# Patient Record
Sex: FEMALE | Race: WHITE | Employment: OTHER | URBAN - METROPOLITAN AREA
[De-identification: names, ages, dates, MRNs, and addresses within clinical notes are randomized per-mention and may not be internally consistent; named-entity substitution may affect disease eponyms.]

---

## 2017-01-11 ENCOUNTER — TELEPHONE (OUTPATIENT)
Dept: INTERNAL MEDICINE CLINIC | Facility: CLINIC | Age: 77
End: 2017-01-11

## 2017-01-11 RX ORDER — ATORVASTATIN CALCIUM 20 MG/1
20 TABLET, FILM COATED ORAL NIGHTLY
Qty: 90 TABLET | Refills: 3 | Status: SHIPPED | OUTPATIENT
Start: 2017-01-11 | End: 2017-07-11

## 2017-01-13 ENCOUNTER — OFFICE VISIT (OUTPATIENT)
Dept: INTERNAL MEDICINE CLINIC | Facility: CLINIC | Age: 77
End: 2017-01-13

## 2017-01-13 VITALS
DIASTOLIC BLOOD PRESSURE: 90 MMHG | TEMPERATURE: 97 F | BODY MASS INDEX: 29 KG/M2 | SYSTOLIC BLOOD PRESSURE: 160 MMHG | WEIGHT: 152 LBS | RESPIRATION RATE: 18 BRPM | HEART RATE: 61 BPM | OXYGEN SATURATION: 96 %

## 2017-01-13 DIAGNOSIS — J06.9 URTI (ACUTE UPPER RESPIRATORY INFECTION): Primary | ICD-10-CM

## 2017-01-13 DIAGNOSIS — I48.0 PAF (PAROXYSMAL ATRIAL FIBRILLATION) (HCC): ICD-10-CM

## 2017-01-13 DIAGNOSIS — I10 ESSENTIAL HYPERTENSION: ICD-10-CM

## 2017-01-13 PROCEDURE — 99214 OFFICE O/P EST MOD 30 MIN: CPT | Performed by: INTERNAL MEDICINE

## 2017-01-13 RX ORDER — AMIODARONE HYDROCHLORIDE 200 MG/1
200 TABLET ORAL DAILY
Qty: 30 TABLET | Refills: 3 | Status: ON HOLD | OUTPATIENT
Start: 2017-01-13 | End: 2017-02-10

## 2017-01-13 NOTE — PROGRESS NOTES
Patient states she is not feeling well. Does have a mild headache feels achy been going on for 2 weeks. She does have a nonproductive cough feels very fatigued. Denies sore throat earache. No fever chills.   Problem #2 hypertension patient denies shortn

## 2017-01-23 ENCOUNTER — OFFICE VISIT (OUTPATIENT)
Dept: INTERNAL MEDICINE CLINIC | Facility: CLINIC | Age: 77
End: 2017-01-23

## 2017-01-23 VITALS
BODY MASS INDEX: 29 KG/M2 | HEART RATE: 73 BPM | WEIGHT: 149 LBS | OXYGEN SATURATION: 99 % | DIASTOLIC BLOOD PRESSURE: 70 MMHG | RESPIRATION RATE: 16 BRPM | SYSTOLIC BLOOD PRESSURE: 156 MMHG

## 2017-01-23 DIAGNOSIS — I10 ELEVATED BLOOD PRESSURE READING WITH DIAGNOSIS OF HYPERTENSION: ICD-10-CM

## 2017-01-23 DIAGNOSIS — R53.83 OTHER FATIGUE: Primary | ICD-10-CM

## 2017-01-23 LAB
ALBUMIN SERPL-MCNC: 4.1 G/DL (ref 3.5–4.8)
ALP LIVER SERPL-CCNC: 87 U/L (ref 55–142)
ALT SERPL-CCNC: 20 U/L (ref 14–54)
AST SERPL-CCNC: 15 U/L (ref 15–41)
BASOPHILS # BLD AUTO: 0.06 X10(3) UL (ref 0–0.1)
BASOPHILS NFR BLD AUTO: 0.8 %
BILIRUB SERPL-MCNC: 0.7 MG/DL (ref 0.1–2)
BUN BLD-MCNC: 25 MG/DL (ref 8–20)
CALCIUM BLD-MCNC: 9 MG/DL (ref 8.3–10.3)
CHLORIDE: 104 MMOL/L (ref 101–111)
CO2: 29 MMOL/L (ref 22–32)
CREAT BLD-MCNC: 1.66 MG/DL (ref 0.55–1.02)
EOSINOPHIL # BLD AUTO: 0.33 X10(3) UL (ref 0–0.3)
EOSINOPHIL NFR BLD AUTO: 4.4 %
ERYTHROCYTE [DISTWIDTH] IN BLOOD BY AUTOMATED COUNT: 17.9 % (ref 11.5–16)
GLUCOSE BLD-MCNC: 91 MG/DL (ref 70–99)
HCT VFR BLD AUTO: 32.5 % (ref 34–50)
HGB BLD-MCNC: 10.1 G/DL (ref 12–16)
IMMATURE GRANULOCYTE COUNT: 0.04 X10(3) UL (ref 0–1)
IMMATURE GRANULOCYTE RATIO %: 0.5 %
LYMPHOCYTES # BLD AUTO: 1.58 X10(3) UL (ref 0.9–4)
LYMPHOCYTES NFR BLD AUTO: 20.9 %
M PROTEIN MFR SERPL ELPH: 7.7 G/DL (ref 6.1–8.3)
MCH RBC QN AUTO: 27.4 PG (ref 27–33.2)
MCHC RBC AUTO-ENTMCNC: 31.1 G/DL (ref 31–37)
MCV RBC AUTO: 88.1 FL (ref 81–100)
MONOCYTES # BLD AUTO: 0.59 X10(3) UL (ref 0.1–0.6)
MONOCYTES NFR BLD AUTO: 7.8 %
NEUTROPHIL ABS PRELIM: 4.97 X10 (3) UL (ref 1.3–6.7)
NEUTROPHILS # BLD AUTO: 4.97 X10(3) UL (ref 1.3–6.7)
NEUTROPHILS NFR BLD AUTO: 65.6 %
PLATELET # BLD AUTO: 270 10(3)UL (ref 150–450)
POTASSIUM SERPL-SCNC: 4.2 MMOL/L (ref 3.6–5.1)
RBC # BLD AUTO: 3.69 X10(6)UL (ref 3.8–5.1)
RED CELL DISTRIBUTION WIDTH-SD: 58 FL (ref 35.1–46.3)
SODIUM SERPL-SCNC: 137 MMOL/L (ref 136–144)
TSI SER-ACNC: 6.13 MIU/ML (ref 0.35–5.5)
WBC # BLD AUTO: 7.6 X10(3) UL (ref 4–13)

## 2017-01-23 PROCEDURE — 80053 COMPREHEN METABOLIC PANEL: CPT | Performed by: INTERNAL MEDICINE

## 2017-01-23 PROCEDURE — 36415 COLL VENOUS BLD VENIPUNCTURE: CPT | Performed by: INTERNAL MEDICINE

## 2017-01-23 PROCEDURE — 99214 OFFICE O/P EST MOD 30 MIN: CPT | Performed by: INTERNAL MEDICINE

## 2017-01-23 PROCEDURE — 84443 ASSAY THYROID STIM HORMONE: CPT | Performed by: INTERNAL MEDICINE

## 2017-01-23 PROCEDURE — 85025 COMPLETE CBC W/AUTO DIFF WBC: CPT | Performed by: INTERNAL MEDICINE

## 2017-01-23 NOTE — PROGRESS NOTES
Patient states she feels fatigued. No unexplained weight loss weight gain cold or heat intolerance. No change in sleeping habits. Blood pressure once again is elevated. When reviewing past blood pressures she has been well controlled.   She denies any c

## 2017-02-02 ENCOUNTER — APPOINTMENT (OUTPATIENT)
Dept: GENERAL RADIOLOGY | Facility: HOSPITAL | Age: 77
DRG: 234 | End: 2017-02-02
Attending: STUDENT IN AN ORGANIZED HEALTH CARE EDUCATION/TRAINING PROGRAM
Payer: MEDICARE

## 2017-02-02 ENCOUNTER — APPOINTMENT (OUTPATIENT)
Dept: CV DIAGNOSTICS | Facility: HOSPITAL | Age: 77
DRG: 234 | End: 2017-02-02
Attending: INTERNAL MEDICINE
Payer: MEDICARE

## 2017-02-02 ENCOUNTER — HOSPITAL ENCOUNTER (INPATIENT)
Facility: HOSPITAL | Age: 77
LOS: 7 days | Discharge: NURSING, REHAB OR LONG-TERM CARE | DRG: 234 | End: 2017-02-10
Attending: STUDENT IN AN ORGANIZED HEALTH CARE EDUCATION/TRAINING PROGRAM | Admitting: INTERNAL MEDICINE
Payer: MEDICARE

## 2017-02-02 DIAGNOSIS — R07.9 ACUTE CHEST PAIN: Primary | ICD-10-CM

## 2017-02-02 DIAGNOSIS — I48.0 PAF (PAROXYSMAL ATRIAL FIBRILLATION) (HCC): ICD-10-CM

## 2017-02-02 DIAGNOSIS — R06.00 DYSPNEA, UNSPECIFIED TYPE: ICD-10-CM

## 2017-02-02 DIAGNOSIS — M85.80 OSTEOPENIA: ICD-10-CM

## 2017-02-02 PROBLEM — D64.9 ANEMIA: Status: ACTIVE | Noted: 2017-02-02

## 2017-02-02 PROBLEM — R73.9 HYPERGLYCEMIA: Status: ACTIVE | Noted: 2017-02-02

## 2017-02-02 LAB
ADENOVIRUS PCR:: NEGATIVE
ALBUMIN SERPL-MCNC: 3.7 G/DL (ref 3.5–4.8)
ALP LIVER SERPL-CCNC: 92 U/L (ref 55–142)
ALT SERPL-CCNC: 19 U/L (ref 14–54)
APTT PPP: 137.7 SECONDS (ref 25–34)
APTT PPP: 40.6 SECONDS (ref 25–34)
APTT PPP: 50.5 SECONDS (ref 25–34)
AST SERPL-CCNC: 16 U/L (ref 15–41)
ATRIAL RATE: 61 BPM
B PERT DNA SPEC QL NAA+PROBE: NEGATIVE
BASOPHILS # BLD AUTO: 0.04 X10(3) UL (ref 0–0.1)
BASOPHILS # BLD AUTO: 0.05 X10(3) UL (ref 0–0.1)
BASOPHILS NFR BLD AUTO: 0.5 %
BASOPHILS NFR BLD AUTO: 0.7 %
BILIRUB SERPL-MCNC: 0.6 MG/DL (ref 0.1–2)
BUN BLD-MCNC: 23 MG/DL (ref 8–20)
BUN BLD-MCNC: 26 MG/DL (ref 8–20)
BUN BLD-MCNC: 27 MG/DL (ref 8–20)
C PNEUM DNA SPEC QL NAA+PROBE: NEGATIVE
CALCIUM BLD-MCNC: 8.8 MG/DL (ref 8.3–10.3)
CALCIUM BLD-MCNC: 8.9 MG/DL (ref 8.3–10.3)
CALCIUM BLD-MCNC: 9.4 MG/DL (ref 8.3–10.3)
CHLORIDE: 104 MMOL/L (ref 101–111)
CHLORIDE: 104 MMOL/L (ref 101–111)
CHLORIDE: 105 MMOL/L (ref 101–111)
CHOLEST SMN-MCNC: 128 MG/DL (ref ?–200)
CO2: 26 MMOL/L (ref 22–32)
CO2: 27 MMOL/L (ref 22–32)
CO2: 27 MMOL/L (ref 22–32)
CORONAVIRUS 229E PCR:: NEGATIVE
CORONAVIRUS HKU1 PCR:: NEGATIVE
CORONAVIRUS NL63 PCR:: NEGATIVE
CORONAVIRUS OC43 PCR:: NEGATIVE
CREAT BLD-MCNC: 1.69 MG/DL (ref 0.55–1.02)
CREAT BLD-MCNC: 1.83 MG/DL (ref 0.55–1.02)
CREAT BLD-MCNC: 1.85 MG/DL (ref 0.55–1.02)
EOSINOPHIL # BLD AUTO: 0.33 X10(3) UL (ref 0–0.3)
EOSINOPHIL # BLD AUTO: 0.35 X10(3) UL (ref 0–0.3)
EOSINOPHIL NFR BLD AUTO: 4.5 %
EOSINOPHIL NFR BLD AUTO: 4.7 %
ERYTHROCYTE [DISTWIDTH] IN BLOOD BY AUTOMATED COUNT: 17.8 % (ref 11.5–16)
ERYTHROCYTE [DISTWIDTH] IN BLOOD BY AUTOMATED COUNT: 17.9 % (ref 11.5–16)
ERYTHROCYTE [DISTWIDTH] IN BLOOD BY AUTOMATED COUNT: 18 % (ref 11.5–16)
FLUAV H1 2009 PAND RNA SPEC QL NAA+PROBE: NEGATIVE
FLUAV H1 RNA SPEC QL NAA+PROBE: NEGATIVE
FLUAV H3 RNA SPEC QL NAA+PROBE: NEGATIVE
FLUAV RNA SPEC QL NAA+PROBE: NEGATIVE
FLUBV RNA SPEC QL NAA+PROBE: NEGATIVE
GLUCOSE BLD-MCNC: 101 MG/DL (ref 70–99)
GLUCOSE BLD-MCNC: 102 MG/DL (ref 70–99)
GLUCOSE BLD-MCNC: 125 MG/DL (ref 70–99)
HAV IGM SER QL: 2.1 MG/DL (ref 1.7–3)
HCT VFR BLD AUTO: 30.2 % (ref 34–50)
HCT VFR BLD AUTO: 31.1 % (ref 34–50)
HCT VFR BLD AUTO: 31.2 % (ref 34–50)
HDLC SERPL-MCNC: 55 MG/DL (ref 45–?)
HDLC SERPL: 2.33 {RATIO} (ref ?–4.44)
HGB BLD-MCNC: 10.2 G/DL (ref 12–16)
HGB BLD-MCNC: 9.6 G/DL (ref 12–16)
HGB BLD-MCNC: 9.9 G/DL (ref 12–16)
IMMATURE GRANULOCYTE COUNT: 0.02 X10(3) UL (ref 0–1)
IMMATURE GRANULOCYTE COUNT: 0.02 X10(3) UL (ref 0–1)
IMMATURE GRANULOCYTE RATIO %: 0.3 %
IMMATURE GRANULOCYTE RATIO %: 0.3 %
INR BLD: 1.68 (ref 0.89–1.12)
LACTIC ACID: 0.5 MMOL/L (ref 0.5–2)
LACTIC ACID: 0.9 MMOL/L (ref 0.5–2)
LACTIC ACID: 1.2 MMOL/L (ref 0.5–2)
LDLC SERPL CALC-MCNC: 53 MG/DL (ref ?–130)
LYMPHOCYTES # BLD AUTO: 1.61 X10(3) UL (ref 0.9–4)
LYMPHOCYTES # BLD AUTO: 1.61 X10(3) UL (ref 0.9–4)
LYMPHOCYTES NFR BLD AUTO: 21.4 %
LYMPHOCYTES NFR BLD AUTO: 21.7 %
M PROTEIN MFR SERPL ELPH: 7.5 G/DL (ref 6.1–8.3)
MCH RBC QN AUTO: 27.5 PG (ref 27–33.2)
MCH RBC QN AUTO: 27.6 PG (ref 27–33.2)
MCH RBC QN AUTO: 28.1 PG (ref 27–33.2)
MCHC RBC AUTO-ENTMCNC: 31.8 G/DL (ref 31–37)
MCHC RBC AUTO-ENTMCNC: 31.8 G/DL (ref 31–37)
MCHC RBC AUTO-ENTMCNC: 32.7 G/DL (ref 31–37)
MCV RBC AUTO: 86 FL (ref 81–100)
MCV RBC AUTO: 86.5 FL (ref 81–100)
MCV RBC AUTO: 86.6 FL (ref 81–100)
METAPNEUMOVIRUS PCR:: NEGATIVE
MONOCYTES # BLD AUTO: 0.63 X10(3) UL (ref 0.1–0.6)
MONOCYTES # BLD AUTO: 0.69 X10(3) UL (ref 0.1–0.6)
MONOCYTES NFR BLD AUTO: 8.5 %
MONOCYTES NFR BLD AUTO: 9.2 %
MYCOPLASMA PNEUMONIA PCR:: NEGATIVE
NEUTROPHIL ABS PRELIM: 4.77 X10 (3) UL (ref 1.3–6.7)
NEUTROPHIL ABS PRELIM: 4.81 X10 (3) UL (ref 1.3–6.7)
NEUTROPHILS # BLD AUTO: 4.77 X10(3) UL (ref 1.3–6.7)
NEUTROPHILS # BLD AUTO: 4.81 X10(3) UL (ref 1.3–6.7)
NEUTROPHILS NFR BLD AUTO: 63.9 %
NEUTROPHILS NFR BLD AUTO: 64.3 %
NONHDLC SERPL-MCNC: 73 MG/DL (ref ?–130)
P AXIS: 60 DEGREES
P-R INTERVAL: 220 MS
PARAINFLUENZA 1 PCR:: NEGATIVE
PARAINFLUENZA 2 PCR:: NEGATIVE
PARAINFLUENZA 3 PCR:: NEGATIVE
PARAINFLUENZA 4 PCR:: NEGATIVE
PLATELET # BLD AUTO: 222 10(3)UL (ref 150–450)
PLATELET # BLD AUTO: 236 10(3)UL (ref 150–450)
PLATELET # BLD AUTO: 239 10(3)UL (ref 150–450)
POTASSIUM SERPL-SCNC: 3.9 MMOL/L (ref 3.6–5.1)
POTASSIUM SERPL-SCNC: 4.2 MMOL/L (ref 3.6–5.1)
POTASSIUM SERPL-SCNC: 4.6 MMOL/L (ref 3.6–5.1)
PRO-BETA NATRIURETIC PEPTIDE: 540 PG/ML (ref ?–450)
PROCALCITONIN SERPL-MCNC: <0.11 NG/ML (ref ?–0.11)
PSA SERPL DL<=0.01 NG/ML-MCNC: 20.4 SECONDS (ref 12.3–14.8)
Q-T INTERVAL: 418 MS
QRS DURATION: 74 MS
QTC CALCULATION (BEZET): 420 MS
R AXIS: 50 DEGREES
RBC # BLD AUTO: 3.49 X10(6)UL (ref 3.8–5.1)
RBC # BLD AUTO: 3.59 X10(6)UL (ref 3.8–5.1)
RBC # BLD AUTO: 3.63 X10(6)UL (ref 3.8–5.1)
RED CELL DISTRIBUTION WIDTH-SD: 56.4 FL (ref 35.1–46.3)
RED CELL DISTRIBUTION WIDTH-SD: 56.8 FL (ref 35.1–46.3)
RED CELL DISTRIBUTION WIDTH-SD: 57 FL (ref 35.1–46.3)
RHINOVIRUS/ENTERO PCR:: POSITIVE
RSV RNA SPEC QL NAA+PROBE: NEGATIVE
SODIUM SERPL-SCNC: 138 MMOL/L (ref 136–144)
SODIUM SERPL-SCNC: 138 MMOL/L (ref 136–144)
SODIUM SERPL-SCNC: 139 MMOL/L (ref 136–144)
T AXIS: 78 DEGREES
TRIGLYCERIDES: 100 MG/DL (ref ?–150)
TROPONIN: 0.06 NG/ML (ref ?–0.05)
TROPONIN: <0.046 NG/ML (ref ?–0.05)
TROPONIN: <0.046 NG/ML (ref ?–0.05)
VENTRICULAR RATE: 61 BPM
VLDL: 20 MG/DL (ref 5–40)
WBC # BLD AUTO: 6.4 X10(3) UL (ref 4–13)
WBC # BLD AUTO: 7.4 X10(3) UL (ref 4–13)
WBC # BLD AUTO: 7.5 X10(3) UL (ref 4–13)

## 2017-02-02 PROCEDURE — 71010 XR CHEST AP PORTABLE  (CPT=71010): CPT

## 2017-02-02 PROCEDURE — 99220 INITIAL OBSERVATION CARE,LEVL III: CPT | Performed by: INTERNAL MEDICINE

## 2017-02-02 PROCEDURE — 93306 TTE W/DOPPLER COMPLETE: CPT

## 2017-02-02 PROCEDURE — 93306 TTE W/DOPPLER COMPLETE: CPT | Performed by: INTERNAL MEDICINE

## 2017-02-02 RX ORDER — ENOXAPARIN SODIUM 100 MG/ML
40 INJECTION SUBCUTANEOUS DAILY
Status: DISCONTINUED | OUTPATIENT
Start: 2017-02-02 | End: 2017-02-02

## 2017-02-02 RX ORDER — MORPHINE SULFATE 2 MG/ML
2 INJECTION, SOLUTION INTRAMUSCULAR; INTRAVENOUS EVERY 2 HOUR PRN
Status: DISCONTINUED | OUTPATIENT
Start: 2017-02-02 | End: 2017-02-06

## 2017-02-02 RX ORDER — LEVOTHYROXINE SODIUM 0.05 MG/1
50 TABLET ORAL
Status: DISCONTINUED | OUTPATIENT
Start: 2017-02-02 | End: 2017-02-07

## 2017-02-02 RX ORDER — HEPARIN SODIUM AND DEXTROSE 10000; 5 [USP'U]/100ML; G/100ML
INJECTION INTRAVENOUS CONTINUOUS
Status: DISCONTINUED | OUTPATIENT
Start: 2017-02-02 | End: 2017-02-06

## 2017-02-02 RX ORDER — MAGNESIUM OXIDE 400 MG (241.3 MG MAGNESIUM) TABLET
100 TABLET DAILY
Status: DISCONTINUED | OUTPATIENT
Start: 2017-02-02 | End: 2017-02-06

## 2017-02-02 RX ORDER — NITROGLYCERIN 0.4 MG/1
0.4 TABLET SUBLINGUAL EVERY 5 MIN PRN
Status: DISCONTINUED | OUTPATIENT
Start: 2017-02-02 | End: 2017-02-06

## 2017-02-02 RX ORDER — MORPHINE SULFATE 4 MG/ML
4 INJECTION, SOLUTION INTRAMUSCULAR; INTRAVENOUS EVERY 2 HOUR PRN
Status: DISCONTINUED | OUTPATIENT
Start: 2017-02-02 | End: 2017-02-06

## 2017-02-02 RX ORDER — ASPIRIN 325 MG
325 TABLET ORAL DAILY
Status: DISCONTINUED | OUTPATIENT
Start: 2017-02-02 | End: 2017-02-10

## 2017-02-02 RX ORDER — MORPHINE SULFATE 2 MG/ML
1 INJECTION, SOLUTION INTRAMUSCULAR; INTRAVENOUS EVERY 2 HOUR PRN
Status: DISCONTINUED | OUTPATIENT
Start: 2017-02-02 | End: 2017-02-06

## 2017-02-02 RX ORDER — LORAZEPAM 1 MG/1
1 TABLET ORAL NIGHTLY PRN
Status: DISCONTINUED | OUTPATIENT
Start: 2017-02-02 | End: 2017-02-06

## 2017-02-02 RX ORDER — HEPARIN SODIUM 5000 [USP'U]/ML
60 INJECTION INTRAVENOUS; SUBCUTANEOUS ONCE
Status: DISCONTINUED | OUTPATIENT
Start: 2017-02-02 | End: 2017-02-05 | Stop reason: ALTCHOICE

## 2017-02-02 RX ORDER — SODIUM CHLORIDE 9 MG/ML
INJECTION, SOLUTION INTRAVENOUS CONTINUOUS
Status: DISCONTINUED | OUTPATIENT
Start: 2017-02-02 | End: 2017-02-02

## 2017-02-02 RX ORDER — MELATONIN
325
Status: DISCONTINUED | OUTPATIENT
Start: 2017-02-02 | End: 2017-02-10

## 2017-02-02 RX ORDER — ATORVASTATIN CALCIUM 20 MG/1
20 TABLET, FILM COATED ORAL NIGHTLY
Status: DISCONTINUED | OUTPATIENT
Start: 2017-02-02 | End: 2017-02-10

## 2017-02-02 RX ORDER — PANTOPRAZOLE SODIUM 20 MG/1
20 TABLET, DELAYED RELEASE ORAL
Status: DISCONTINUED | OUTPATIENT
Start: 2017-02-02 | End: 2017-02-06

## 2017-02-02 RX ORDER — LOSARTAN POTASSIUM 100 MG/1
100 TABLET ORAL DAILY
Status: DISCONTINUED | OUTPATIENT
Start: 2017-02-02 | End: 2017-02-06

## 2017-02-02 RX ORDER — METOPROLOL SUCCINATE 25 MG/1
25 TABLET, EXTENDED RELEASE ORAL
Status: DISCONTINUED | OUTPATIENT
Start: 2017-02-02 | End: 2017-02-10

## 2017-02-02 RX ORDER — AMIODARONE HYDROCHLORIDE 200 MG/1
200 TABLET ORAL
Status: DISCONTINUED | OUTPATIENT
Start: 2017-02-02 | End: 2017-02-08

## 2017-02-02 NOTE — ED PROVIDER NOTES
Patient Seen in: [facility] Emergency Department    History   Patient presents with:  Chest Pain Angina (cardiovascular)    Stated Complaint: cp-resolved    HPI    Patient is a 49-year-old lady with previous history of coronary artery disease who pr Fibroadenoma    CATH BARE METAL STENT (BMS)      CATARACT       N/A 6/6/2016    Comment Procedure: ABDOMINAL AORTIC ANEURYSM ENDOSCOPIC;  Surgeon: Emilee Gifford MD;  Location: [facility] CVOR    EGD N/A 8/19/2016    Comment Procedure: ESOPHAGOGASTRODUODENOSCOPY Brother    • lung disease [Other] [OTHER] Sister    • Suicide History Daughter 43   • Suicide History Son 43   • lung cancer [Other] [OTHER] Daughter 50         Smoking Status: Former Smoker                   Packs/Day: 1.00  Years: 30        Types: Gorge Hodgkin Reviewed   COMP METABOLIC PANEL (14) - Abnormal; Notable for the following:     Glucose 102 (*)     BUN 27 (*)     Creatinine 1.85 (*)     GFR 26 (*)     All other components within normal limits   PROTHROMBIN TIME (PT) - Abnormal; Notable for the followin elevations, no significant change from prior EKG with the exception of T-wave flattening noted in V2 and V3 as compared to previous EKG performed on August 25, 2016.             MDM     Extensive differential diagnosis was considered including underlying ca

## 2017-02-02 NOTE — PLAN OF CARE
Discussed case with Dr. Rhoda Moreno. Plan is for cath if negative for influenza. Swab positive for Rhino virus and Enterovirus. Continue IV heparin for now. Will determine timing for LHC with Dr. Rhoda Moreno.    CYNDI Davis  2/2/2017  12:33 PM

## 2017-02-02 NOTE — PLAN OF CARE
Patient/Family Goals    • Patient/Family Long Term Goal Progressing    • Patient/Family Short Term Goal Progressing        Patient was admitted from the ER with CP and reports to have had a cold the last few days.  Troponin neg, chest xray neg, lactic acid

## 2017-02-02 NOTE — PLAN OF CARE
CARDIOVASCULAR - ADULT    • Maintains optimal cardiac output and hemodynamic stability Progressing    • Absence of cardiac arrhythmias or at baseline Progressing          Assumed care of pt around 0730. Denies any chest pain or SOB on RA.   Sinus brandon HR 5

## 2017-02-02 NOTE — ED INITIAL ASSESSMENT (HPI)
Pt reports chest pain earlier tonight, now resolved, shortness of breath tonight, now resolved. States she is doctoring a recent cold for past few days.  Denies pain on arrival.

## 2017-02-02 NOTE — CONSULTS
[facility]    Cardiology Consultation    Evangelista Sunshine Location: [address]    1940 MRN [id number]   Consulting Date 2017 [facility] [id number]   Consulting Physician Gorge Green MD Attending Physician Dalia Alamo MD ESOPHAGOGASTRODUODENOSCOPY (EGD);   Surgeon: Srini Carson MD;  Location: [facility] ENDOSCOPY     Family History   Problem Relation Age of Onset   • cancer larynx [Other] [OTHER] Father    • Heart Disease Mother    • Skin cancer Brother    • lung disease [ 37.5-25 MG Oral Cap Take 1 capsule by mouth every morning. Disp: 90 capsule Rfl: 3   Multiple Vitamins-Minerals (TAB-A-MADELINE MAXIMUM) Oral Tab Take 1 tablet by mouth daily. Disp:  Rfl:    aspirin 81 MG Oral Tab Take 1 tablet (81 mg total) by mouth daily.  Arnav Hernandez 02/02/2017    02/02/2017   CA 8.9 02/02/2017   ALB 3.7 02/02/2017   ALKPHO 92 02/02/2017   BILT 0.6 02/02/2017   TP 7.5 02/02/2017   AST 16 02/02/2017   ALT 19 02/02/2017   PTT 50.5 02/02/2017   INR 1.68 02/02/2017   MG 2.1 02/02/2017   TROP 0.063 0

## 2017-02-02 NOTE — ED NOTES
Report phoned to [facility] RN Pt to transfer to [address] [facility].

## 2017-02-02 NOTE — H&P
[facility]                                                               History & Physical         Gopal Charles Patient Status:  Observation    1940 MRN [id number]   [facility] 8NE-A Attending Poonam Pak MD   [facility] Day ESOPHAGOGASTRODUODENOSCOPY (EGD); Surgeon: Layne Heimlich, MD;  Location: [address] [place] [address]    EGD N/A 8/26/2016    Comment Procedure: ESOPHAGOGASTRODUODENOSCOPY (EGD);   Surgeon: Jhony Poole MD;  Location: [address] [place] [address]       Family history:  Family Rfl: 3 Taking   LORazepam 1 MG Oral Tab Take 1 tablet (1 mg total) by mouth nightly as needed for Anxiety. Disp: 90 tablet Rfl: 3 Taking   Triamterene-HCTZ 37.5-25 MG Oral Cap Take 1 capsule by mouth every morning.  Disp: 90 capsule Rfl: 3 Taking   Multiple 3.7 02/02/2017   ALKPHO 92 02/02/2017   BILT 0.6 02/02/2017   TP 7.5 02/02/2017   AST 16 02/02/2017   ALT 19 02/02/2017   PTT 50.5 02/02/2017   INR 1.68 02/02/2017   PTP 20.4 02/02/2017   TROP <0.046 02/02/2017       Recent Labs   Lab  02/02/17   0135   PT

## 2017-02-03 ENCOUNTER — APPOINTMENT (OUTPATIENT)
Dept: INTERVENTIONAL RADIOLOGY/VASCULAR | Facility: HOSPITAL | Age: 77
DRG: 234 | End: 2017-02-03
Attending: NURSE PRACTITIONER
Payer: MEDICARE

## 2017-02-03 LAB
APTT PPP: 106.3 SECONDS (ref 25–34)
APTT PPP: 62.8 SECONDS (ref 25–34)
ATRIAL RATE: 70 BPM
P AXIS: 63 DEGREES
P-R INTERVAL: 218 MS
PLATELET # BLD AUTO: 228 10(3)UL (ref 150–450)
Q-T INTERVAL: 392 MS
QRS DURATION: 84 MS
QTC CALCULATION (BEZET): 423 MS
R AXIS: 68 DEGREES
T AXIS: 117 DEGREES
VENTRICULAR RATE: 70 BPM

## 2017-02-03 PROCEDURE — 99232 SBSQ HOSP IP/OBS MODERATE 35: CPT | Performed by: HOSPITALIST

## 2017-02-03 PROCEDURE — B211YZZ FLUOROSCOPY OF MULTIPLE CORONARY ARTERIES USING OTHER CONTRAST: ICD-10-PCS | Performed by: INTERNAL MEDICINE

## 2017-02-03 PROCEDURE — B215YZZ FLUOROSCOPY OF LEFT HEART USING OTHER CONTRAST: ICD-10-PCS | Performed by: INTERNAL MEDICINE

## 2017-02-03 PROCEDURE — 4A023N7 MEASUREMENT OF CARDIAC SAMPLING AND PRESSURE, LEFT HEART, PERCUTANEOUS APPROACH: ICD-10-PCS | Performed by: INTERNAL MEDICINE

## 2017-02-03 RX ORDER — HEPARIN SODIUM 5000 [USP'U]/ML
INJECTION, SOLUTION INTRAVENOUS; SUBCUTANEOUS
Status: COMPLETED
Start: 2017-02-03 | End: 2017-02-03

## 2017-02-03 RX ORDER — SODIUM CHLORIDE 9 MG/ML
INJECTION, SOLUTION INTRAVENOUS CONTINUOUS
Status: DISCONTINUED | OUTPATIENT
Start: 2017-02-03 | End: 2017-02-04

## 2017-02-03 RX ORDER — NITROGLYCERIN 20 MG/100ML
INJECTION INTRAVENOUS CONTINUOUS
Status: DISCONTINUED | OUTPATIENT
Start: 2017-02-03 | End: 2017-02-04

## 2017-02-03 RX ORDER — MIDAZOLAM HYDROCHLORIDE 1 MG/ML
INJECTION INTRAMUSCULAR; INTRAVENOUS
Status: COMPLETED
Start: 2017-02-03 | End: 2017-02-03

## 2017-02-03 RX ORDER — ASPIRIN 81 MG/1
324 TABLET, CHEWABLE ORAL ONCE
Status: COMPLETED | OUTPATIENT
Start: 2017-02-03 | End: 2017-02-03

## 2017-02-03 RX ORDER — LIDOCAINE HYDROCHLORIDE 10 MG/ML
INJECTION, SOLUTION INFILTRATION; PERINEURAL
Status: COMPLETED
Start: 2017-02-03 | End: 2017-02-03

## 2017-02-03 NOTE — PROGRESS NOTES
[facility]  Progress Note     Providence Hatchet Patient Status:  Inpatient    1940 MRN [id number]   [facility] 8NE-A Attending Mario Hull MD   Hosp Day # 1 PCP Kris Aaron MD     Chief Complaint: cp and sob    S: Patie Daily   • Losartan Potassium  100 mg Oral Daily   • Metoprolol Succinate ER  25 mg Oral Daily Beta Blocker   • ferrous sulfate  325 mg Oral TID CC   • Pantoprazole Sodium  20 mg Oral QAM AC   • Levothyroxine Sodium  50 mcg Oral Before breakfast   • Atorvas

## 2017-02-03 NOTE — PLAN OF CARE
Patient developed 4/10 mid sternal chest pain radiating to back and diaphoresis while at rest this evening. 12 EKG completed. Patient given SL nitro and morphine IV with resolution of pain.   Dr. Leigh Pitch notified of the above with orders to start nitro drip

## 2017-02-04 LAB
APTT PPP: 46.2 SECONDS (ref 25–34)
APTT PPP: 47.8 SECONDS (ref 25–34)
APTT PPP: 52.5 SECONDS (ref 25–34)
PLATELET # BLD AUTO: 232 10(3)UL (ref 150–450)

## 2017-02-04 PROCEDURE — 99232 SBSQ HOSP IP/OBS MODERATE 35: CPT | Performed by: HOSPITALIST

## 2017-02-04 NOTE — PROGRESS NOTES
[facility] Cardiology Progress Note  Subjective:  No chest pain or dyspnea. Seen by Dr. Pat Truong- CABG Monday    Objective:  115/50  Afebrile   SR   I/O equal    plt 232K    Neuro:awake/alert; nml affect. HEENT:noJVD, moist mucosa; anicteric sclera; no tm.

## 2017-02-04 NOTE — PROGRESS NOTES
[facility]  Progress Note     An Harada Patient Status:  Inpatient    1940 MRN [id number]   [facility] 8NE-A Attending Tawanna Brian MD   Hosp Day # 2 PCP Serena Brown MD     Chief Complaint: cp    S: Patient is wi Epic.    Medications:   • magnesium oxide  100 mg Oral Daily   • Losartan Potassium  100 mg Oral Daily   • Metoprolol Succinate ER  25 mg Oral Daily Beta Blocker   • ferrous sulfate  325 mg Oral TID CC   • Pantoprazole Sodium  20 mg Oral QAM AC   • Levothy

## 2017-02-04 NOTE — PLAN OF CARE
S/p cardiac cath today showing multivessel dx. Right groin manual pull. Site soft, clean, dry, and intact without signs of hematoma. Denies any chest or groin pain tonight. Spoke with Dr. Michael Rainey regarding restarting heparin and nitro post cath.

## 2017-02-05 ENCOUNTER — ANESTHESIA EVENT (OUTPATIENT)
Dept: CARDIAC SURGERY | Facility: HOSPITAL | Age: 77
DRG: 234 | End: 2017-02-05
Payer: MEDICARE

## 2017-02-05 LAB
ANTIBODY SCREEN: NEGATIVE
APTT PPP: 71.6 SECONDS (ref 25–34)
APTT PPP: 81.9 SECONDS (ref 25–34)
ATRIAL RATE: 62 BPM
ATRIAL RATE: 68 BPM
BUN BLD-MCNC: 23 MG/DL (ref 8–20)
CALCIUM BLD-MCNC: 9.1 MG/DL (ref 8.3–10.3)
CHLORIDE: 107 MMOL/L (ref 101–111)
CO2: 24 MMOL/L (ref 22–32)
CREAT BLD-MCNC: 1.73 MG/DL (ref 0.55–1.02)
ERYTHROCYTE [DISTWIDTH] IN BLOOD BY AUTOMATED COUNT: 18.1 % (ref 11.5–16)
EST. AVERAGE GLUCOSE BLD GHB EST-MCNC: 117 MG/DL (ref 68–126)
GLUCOSE BLD-MCNC: 105 MG/DL (ref 70–99)
HBA1C MFR BLD HPLC: 5.7 % (ref ?–5.7)
HCT VFR BLD AUTO: 29.7 % (ref 34–50)
HGB BLD-MCNC: 9.5 G/DL (ref 12–16)
MCH RBC QN AUTO: 28 PG (ref 27–33.2)
MCHC RBC AUTO-ENTMCNC: 32 G/DL (ref 31–37)
MCV RBC AUTO: 87.6 FL (ref 81–100)
P AXIS: 69 DEGREES
P AXIS: 90 DEGREES
P-R INTERVAL: 220 MS
P-R INTERVAL: 226 MS
PLATELET # BLD AUTO: 216 10(3)UL (ref 150–450)
PLATELET # BLD AUTO: 217 10(3)UL (ref 150–450)
POTASSIUM SERPL-SCNC: 4.1 MMOL/L (ref 3.6–5.1)
Q-T INTERVAL: 444 MS
Q-T INTERVAL: 454 MS
QRS DURATION: 86 MS
QRS DURATION: 92 MS
QTC CALCULATION (BEZET): 460 MS
QTC CALCULATION (BEZET): 472 MS
R AXIS: 60 DEGREES
R AXIS: 74 DEGREES
RBC # BLD AUTO: 3.39 X10(6)UL (ref 3.8–5.1)
RED CELL DISTRIBUTION WIDTH-SD: 58.1 FL (ref 35.1–46.3)
RH BLOOD TYPE: NEGATIVE
SODIUM SERPL-SCNC: 140 MMOL/L (ref 136–144)
T AXIS: 101 DEGREES
T AXIS: 94 DEGREES
VENTRICULAR RATE: 62 BPM
VENTRICULAR RATE: 68 BPM
WBC # BLD AUTO: 7.1 X10(3) UL (ref 4–13)

## 2017-02-05 PROCEDURE — 99231 SBSQ HOSP IP/OBS SF/LOW 25: CPT | Performed by: HOSPITALIST

## 2017-02-05 RX ORDER — NITROGLYCERIN 20 MG/100ML
INJECTION INTRAVENOUS CONTINUOUS
Status: DISCONTINUED | OUTPATIENT
Start: 2017-02-05 | End: 2017-02-05

## 2017-02-05 RX ORDER — SODIUM CHLORIDE 9 MG/ML
INJECTION, SOLUTION INTRAVENOUS CONTINUOUS
Status: DISCONTINUED | OUTPATIENT
Start: 2017-02-05 | End: 2017-02-06

## 2017-02-05 RX ORDER — ALPRAZOLAM 0.5 MG/1
0.5 TABLET ORAL NIGHTLY PRN
Status: DISCONTINUED | OUTPATIENT
Start: 2017-02-05 | End: 2017-02-06

## 2017-02-05 RX ORDER — ACETAMINOPHEN 325 MG/1
650 TABLET ORAL EVERY 6 HOURS PRN
Status: DISCONTINUED | OUTPATIENT
Start: 2017-02-05 | End: 2017-02-06

## 2017-02-05 RX ORDER — ACETAMINOPHEN 325 MG/1
TABLET ORAL
Status: COMPLETED
Start: 2017-02-05 | End: 2017-02-05

## 2017-02-05 RX ORDER — NITROGLYCERIN 20 MG/100ML
INJECTION INTRAVENOUS CONTINUOUS
Status: DISCONTINUED | OUTPATIENT
Start: 2017-02-05 | End: 2017-02-06

## 2017-02-05 NOTE — PROGRESS NOTES
[facility]  Progress Note    Zaynab Fink     Subjective:  No chest pain or shortness of breath today, but had 15 mins of angina at 1130 last night- resolved with 1 sl ntg.        Intake/Output:    Intake/Output Summary (Last 24 hours) at 02/05/17 0.9%  NaCl infusion  Intravenous Continuous   mupirocin (BACTROBAN) 2% nasal ointment OINT 1 Application 1 Application Nasal Now then every 0500   magnesium oxide (MAG-OX) tab 100 mg 100 mg Oral Daily   losartan (COZAAR) tab 100 mg 100 mg Oral Daily   Me

## 2017-02-05 NOTE — PROGRESS NOTES
[facility]  Progress Note     Rinku Chi Patient Status:  Inpatient    1940 MRN [id number]   [facility] 8NE-A Attending Susannah Rivera MD   Hosp Day # 3 PCP Maryanne Gutierrez MD     Chief Complaint: cp    S: Patient had h Nightly   • amiodarone HCl  200 mg Oral Daily with food   • aspirin  325 mg Oral Daily   • Heparin Sodium (Porcine)  60 Units/kg Intravenous Once       ASSESSMENT / PLAN:     1. 69 yo p/w cp and sob, viral URTI and known hx of cad- NSTEMI- on Hep iv and no

## 2017-02-05 NOTE — PLAN OF CARE
Tele SR. VSS. Denies chest pain. Remains on heparin drip at 800 units/hr. PTT pending. Seen by Dr. Osito Silva. Plan is for surgery on Monday at noon per pt. Watched Laila Ortiz CABG video. See EMR for further information.

## 2017-02-05 NOTE — ANESTHESIA PREPROCEDURE EVALUATION
PRE-OP EVALUATION    Patient Name: Franco Larios    Pre-op Diagnosis: add on for cabg to follow on monday, notified anesthesia at [phone]    Procedure(s):   cabg to follow first case.  patient in 36    Surgeon(s) and Role:     * Luca Howe MD - Primary breakfast   LORazepam (ATIVAN) tab 1 mg 1 mg Oral Nightly PRN   Atorvastatin Calcium (LIPITOR) tab 20 mg 20 mg Oral Nightly   amiodarone HCl (PACERONE) tab 200 mg 200 mg Oral Daily with food   aspirin tab 325 mg 325 mg Oral Daily   nitroGLYCERIN (NITROSTAT regurgitation. 4. Left atrium: The left atrium was mildly dilated. 5. Right atrium: The atrium was mildly dilated.   Impressions:  This study is compared with previous dated 05-05-15: Compared  to the prior study, there has been no significant interval ch Results  Component Value Date    02/05/2017   K 4.1 02/05/2017    02/05/2017   CO2 24.0 02/05/2017   BUN 23* 02/05/2017   CREATSERUM 1.73* 02/05/2017   * 02/05/2017   CA 9.1 02/05/2017       Lab Results  Component Value Date   INR 1.68*

## 2017-02-05 NOTE — PLAN OF CARE
Assumed care of patient at 1020 from CTU 8. Patient alert, oriented, and neurologically intact. VSS and patient not complaining of any pain. IV in place and stable with IVF, heparin, and nitro infusing per MD order.  Patient up and ambulating with standby a

## 2017-02-05 NOTE — PROGRESS NOTES
Reported given to Stephanie Whyte, patient transferred to room 1190 per order of Dr. Sivan Wade. Patient transferred in stable condition with belongings and medications packed.

## 2017-02-05 NOTE — PROGRESS NOTES
Pt seen and examined yesterday. Plan is for CABG Monday noon. Had CP again last night per notes. Perhaps transfer to unit to [facility].

## 2017-02-06 ENCOUNTER — ANESTHESIA (OUTPATIENT)
Dept: CARDIAC SURGERY | Facility: HOSPITAL | Age: 77
DRG: 234 | End: 2017-02-06
Payer: MEDICARE

## 2017-02-06 ENCOUNTER — APPOINTMENT (OUTPATIENT)
Dept: GENERAL RADIOLOGY | Facility: HOSPITAL | Age: 77
DRG: 234 | End: 2017-02-06
Attending: PHYSICIAN ASSISTANT
Payer: MEDICARE

## 2017-02-06 ENCOUNTER — SURGERY (OUTPATIENT)
Age: 77
End: 2017-02-06

## 2017-02-06 LAB
APTT PPP: 34.3 SECONDS (ref 25–34)
APTT PPP: 72 SECONDS (ref 25–34)
ARTERIAL BLD GAS O2 SATURATION: 95 % (ref 92–100)
ARTERIAL BLD GAS O2 SATURATION: 97 % (ref 92–100)
ARTERIAL BLOOD GAS BASE EXCESS: -3.7
ARTERIAL BLOOD GAS BASE EXCESS: -5.6
ARTERIAL BLOOD GAS HCO3: 19.1 MEQ/L (ref 22–26)
ARTERIAL BLOOD GAS HCO3: 20.4 MEQ/L (ref 22–26)
ARTERIAL BLOOD GAS PCO2: 33 MM HG (ref 35–45)
ARTERIAL BLOOD GAS PCO2: 35 MM HG (ref 35–45)
ARTERIAL BLOOD GAS PH: 7.36 (ref 7.35–7.45)
ARTERIAL BLOOD GAS PH: 7.41 (ref 7.35–7.45)
ARTERIAL BLOOD GAS PO2: 118 MM HG (ref 80–105)
ARTERIAL BLOOD GAS PO2: 97 MM HG (ref 80–105)
ATRIAL RATE: 88 BPM
BUN BLD-MCNC: 14 MG/DL (ref 8–20)
CALCIUM BLD-MCNC: 8.5 MG/DL (ref 8.3–10.3)
CALCULATED O2 SATURATION: 97 % (ref 92–100)
CALCULATED O2 SATURATION: 99 % (ref 92–100)
CARBOXYHEMOGLOBIN: 1.5 % SAT (ref 0–3)
CARBOXYHEMOGLOBIN: 1.7 % SAT (ref 0–3)
CHLORIDE: 111 MMOL/L (ref 101–111)
CO2: 21 MMOL/L (ref 22–32)
CREAT BLD-MCNC: 1.55 MG/DL (ref 0.55–1.02)
ERYTHROCYTE [DISTWIDTH] IN BLOOD BY AUTOMATED COUNT: 17.4 % (ref 11.5–16)
FIBRINOGEN: 317 MG/DL (ref 200–446)
FIO2: 40 %
FIO2: 50 %
GLUCOSE BLD-MCNC: 107 MG/DL (ref 65–99)
GLUCOSE BLD-MCNC: 134 MG/DL (ref 65–99)
GLUCOSE BLD-MCNC: 135 MG/DL (ref 65–99)
GLUCOSE BLD-MCNC: 136 MG/DL (ref 70–99)
GLUCOSE BLD-MCNC: 140 MG/DL (ref 65–99)
GLUCOSE BLD-MCNC: 140 MG/DL (ref 65–99)
GLUCOSE BLD-MCNC: 142 MG/DL (ref 65–99)
GLUCOSE BLD-MCNC: 144 MG/DL (ref 65–99)
GLUCOSE BLD-MCNC: 151 MG/DL (ref 65–99)
GLUCOSE BLD-MCNC: 154 MG/DL (ref 65–99)
GLUCOSE BLD-MCNC: 156 MG/DL (ref 65–99)
GLUCOSE BLD-MCNC: 161 MG/DL (ref 65–99)
GLUCOSE BLD-MCNC: 166 MG/DL (ref 65–99)
HAV IGM SER QL: 2.3 MG/DL (ref 1.7–3)
HCT VFR BLD AUTO: 32 % (ref 34–50)
HGB BLD-MCNC: 11 G/DL (ref 12–16)
INR BLD: 1.34 (ref 0.89–1.12)
ISTAT ACTIVATED CLOTTING TIME: 137 SECONDS (ref 74–137)
ISTAT ACTIVATED CLOTTING TIME: 147 SECONDS (ref 74–137)
ISTAT BLOOD GAS BASE DEFICIT: -1 MMOL/L
ISTAT BLOOD GAS BASE DEFICIT: -3 MMOL/L
ISTAT BLOOD GAS BASE DEFICIT: -4 MMOL/L
ISTAT BLOOD GAS BASE DEFICIT: -5 MMOL/L
ISTAT BLOOD GAS BASE EXCESS: 2 MMOL/L
ISTAT BLOOD GAS HCO3: 19.3 MEQ/L (ref 22–26)
ISTAT BLOOD GAS HCO3: 21.5 MEQ/L (ref 22–26)
ISTAT BLOOD GAS HCO3: 23.1 MEQ/L (ref 22–26)
ISTAT BLOOD GAS HCO3: 24.2 MEQ/L (ref 22–26)
ISTAT BLOOD GAS HCO3: 24.3 MEQ/L (ref 22–26)
ISTAT BLOOD GAS O2 SATURATION: 100 % (ref 92–100)
ISTAT BLOOD GAS O2 SATURATION: 70 % (ref 92–100)
ISTAT BLOOD GAS O2 SATURATION: 77 % (ref 92–100)
ISTAT BLOOD GAS O2 SATURATION: 84 % (ref 92–100)
ISTAT BLOOD GAS O2 SATURATION: 95 % (ref 92–100)
ISTAT BLOOD GAS PCO2: 28 MMHG (ref 35–45)
ISTAT BLOOD GAS PCO2: 29 MMHG (ref 35–45)
ISTAT BLOOD GAS PCO2: 33 MMHG (ref 35–45)
ISTAT BLOOD GAS PCO2: 37 MMHG (ref 35–45)
ISTAT BLOOD GAS PCO2: 40 MMHG (ref 35–45)
ISTAT BLOOD GAS PH: 7.38 (ref 7.35–7.45)
ISTAT BLOOD GAS PH: 7.39 (ref 7.35–7.45)
ISTAT BLOOD GAS PH: 7.4 (ref 7.35–7.45)
ISTAT BLOOD GAS PH: 7.44 (ref 7.35–7.45)
ISTAT BLOOD GAS PH: 7.53 (ref 7.35–7.45)
ISTAT BLOOD GAS PO2: 31 MMHG (ref 80–105)
ISTAT BLOOD GAS PO2: 37 MMHG (ref 80–105)
ISTAT BLOOD GAS PO2: 38 MMHG (ref 80–105)
ISTAT BLOOD GAS PO2: 406 MMHG (ref 80–105)
ISTAT BLOOD GAS PO2: 66 MMHG (ref 80–105)
ISTAT BLOOD GAS TCO2: 20 MMOL/L (ref 22–32)
ISTAT BLOOD GAS TCO2: 23 MMOL/L (ref 22–32)
ISTAT BLOOD GAS TCO2: 24 MMOL/L (ref 22–32)
ISTAT BLOOD GAS TCO2: 25 MMOL/L (ref 22–32)
ISTAT BLOOD GAS TCO2: 25 MMOL/L (ref 22–32)
ISTAT HEMATOCRIT: 20 % (ref 37–54)
ISTAT HEMATOCRIT: 21 % (ref 37–54)
ISTAT HEMATOCRIT: 22 % (ref 37–54)
ISTAT HEMATOCRIT: 24 % (ref 37–54)
ISTAT HEMATOCRIT: 29 % (ref 37–54)
ISTAT IONIZED CALCIUM: 1.01 MMOL/L (ref 1.12–1.32)
ISTAT IONIZED CALCIUM: 1.04 MMOL/L (ref 1.12–1.32)
ISTAT IONIZED CALCIUM: 1.07 MMOL/L (ref 1.12–1.32)
ISTAT IONIZED CALCIUM: 1.12 MMOL/L (ref 1.12–1.32)
ISTAT IONIZED CALCIUM: 1.24 MMOL/L (ref 1.12–1.32)
ISTAT PATIENT TEMPERATURE: 32 DEGREE
ISTAT PATIENT TEMPERATURE: 32 DEGREE
ISTAT PATIENT TEMPERATURE: 96.9 DEGREE
ISTAT POTASSIUM: 3.6 MMOL/L (ref 3.6–5.1)
ISTAT POTASSIUM: 3.6 MMOL/L (ref 3.6–5.1)
ISTAT POTASSIUM: 5.1 MMOL/L (ref 3.6–5.1)
ISTAT POTASSIUM: 5.5 MMOL/L (ref 3.6–5.1)
ISTAT POTASSIUM: 5.8 MMOL/L (ref 3.6–5.1)
ISTAT SODIUM: 133 MMOL/L (ref 136–144)
ISTAT SODIUM: 135 MMOL/L (ref 136–144)
ISTAT SODIUM: 136 MMOL/L (ref 136–144)
ISTAT SODIUM: 139 MMOL/L (ref 136–144)
ISTAT SODIUM: 141 MMOL/L (ref 136–144)
MCH RBC QN AUTO: 30.2 PG (ref 27–33.2)
MCHC RBC AUTO-ENTMCNC: 34.4 G/DL (ref 31–37)
MCV RBC AUTO: 87.9 FL (ref 81–100)
METHEMOGLOBIN: 0.5 % SAT (ref 0.4–1.5)
METHEMOGLOBIN: 0.5 % SAT (ref 0.4–1.5)
P AXIS: 81 DEGREES
P-R INTERVAL: 248 MS
PATIENT TEMPERATURE: 97.8 F
PATIENT TEMPERATURE: 99.3 F
PEEP: 5 CM H2O
PEEP: 7 CM H2O
PLATELET # BLD AUTO: 141 10(3)UL (ref 150–450)
POTASSIUM SERPL-SCNC: 3.3 MMOL/L (ref 3.6–5.1)
PRESSURE SUPPORT: 5 CM H2O
PSA SERPL DL<=0.01 NG/ML-MCNC: 17 SECONDS (ref 12.3–14.8)
Q-T INTERVAL: 420 MS
QRS DURATION: 88 MS
QTC CALCULATION (BEZET): 508 MS
R AXIS: 82 DEGREES
RBC # BLD AUTO: 3.64 X10(6)UL (ref 3.8–5.1)
RED CELL DISTRIBUTION WIDTH-SD: 56.4 FL (ref 35.1–46.3)
SODIUM SERPL-SCNC: 142 MMOL/L (ref 136–144)
T AXIS: 61 DEGREES
TIDAL VOLUME: 500 ML
TOTAL HEMOGLOBIN: 10.4 G/DL (ref 11.7–16)
TOTAL HEMOGLOBIN: 11 G/DL (ref 11.7–16)
VENT RATE: 12 /MIN
VENTRICULAR RATE: 88 BPM
WBC # BLD AUTO: 13 X10(3) UL (ref 4–13)

## 2017-02-06 PROCEDURE — 021109W BYPASS CORONARY ARTERY, TWO ARTERIES FROM AORTA WITH AUTOLOGOUS VENOUS TISSUE, OPEN APPROACH: ICD-10-PCS | Performed by: THORACIC SURGERY (CARDIOTHORACIC VASCULAR SURGERY)

## 2017-02-06 PROCEDURE — 99232 SBSQ HOSP IP/OBS MODERATE 35: CPT | Performed by: HOSPITALIST

## 2017-02-06 PROCEDURE — 06BQ3ZZ EXCISION OF LEFT SAPHENOUS VEIN, PERCUTANEOUS APPROACH: ICD-10-PCS | Performed by: THORACIC SURGERY (CARDIOTHORACIC VASCULAR SURGERY)

## 2017-02-06 PROCEDURE — 5A1221Z PERFORMANCE OF CARDIAC OUTPUT, CONTINUOUS: ICD-10-PCS | Performed by: THORACIC SURGERY (CARDIOTHORACIC VASCULAR SURGERY)

## 2017-02-06 PROCEDURE — 02100Z9 BYPASS CORONARY ARTERY, ONE ARTERY FROM LEFT INTERNAL MAMMARY, OPEN APPROACH: ICD-10-PCS | Performed by: THORACIC SURGERY (CARDIOTHORACIC VASCULAR SURGERY)

## 2017-02-06 PROCEDURE — 71010 XR CHEST AP PORTABLE  (CPT=71010): CPT

## 2017-02-06 RX ORDER — DEXTROSE MONOHYDRATE 25 G/50ML
50 INJECTION, SOLUTION INTRAVENOUS
Status: DISCONTINUED | OUTPATIENT
Start: 2017-02-06 | End: 2017-02-10

## 2017-02-06 RX ORDER — NITROGLYCERIN 20 MG/100ML
INJECTION INTRAVENOUS CONTINUOUS PRN
Status: DISCONTINUED | OUTPATIENT
Start: 2017-02-06 | End: 2017-02-10

## 2017-02-06 RX ORDER — MORPHINE SULFATE 4 MG/ML
8 INJECTION, SOLUTION INTRAMUSCULAR; INTRAVENOUS
Status: DISCONTINUED | OUTPATIENT
Start: 2017-02-06 | End: 2017-02-10

## 2017-02-06 RX ORDER — POLYETHYLENE GLYCOL 3350 17 G/17G
1 POWDER, FOR SOLUTION ORAL DAILY PRN
Status: DISCONTINUED | OUTPATIENT
Start: 2017-02-06 | End: 2017-02-10

## 2017-02-06 RX ORDER — HYDROCODONE BITARTRATE AND ACETAMINOPHEN 10; 325 MG/1; MG/1
2 TABLET ORAL EVERY 4 HOURS PRN
Status: DISCONTINUED | OUTPATIENT
Start: 2017-02-06 | End: 2017-02-10

## 2017-02-06 RX ORDER — DEXMEDETOMIDINE HYDROCHLORIDE 4 UG/ML
INJECTION, SOLUTION INTRAVENOUS CONTINUOUS
Status: DISCONTINUED | OUTPATIENT
Start: 2017-02-06 | End: 2017-02-08 | Stop reason: ALTCHOICE

## 2017-02-06 RX ORDER — ALBUMIN, HUMAN INJ 5% 5 %
250 SOLUTION INTRAVENOUS ONCE AS NEEDED
Status: COMPLETED | OUTPATIENT
Start: 2017-02-06 | End: 2017-02-06

## 2017-02-06 RX ORDER — MORPHINE SULFATE 2 MG/ML
2 INJECTION, SOLUTION INTRAMUSCULAR; INTRAVENOUS
Status: DISCONTINUED | OUTPATIENT
Start: 2017-02-06 | End: 2017-02-10

## 2017-02-06 RX ORDER — BISACODYL 10 MG
10 SUPPOSITORY, RECTAL RECTAL
Status: DISCONTINUED | OUTPATIENT
Start: 2017-02-06 | End: 2017-02-10

## 2017-02-06 RX ORDER — SODIUM CHLORIDE 9 MG/ML
INJECTION, SOLUTION INTRAVENOUS CONTINUOUS
Status: DISCONTINUED | OUTPATIENT
Start: 2017-02-06 | End: 2017-02-10

## 2017-02-06 RX ORDER — POTASSIUM CHLORIDE 29.8 MG/ML
40 INJECTION INTRAVENOUS AS NEEDED
Status: DISCONTINUED | OUTPATIENT
Start: 2017-02-06 | End: 2017-02-10

## 2017-02-06 RX ORDER — ASPIRIN 300 MG
300 SUPPOSITORY, RECTAL RECTAL ONCE
Status: COMPLETED | OUTPATIENT
Start: 2017-02-06 | End: 2017-02-07

## 2017-02-06 RX ORDER — DOCUSATE SODIUM 100 MG/1
100 CAPSULE, LIQUID FILLED ORAL 2 TIMES DAILY
Status: DISCONTINUED | OUTPATIENT
Start: 2017-02-06 | End: 2017-02-10

## 2017-02-06 RX ORDER — DEXTROSE AND SODIUM CHLORIDE 5; .45 G/100ML; G/100ML
INJECTION, SOLUTION INTRAVENOUS CONTINUOUS
Status: ACTIVE | OUTPATIENT
Start: 2017-02-07 | End: 2017-02-07

## 2017-02-06 RX ORDER — PANTOPRAZOLE SODIUM 40 MG/1
40 TABLET, DELAYED RELEASE ORAL
Status: DISCONTINUED | OUTPATIENT
Start: 2017-02-07 | End: 2017-02-10

## 2017-02-06 RX ORDER — HYDROCODONE BITARTRATE AND ACETAMINOPHEN 10; 325 MG/1; MG/1
1 TABLET ORAL EVERY 4 HOURS PRN
Status: DISCONTINUED | OUTPATIENT
Start: 2017-02-06 | End: 2017-02-10

## 2017-02-06 RX ORDER — MORPHINE SULFATE 4 MG/ML
4 INJECTION, SOLUTION INTRAMUSCULAR; INTRAVENOUS
Status: DISCONTINUED | OUTPATIENT
Start: 2017-02-06 | End: 2017-02-10

## 2017-02-06 RX ORDER — ASPIRIN 325 MG
325 TABLET ORAL ONCE
Status: COMPLETED | OUTPATIENT
Start: 2017-02-06 | End: 2017-02-07

## 2017-02-06 RX ORDER — POTASSIUM CHLORIDE 14.9 MG/ML
20 INJECTION INTRAVENOUS AS NEEDED
Status: DISCONTINUED | OUTPATIENT
Start: 2017-02-06 | End: 2017-02-10

## 2017-02-06 RX ORDER — DOBUTAMINE HYDROCHLORIDE 100 MG/100ML
INJECTION INTRAVENOUS CONTINUOUS PRN
Status: DISCONTINUED | OUTPATIENT
Start: 2017-02-06 | End: 2017-02-10

## 2017-02-06 RX ORDER — CEFAZOLIN SODIUM 1 G/3ML
INJECTION, POWDER, FOR SOLUTION INTRAMUSCULAR; INTRAVENOUS
Status: DISCONTINUED | OUTPATIENT
Start: 2017-02-06 | End: 2017-02-06 | Stop reason: HOSPADM

## 2017-02-06 RX ORDER — MIDAZOLAM HYDROCHLORIDE 1 MG/ML
1 INJECTION INTRAMUSCULAR; INTRAVENOUS EVERY 30 MIN PRN
Status: DISCONTINUED | OUTPATIENT
Start: 2017-02-06 | End: 2017-02-10

## 2017-02-06 RX ORDER — DEXTROSE AND SODIUM CHLORIDE 5; .45 G/100ML; G/100ML
INJECTION, SOLUTION INTRAVENOUS CONTINUOUS
Status: ACTIVE | OUTPATIENT
Start: 2017-02-06 | End: 2017-02-07

## 2017-02-06 RX ORDER — CHLORHEXIDINE GLUCONATE 0.12 MG/ML
15 RINSE ORAL
Status: DISCONTINUED | OUTPATIENT
Start: 2017-02-06 | End: 2017-02-08

## 2017-02-06 RX ORDER — TEMAZEPAM 15 MG/1
15 CAPSULE ORAL NIGHTLY PRN
Status: DISCONTINUED | OUTPATIENT
Start: 2017-02-06 | End: 2017-02-10

## 2017-02-06 RX ORDER — ONDANSETRON 2 MG/ML
4 INJECTION INTRAMUSCULAR; INTRAVENOUS EVERY 6 HOURS PRN
Status: DISCONTINUED | OUTPATIENT
Start: 2017-02-06 | End: 2017-02-10

## 2017-02-06 NOTE — PROGRESS NOTES
[facility]  Progress Note    Renita Mark Patient Status:  Inpatient    1940 MRN [id number]   [facility] 6NE-A Attending Saul Anderson MD   [facility] Day # 4 PCP Yan Ward MD     Subjective: intubated, sedated.     Tele: st 8/17/2016, 21:24. INDICATIONS:  cp-resolved  PATIENT STATED HISTORY:  Mid chest pain and shortness of breath. FINDINGS:   Mild bibasilar atelectasis. Mild biapical pleural thickening. Lungs and pleural spaces are otherwise clear.  No congestion or pneum suppository 300 mg 300 mg Rectal Once   Or      aspirin tab 325 mg 325 mg Per NG Tube Once   docusate sodium (COLACE) cap 100 mg 100 mg Oral BID   Or      docusate sodium (COLACE) liquid 100 mg 100 mg Oral BID   magnesium hydroxide (MILK OF MAGNESIA) 400 M Known Allergies        Impression:  1. S/p CABG - day#0 - stable hemodynamically, but hypovolemic with C.I down to 1.7, CVP of 5 and SPAP at 17 mmHg. 2. Unstable angina pectoris upon presentation. LVEF 55-60%. 3. CAD with preserved LVEF 55%.   4. Recent U

## 2017-02-06 NOTE — ANESTHESIA POSTPROCEDURE EVALUATION
[address] [facility] Patient Status:  Inpatient   Age/Gender 68year old female MRN [id number]   [facility] 6NE-A Attending Tawanna Brian MD   [facility] Day # 4 PCP Serena Brown MD       Anesthesia Post-op Note    Procedure(s):

## 2017-02-07 ENCOUNTER — APPOINTMENT (OUTPATIENT)
Dept: GENERAL RADIOLOGY | Facility: HOSPITAL | Age: 77
DRG: 234 | End: 2017-02-07
Attending: PHYSICIAN ASSISTANT
Payer: MEDICARE

## 2017-02-07 ENCOUNTER — APPOINTMENT (OUTPATIENT)
Dept: GENERAL RADIOLOGY | Facility: HOSPITAL | Age: 77
DRG: 234 | End: 2017-02-07
Attending: THORACIC SURGERY (CARDIOTHORACIC VASCULAR SURGERY)
Payer: MEDICARE

## 2017-02-07 LAB
ATRIAL RATE: 62 BPM
ATRIAL RATE: 72 BPM
BASOPHILS # BLD AUTO: 0.02 X10(3) UL (ref 0–0.1)
BASOPHILS NFR BLD AUTO: 0.1 %
BUN BLD-MCNC: 18 MG/DL (ref 8–20)
CALCIUM BLD-MCNC: 8.3 MG/DL (ref 8.3–10.3)
CHLORIDE: 113 MMOL/L (ref 101–111)
CO2: 24 MMOL/L (ref 22–32)
CREAT BLD-MCNC: 1.72 MG/DL (ref 0.55–1.02)
EOSINOPHIL # BLD AUTO: 0 X10(3) UL (ref 0–0.3)
EOSINOPHIL NFR BLD AUTO: 0 %
ERYTHROCYTE [DISTWIDTH] IN BLOOD BY AUTOMATED COUNT: 18 % (ref 11.5–16)
GLUCOSE BLD-MCNC: 114 MG/DL (ref 65–99)
GLUCOSE BLD-MCNC: 114 MG/DL (ref 65–99)
GLUCOSE BLD-MCNC: 120 MG/DL (ref 65–99)
GLUCOSE BLD-MCNC: 122 MG/DL (ref 65–99)
GLUCOSE BLD-MCNC: 126 MG/DL (ref 65–99)
GLUCOSE BLD-MCNC: 142 MG/DL (ref 65–99)
GLUCOSE BLD-MCNC: 143 MG/DL (ref 65–99)
GLUCOSE BLD-MCNC: 151 MG/DL (ref 70–99)
GLUCOSE BLD-MCNC: 155 MG/DL (ref 65–99)
GLUCOSE BLD-MCNC: 168 MG/DL (ref 65–99)
GLUCOSE BLD-MCNC: 66 MG/DL (ref 65–99)
GLUCOSE BLD-MCNC: 70 MG/DL (ref 65–99)
GLUCOSE BLD-MCNC: 73 MG/DL (ref 65–99)
GLUCOSE BLD-MCNC: 92 MG/DL (ref 65–99)
GLUCOSE BLD-MCNC: 99 MG/DL (ref 65–99)
HAV IGM SER QL: 2.2 MG/DL (ref 1.7–3)
HCT VFR BLD AUTO: 29.8 % (ref 34–50)
HGB BLD-MCNC: 9.9 G/DL (ref 12–16)
IMMATURE GRANULOCYTE COUNT: 0.1 X10(3) UL (ref 0–1)
IMMATURE GRANULOCYTE RATIO %: 0.6 %
LYMPHOCYTES # BLD AUTO: 0.34 X10(3) UL (ref 0.9–4)
LYMPHOCYTES NFR BLD AUTO: 2 %
MCH RBC QN AUTO: 29.8 PG (ref 27–33.2)
MCHC RBC AUTO-ENTMCNC: 33.2 G/DL (ref 31–37)
MCV RBC AUTO: 89.8 FL (ref 81–100)
MONOCYTES # BLD AUTO: 0.47 X10(3) UL (ref 0.1–0.6)
MONOCYTES NFR BLD AUTO: 2.8 %
NEUTROPHIL ABS PRELIM: 15.79 X10 (3) UL (ref 1.3–6.7)
NEUTROPHILS # BLD AUTO: 15.79 X10(3) UL (ref 1.3–6.7)
NEUTROPHILS NFR BLD AUTO: 94.5 %
P AXIS: 78 DEGREES
P AXIS: 88 DEGREES
P-R INTERVAL: 220 MS
P-R INTERVAL: 240 MS
PLATELET # BLD AUTO: 145 10(3)UL (ref 150–450)
POTASSIUM SERPL-SCNC: 4.8 MMOL/L (ref 3.6–5.1)
Q-T INTERVAL: 440 MS
Q-T INTERVAL: 458 MS
QRS DURATION: 78 MS
QRS DURATION: 86 MS
QTC CALCULATION (BEZET): 464 MS
QTC CALCULATION (BEZET): 481 MS
R AXIS: 49 DEGREES
R AXIS: 60 DEGREES
RBC # BLD AUTO: 3.32 X10(6)UL (ref 3.8–5.1)
RED CELL DISTRIBUTION WIDTH-SD: 58.8 FL (ref 35.1–46.3)
SODIUM SERPL-SCNC: 145 MMOL/L (ref 136–144)
T AXIS: 106 DEGREES
T AXIS: 68 DEGREES
VENTRICULAR RATE: 62 BPM
VENTRICULAR RATE: 72 BPM
WBC # BLD AUTO: 16.7 X10(3) UL (ref 4–13)

## 2017-02-07 PROCEDURE — 99232 SBSQ HOSP IP/OBS MODERATE 35: CPT | Performed by: HOSPITALIST

## 2017-02-07 PROCEDURE — 71010 XR CHEST AP/PA (1 VIEW) (CPT=71010): CPT

## 2017-02-07 PROCEDURE — 71010 XR CHEST AP PORTABLE  (CPT=71010): CPT

## 2017-02-07 RX ORDER — LEVOTHYROXINE SODIUM 0.07 MG/1
75 TABLET ORAL
Status: DISCONTINUED | OUTPATIENT
Start: 2017-02-08 | End: 2017-02-10

## 2017-02-07 RX ORDER — FUROSEMIDE 10 MG/ML
20 INJECTION INTRAMUSCULAR; INTRAVENOUS
Status: COMPLETED | OUTPATIENT
Start: 2017-02-07 | End: 2017-02-08

## 2017-02-07 NOTE — PROGRESS NOTES
[facility]  Progress Note     Eliazar Alisekira Patient Status:  Inpatient    1940 MRN [id number]   [facility] 6NE-A Attending Marlys Smith MD   Hosp Day # 5 PCP Federica Flynn MD     Chief Complaint: cp    S: Patient is s/ Metoprolol Succinate ER  25 mg Oral Daily Beta Blocker   • ferrous sulfate  325 mg Oral TID CC   • Levothyroxine Sodium  50 mcg Oral Before breakfast   • Atorvastatin Calcium  20 mg Oral Nightly   • amiodarone HCl  200 mg Oral Daily with food   • aspirin

## 2017-02-07 NOTE — DIETARY NOTE
Nutrition Short Note    Dietitian consult received per cardiac rehab/CHF protocol. Pt to be educated by cardiac rehab staff and encouraged to attend outpatient classes taught by RD. DOREEN available PRN.     Jasen Boo RD, BHARGAVN

## 2017-02-07 NOTE — PROGRESS NOTES
[facility]  Progress Note     Ane Harada Patient Status:  Inpatient    1940 MRN [id number]   [facility] 6NE-A Attending Tawanna Brian MD   Hosp Day # 5 PCP Serena Brown MD     Chief Complaint: cp    S: Patient is s/ (PROTONIX) IV push  40 mg Intravenous QAM AC    Or   • Pantoprazole Sodium  40 mg Oral QAM AC   • Chlorhexidine Gluconate  15 mL Mouth/Throat [email or web address]   • Metoprolol Succinate ER  25 mg Oral Daily Beta Blocker   • ferrous sulfate  325 mg Oral TID CC

## 2017-02-07 NOTE — OCCUPATIONAL THERAPY NOTE
OCCUPATIONAL THERAPY EVALUATION - INPATIENT     Room Number: 0057/6976-Z  Evaluation Date: 2/7/2017  Type of Evaluation: Initial  Presenting Problem: CABG    Physician Order: IP Consult to Occupational Therapy  Reason for Therapy: ADL/IADL Dysfunction and and Equipment: Walk-in shower  Other Equipment: None    Occupation/Status: retired  Hand Dominance: Right  Drives: No       Prior Level of [place]: Pt was living in independent living apartment alone since her  was recently moved to SNF, she wa taking off regular upper body clothing?: A Little  -   Taking care of personal grooming such as brushing teeth?: A Little  -   Eating meals?: None    AM-PAC Score:  Score: 16  Approx Degree of Impairment: 53.32%  Standardized Score (AM-PAC Scale): 35.96  C Goal  Patient will be independent with bilateral AROM HEP (home exercise program).     Additional Goals:  Pt will verbalize knowledge of sternal precautions independently  Pt will demonstrate ability to follow sternal precautions with independently  Pt will

## 2017-02-07 NOTE — CM/SW NOTE
Received order for [facility] post CABG    Pt was admitted on 2/3 with CP and went for bypass surgery 2/6. No family at bedside. Met with pt. She is having a hard time remembering things. Per Rn she got some morphine.  She did tell me she lives at [facility]

## 2017-02-07 NOTE — PHYSICAL THERAPY NOTE
PHYSICAL THERAPY EVALUATION - INPATIENT     Room Number: 0746/4754-L  Evaluation Date: 2/7/2017  Type of Evaluation: Initial  Physician Order: PT Eval and Treat    Presenting Problem: Acute Chest Pain; CABG x 3 on 2/6  Reason for Therapy: Mobility Dysf N/A 8/26/2016    Comment Procedure: ESOPHAGOGASTRODUODENOSCOPY (EGD);   Surgeon: Todd Lamb MD;  Location: [address] [place] [address] ENDOSCOPY       HOME SITUATION  Type of Home: Independent living facility   Home Layout: One level  Stairs to Enter : 0  Stairs to [employer] (prior to session) and Sitting 107/65, 441/67    AM-PAC '6-Clicks' INPATIENT SHORT FORM - BASIC MOBILITY  How much difficulty does the patient currently have. ..  -   Turning over in bed (including adjusting bedclothes, sheets and blankets)?: A Lot   -   Si Exercise/Education Provided:  Bed mobility  Body mechanics  Functional activity tolerated  Transfer training    Patient End of Session: Up in chair;Needs met;Call light within reach;RN aware of session/findings; All patient questions and concerns addres

## 2017-02-07 NOTE — OPERATIVE REPORT
[facility]    PATIENT'S NAME: Amaris Richardson   ATTENDING PHYSICIAN: Jamal Mabry M.D.    OPERATING PHYSICIAN: Jack Garcia MD   PATIENT ACCOUNT#:   [id number]    LOCATION:  [address] [place] [address]  MEDICAL RECORD #:   [id number]       YOB: 1940 takeoff of the posterior descending. At this level, it was a 1.5 to 2 mm artery of good quality. Cardioplegia was given, following which the second obtuse marginal was bypassed. This was a 1.5 to 2 mm artery of good quality.   Cardioplegia was given, fol

## 2017-02-07 NOTE — PLAN OF CARE
Assumed care of pt @ 0730. Pt taken down to CVOR, arrived back in room around 1600. Pt intubated and sedated, usual lines but no pacer wires. Will attempt to extubate tonight per Dr. Karla Sanford. Sedation weaned off. Still on nitro, dobs, and insulin.   Fami

## 2017-02-07 NOTE — PROGRESS NOTES
[facility]  Progress Note    Evangelista Mccormackmone Patient Status:  Inpatient    1940 MRN [id number]   [facility] 6NE-A Attending Dalia Alamo MD   Hosp Day # 5 PCP Uriel Nagel MD     Subjective:  Pt doing OK, got nauseous firs faisal   - CKD - stable   - Neuro Intact   - Revisit CT later today once able to get upright   - CCU monitoring today      Elda Beckford  2/7/2017  8:06 AM

## 2017-02-07 NOTE — CONSULTS
[facility]      Endocrinology Consultation    Evangelista Sunshine Patient Status:  Inpatient    1940 MRN [id number]   [facility] 6NE-A Attending Dalia Alamo MD   [facility] Day # 5 PCP Uriel Nagel MD     Reason for Consultation: Sodium 70 MG Oral Tab Take 1 tablet (70 mg total) by mouth once a week. Disp: 12 tablet Rfl: 3 Taking   Rivaroxaban 20 MG Oral Tab Take 1 tablet (20 mg total) by mouth daily with food.  Disp: 30 tablet Rfl: 3 Taking   Levothyroxine Sodium 50 MCG Oral Tab Ta EGD N/A 8/19/2016    Comment Procedure: ESOPHAGOGASTRODUODENOSCOPY (EGD); Surgeon: Aniceto Hoffmann MD;  Location: [facility] ENDOSCOPY    EGD N/A 8/26/2016    Comment Procedure: ESOPHAGOGASTRODUODENOSCOPY (EGD);   Surgeon: Rosana Maxwell MD;  Location: norepinephrine (LEVOPHED) 4 mg/250 ml premix infusion, 0.5-30 mcg/min, Intravenous, Continuous PRN  •  Nitroprusside Sodium (NIPRIDE) 50 mg in dextrose 5 % 250 mL infusion, 0.1-4 mcg/kg/min, Intravenous, Continuous PRN  •  docusate sodium (COLACE) cap 100 glucose (DEX4) oral liquid 30 g, 30 g, Oral, Q15 Min PRN **OR** Glucose-Vitamin C (DEX-4) 4-0.006 g chewable tab 8 tablet, 8 tablet, Oral, Q15 Min PRN  •  Metoprolol Succinate ER (Toprol XL) 24 hr tab 25 mg, 25 mg, Oral, Daily Beta Blocker  •  ferrous sulf Ref. Range 1/12/2016 16:07 2/19/2016 11:44 8/17/2016 21:17 10/18/2016 09:20 1/23/2017 15:26   T4,Free (Direct) Latest Ref Range: 0.9-1.8 ng/dL   1.1 1.1    TSH Latest Ref Range: 0.350-5.500 mIU/mL 1.100 3.230 8.130 (H) 6.480 (H) 6.130 (H)       Impression

## 2017-02-07 NOTE — PROGRESS NOTES
[facility]  Progress Note    Marquis Kingsley Patient Status:  Inpatient    1940 MRN [id number]   [facility] 6NE-A Attending Deb Pozo MD   [facility] Day # 5 PCP Nena Hull MD       Subjective:  POD #1 CABG.   No chest pain 100 mg Oral BID   • mupirocin  1 Application Nasal BID   • ceFAZolin  2 g Intravenous Q8H   • pantoprazole (PROTONIX) IV push  40 mg Intravenous QAM AC    Or   • Pantoprazole Sodium  40 mg Oral QAM AC   • Chlorhexidine Gluconate  15 mL Mouth/Throat [email or web address]

## 2017-02-08 PROBLEM — Z95.1 S/P CABG (CORONARY ARTERY BYPASS GRAFT): Status: ACTIVE | Noted: 2017-02-08

## 2017-02-08 LAB
BUN BLD-MCNC: 24 MG/DL (ref 8–20)
CALCIUM BLD-MCNC: 8.7 MG/DL (ref 8.3–10.3)
CHLORIDE: 109 MMOL/L (ref 101–111)
CO2: 25 MMOL/L (ref 22–32)
CREAT BLD-MCNC: 1.77 MG/DL (ref 0.55–1.02)
GLUCOSE BLD-MCNC: 133 MG/DL (ref 65–99)
GLUCOSE BLD-MCNC: 135 MG/DL (ref 70–99)
GLUCOSE BLD-MCNC: 137 MG/DL (ref 65–99)
GLUCOSE BLD-MCNC: 140 MG/DL (ref 65–99)
GLUCOSE BLD-MCNC: 140 MG/DL (ref 65–99)
GLUCOSE BLD-MCNC: 156 MG/DL (ref 65–99)
GLUCOSE BLD-MCNC: 179 MG/DL (ref 65–99)
GLUCOSE BLD-MCNC: 204 MG/DL (ref 65–99)
ISTAT ACTIVATED CLOTTING TIME: 126 SECONDS (ref 74–137)
ISTAT ACTIVATED CLOTTING TIME: 446 SECONDS (ref 74–137)
ISTAT ACTIVATED CLOTTING TIME: 471 SECONDS (ref 74–137)
ISTAT ACTIVATED CLOTTING TIME: 492 SECONDS (ref 74–137)
ISTAT ACTIVATED CLOTTING TIME: 518 SECONDS (ref 74–137)
POTASSIUM SERPL-SCNC: 4.4 MMOL/L (ref 3.6–5.1)
SODIUM SERPL-SCNC: 144 MMOL/L (ref 136–144)

## 2017-02-08 PROCEDURE — 05H533Z INSERTION OF INFUSION DEVICE INTO RIGHT SUBCLAVIAN VEIN, PERCUTANEOUS APPROACH: ICD-10-PCS | Performed by: HOSPITALIST

## 2017-02-08 PROCEDURE — 99231 SBSQ HOSP IP/OBS SF/LOW 25: CPT | Performed by: HOSPITALIST

## 2017-02-08 RX ORDER — SODIUM CHLORIDE 0.9 % (FLUSH) 0.9 %
10 SYRINGE (ML) INJECTION EVERY 12 HOURS
Status: DISCONTINUED | OUTPATIENT
Start: 2017-02-08 | End: 2017-02-10

## 2017-02-08 RX ORDER — WARFARIN SODIUM 5 MG/1
5 TABLET ORAL
Status: COMPLETED | OUTPATIENT
Start: 2017-02-08 | End: 2017-02-08

## 2017-02-08 RX ORDER — AMIODARONE HYDROCHLORIDE 200 MG/1
400 TABLET ORAL 2 TIMES DAILY WITH MEALS
Status: DISCONTINUED | OUTPATIENT
Start: 2017-02-08 | End: 2017-02-10

## 2017-02-08 RX ORDER — AMIODARONE HYDROCHLORIDE 200 MG/1
200 TABLET ORAL ONCE
Status: COMPLETED | OUTPATIENT
Start: 2017-02-08 | End: 2017-02-08

## 2017-02-08 NOTE — OCCUPATIONAL THERAPY NOTE
OCCUPATIONAL THERAPY TREATMENT NOTE - INPATIENT     Room Number: 4968/1915-Z  Session: 1   Number of Visits to Meet Established Goals: 5    Presenting Problem: CABG    History related to current admission: Pt is s/p CABG    Problem List  Principal Problem: PAIN ASSESSMENT  Ratin  Location: no pain  Management Techniques: Activity promotion; Body mechanics;Breathing techniques;Relaxation;Repositioning     ACTIVITY TOLERANCE  O2 Device: Nasal cannula  No shortness of breath    ACTIVITIES OF DAILY JARRET Acute rehabilitation  OT Device Recommendations: None    PLAN  OT Treatment Plan: Balance activities; Energy conservation/work simplification techniques;ADL training;IADL training;Functional transfer training;UE strengthening/ROM; Endurance training;Patient/

## 2017-02-08 NOTE — OCCUPATIONAL THERAPY NOTE
Attempted to see Pt this am for OT session, however, Pt just went into AFIB.  Will follow up later, as schedule permits

## 2017-02-08 NOTE — PHYSICAL THERAPY NOTE
PHYSICAL THERAPY TREATMENT NOTE - INPATIENT    Room Number: 9083/4077-S     Session: 1   Number of Visits to Meet Established Goals: 5    Presenting Problem: Acute Chest Pain; CABG x 3 on 2/6    Problem List  Principal Problem:    Acute chest pain  Active promotion; Body mechanics;Breathing techniques;Relaxation;Repositioning    BALANCE                                                                                                                      Static Sitting: Fair  Dynamic Sitting: Fair           [facility] reading may not be accurate as patient reported no SOB or dizziness; ambulation was continued. Patient performed stand to sit transfer in room with CGA and cues for safe hand placement.  Patient asked about sternal precautions, able to remember 1/3, require

## 2017-02-08 NOTE — PROGRESS NOTES
[facility]  Progress Note    An Harada Patient Status:  Inpatient    1940 MRN [id number]   [facility] 6NE-A Attending Jayde Naqvi MD   [facility] Day # 6 PCP Serena Brown MD     Subjective:  Pt denies pain or SOB, note

## 2017-02-08 NOTE — PROGRESS NOTES
[facility]  Endocrinology Progress Note    Ochoa Lights Patient Status:  Inpatient    1940 MRN [id number]   [facility] 6NE-A Attending Shellie Byers MD   Hosp Day # 6 PCP Kyle Youssef MD     Subjective:  Feels better. 0.350-5.500 mIU/mL 1.100 3.230 8.130 (H) 6.480 (H) 6.130 (H)     Results for Blanca Preciado (MRN [id number]) as of 2/8/2017 15:05   Ref.  Range 2/8/2017 03:46 2/8/2017 06:39 2/8/2017 07:36 2/8/2017 11:30 2/8/2017 12:35   POC GLUCOSE Latest Ref Range: 65-9

## 2017-02-08 NOTE — PHYSICAL THERAPY NOTE
Attempted to see Pt this am, however, Pt just went into AFIB. Will follow up later, as schedule permits.

## 2017-02-08 NOTE — CM/SW NOTE
F/U with pt. . Much more awake today. We talked about acute rehab. She would be in agreement with referral to [facility]. Back up would be The [address] [place] [address]. She states she could stay with her daughter but she works all day. Pt is willing to go to rehab.    Will ecin re

## 2017-02-08 NOTE — PROGRESS NOTES
[facility]   Cardiology Progress Note     Subjective:   Feels very tired today, walked a little in her room with pt. Denies any pain or dyspnea. Went into afib within the past hour.      Objective:   /58 mmHg  Pulse 70  Temp(Src) 98.5 °F (36.9 °C change.     Labs:    Na 144, k 4.4, bun 24, cr 1.77    Medications:  • insulin aspart  1-40 Units Subcutaneous Q4H   • Levothyroxine Sodium  75 mcg Oral Before breakfast   • docusate sodium  100 mg Oral BID    Or   • docusate sodium  100 mg Oral BID   • mup op.    Atrial fib, but controlled (h/o PAF), on oral maintenance dose of oral amiodarone all time. No need for IV amio with no tachy.  We will resume Xeralto for stroke prevention (chest tubes are out), unless dr. Rosana Kaur prefers coumadin early post op and then

## 2017-02-08 NOTE — PROGRESS NOTES
[facility]  Progress Note     Providence Hatchet Patient Status:  Inpatient    1940 MRN [id number]   [facility] 6NE-A Attending Mario Hull MD   Hosp Day # 6 PCP Kris Aaron MD     Chief Complaint: cp    S: Patient feels ANÍBAL BLACKMAN   • Chlorhexidine Gluconate  15 mL Mouth/Throat [email or web address]   • Metoprolol Succinate ER  25 mg Oral Daily Beta Blocker   • ferrous sulfate  325 mg Oral TID CC   • Atorvastatin Calcium  20 mg Oral Nightly   • amiodarone HCl  200 mg Oral Daily with

## 2017-02-08 NOTE — PLAN OF CARE
Patient received as transfer. Alert and oriented, denies chest pain, shortness of breath, palpitations or any other discomfort. Mid-sternal incision intact with steri strips in place.   3 small incisions on left leg with staples, one small area about one

## 2017-02-09 LAB
BLOOD TYPE BARCODE: 9500
BLOOD TYPE BARCODE: 9500
ERYTHROCYTE [DISTWIDTH] IN BLOOD BY AUTOMATED COUNT: 19.3 % (ref 11.5–16)
GLUCOSE BLD-MCNC: 122 MG/DL (ref 65–99)
GLUCOSE BLD-MCNC: 144 MG/DL (ref 65–99)
GLUCOSE BLD-MCNC: 146 MG/DL (ref 65–99)
GLUCOSE BLD-MCNC: 158 MG/DL (ref 65–99)
GLUCOSE BLD-MCNC: 175 MG/DL (ref 65–99)
HCT VFR BLD AUTO: 28.8 % (ref 34–50)
HGB BLD-MCNC: 9.1 G/DL (ref 12–16)
INR BLD: 1.17 (ref 0.89–1.12)
MCH RBC QN AUTO: 29.5 PG (ref 27–33.2)
MCHC RBC AUTO-ENTMCNC: 31.6 G/DL (ref 31–37)
MCV RBC AUTO: 93.5 FL (ref 81–100)
PLATELET # BLD AUTO: 141 10(3)UL (ref 150–450)
PSA SERPL DL<=0.01 NG/ML-MCNC: 15.3 SECONDS (ref 12.3–14.8)
RBC # BLD AUTO: 3.08 X10(6)UL (ref 3.8–5.1)
RED CELL DISTRIBUTION WIDTH-SD: 66 FL (ref 35.1–46.3)
WBC # BLD AUTO: 14.8 X10(3) UL (ref 4–13)

## 2017-02-09 PROCEDURE — 99232 SBSQ HOSP IP/OBS MODERATE 35: CPT | Performed by: HOSPITALIST

## 2017-02-09 RX ORDER — WARFARIN SODIUM 5 MG/1
5 TABLET ORAL
Status: COMPLETED | OUTPATIENT
Start: 2017-02-09 | End: 2017-02-09

## 2017-02-09 NOTE — OCCUPATIONAL THERAPY NOTE
IP OT attempt to see patient for therapeutic treatment. Pt currently with Cardiac Rehab staff. Will attempt again as able.

## 2017-02-09 NOTE — OCCUPATIONAL THERAPY NOTE
OCCUPATIONAL THERAPY TREATMENT NOTE - INPATIENT     Room Number: 9916/0231-Z  Session: 2  Number of Visits to Meet Established Goals: 5    Presenting Problem: CABG    History related to current admission: Pt is s/p CABG    Problem List  Principal Problem: RESTRICTION  Weight Bearing Restriction: None      PAIN ASSESSMENT  Ratin  Location: no pain  Management Techniques: Activity promotion; Body mechanics;Breathing techniques;Relaxation;Repositioning     ACTIVITY TOLERANCE  Room air  No shortness of breat session/findings; All patient questions and concerns addressed; Family present; Discussed recommendations with /    ASSESSMENT   Patient is a 68year old female admitted 2/2/2017 for CABG.   In OT session 2 of 5 patient is progressing

## 2017-02-09 NOTE — PROGRESS NOTES
[facility] HOSPITAL[facility]  Progress Note     Eliazar Beltran Patient Status:  Inpatient    1940 MRN [id number]   [facility] 6NE-A Attending Marlys Smith MD   Hosp Day # 7 PCP Federica Flynn MD     Chief Complaint: cp    S: Patient feels mcg Oral Before breakfast   • docusate sodium  100 mg Oral BID    Or   • docusate sodium  100 mg Oral BID   • mupirocin  1 Application Nasal BID   • pantoprazole (PROTONIX) IV push  40 mg Intravenous QAM AC    Or   • Pantoprazole Sodium  40 mg Oral QAM AC

## 2017-02-09 NOTE — PHYSICAL THERAPY NOTE
PHYSICAL THERAPY TREATMENT NOTE - INPATIENT    Room Number: 8635/6386-C     Session: 2Number of Visits to Meet Established Goals: 5    Presenting Problem: Acute Chest Pain; CABG x 3 on 2/6    Problem List  Principal Problem:    S/P CABG (coronary artery b ASSESSMENT   Rating: 3  Location: Sternum (In supine)  Management Techniques: Activity promotion; Body mechanics;Breathing techniques;Relaxation;Repositioning    BALANCE post CABG x 3. Pt is progressing in all aspects of functional mobility. pt continues to need cues to recall sternal precautions during functional tasks. Pt presents with decreased balance, dec endurance, dec strength and pain in sternal incision site.   William

## 2017-02-09 NOTE — CONSULTS
.[address] [facility] Patient Status:  Inpatient    1940 MRN [id number]   [facility] 8NE-A Attending Jayde Naqvi MD   [address] Day # 6 PCP Serena Brown MD     Patient Identification  Jammie Harada is a 68 year artery disease), native coronary artery    • Abnormal mammogram    • History of pubovaginal sling 2007   • Heart attack [facility])    • Osteoarthritis    • History of blood transfusion      after child birth   • Arrhythmia    • A-fib ([facility] [address]    • Anemia    • GI b in sodium chloride 0.9 % 250 mL infusion 1-10 mcg/min Intravenous Continuous   [DISCONTINUED] bacitracin in saline for procedural use irrigation   PRN   [DISCONTINUED] gelatin adsorbable (GELFOAM SPONGE SIZE 100) 100 sponge   PRN   [DISCONTINUED] heparin 6 magnesium hydroxide (MILK OF MAGNESIA) 400 MG/5ML suspension 10 mL 10 mL Oral BID PRN   PEG 3350 (MIRALAX) powder packet 17 g 1 packet Oral Daily PRN   bisacodyl (DULCOLAX) rectal suppository 10 mg 10 mg Rectal Daily PRN   ondansetron HCl (ZOFRAN) inject inch Topical Q8H   [DISCONTINUED] acetaminophen (TYLENOL) tab 650 mg 650 mg Oral Q6H PRN   [COMPLETED] acetaminophen (TYLENOL) 325 MG tab      [DISCONTINUED] nitroGLYCERIN infusion 50mg in D5W 250ml 5-400 mcg/min Intravenous Continuous   [DISCONTINUED] nit 0.4 mg Sublingual Q5 Min PRN   [DISCONTINUED] 0.9%  NaCl infusion  Intravenous Continuous   [DISCONTINUED] Enoxaparin Sodium (LOVENOX) 40 MG/0.4ML injection 40 mg 40 mg Subcutaneous Daily   [DISCONTINUED] morphINE sulfate (PF) 2 MG/ML injection 1 mg 1 mg I cooperative, no distress, appears stated age. Head:  Normocephalic, without obvious abnormality, atraumatic. Eyes:  Conjunctivae/lids clear. PERRL, EOMs intact. Vision functional.   Ears/Nose/Throat: Hearing intact.  Lips, mucosa, and tongue normal. Teet pain.    Risk for infection. Risk for contractures and stiffness with consequent functional impairments.     Risk for thromboembolic disease with need for careful DVT prophylaxis, potential for bleeding and hematoma or hemarthrosis (and aggravating anemia)

## 2017-02-09 NOTE — PROGRESS NOTES
[facility]  Progress Note    Estanislao Rinne Patient Status:  Inpatient    1940 MRN [id number]   [facility] 8NE-A Attending Ludmila Smith MD   Hosp Day # 7 PCP Amish Borrero MD     Subjective: mild incisional discomfort, CREATSERUM  1.55*  1.72*  1.77*   CA  8.5  8.3  8.7   MG  2.3  2.2   --    GLU  136*  151*  135*       Imaging:  Xr Chest Ap/pa (1 View) (cpt=71010)    2/7/2017  PROCEDURE:  XR CHEST AP/PA (1 VIEW) (CPT=71010)  TECHNIQUE:  AP chest radiograph was obtaine stroke 0.4 cm apical pneumothorax. Improving pleural-parenchymal changes left lung base. Mild vascular redistribution without interstitial edema.  The floor nurse, Jose Daniel Estrada was notified of these critical value results by telephone at [phone]  hours on 2/7/17 and Medications:    Current Facility-Administered Medications:  Warfarin Sodium (COUMADIN) tab 5 mg 5 mg Oral Once at night   Normal Saline Flush 0.9 % injection 10 mL 10 mL Intravenous Q12H   amiodarone HCl (PACERONE) tab 400 mg 400 mg Oral BID with scott potassium chloride IVPB premix 40 mEq 40 mEq Intravenous PRN   calcium gluconate 3 g in sodium chloride 0.9 % 100 mL IVPB 3 g Intravenous PRN   magnesium sulfate IVPB premix 1 g 1 g Intravenous PRN   magnesium sulfate IVPB premix 2 g 2 g Intravenous PRN base.    Plan:  - telemetry monitoring  - amiodarone 400 mg po BID - will taper to daily upon discharge and thn 200 mg po daily a week later  - , statin, metoprolol  - anticoagulation with Coumadin post-op and it will be changed back to [place] in 3

## 2017-02-10 VITALS
RESPIRATION RATE: 20 BRPM | TEMPERATURE: 98 F | BODY MASS INDEX: 28 KG/M2 | WEIGHT: 145.5 LBS | OXYGEN SATURATION: 93 % | SYSTOLIC BLOOD PRESSURE: 107 MMHG | HEART RATE: 104 BPM | DIASTOLIC BLOOD PRESSURE: 66 MMHG

## 2017-02-10 LAB
ATRIAL RATE: 91 BPM
BASOPHILS # BLD AUTO: 0.01 X10(3) UL (ref 0–0.1)
BASOPHILS NFR BLD AUTO: 0.1 %
BUN BLD-MCNC: 29 MG/DL (ref 8–20)
CALCIUM BLD-MCNC: 8.3 MG/DL (ref 8.3–10.3)
CHLORIDE: 107 MMOL/L (ref 101–111)
CO2: 30 MMOL/L (ref 22–32)
CREAT BLD-MCNC: 1.18 MG/DL (ref 0.55–1.02)
EOSINOPHIL # BLD AUTO: 0.07 X10(3) UL (ref 0–0.3)
EOSINOPHIL NFR BLD AUTO: 0.6 %
ERYTHROCYTE [DISTWIDTH] IN BLOOD BY AUTOMATED COUNT: 19.2 % (ref 11.5–16)
GLUCOSE BLD-MCNC: 127 MG/DL (ref 70–99)
GLUCOSE BLD-MCNC: 133 MG/DL (ref 65–99)
GLUCOSE BLD-MCNC: 136 MG/DL (ref 65–99)
HCT VFR BLD AUTO: 27 % (ref 34–50)
HGB BLD-MCNC: 8.4 G/DL (ref 12–16)
IMMATURE GRANULOCYTE COUNT: 0.06 X10(3) UL (ref 0–1)
IMMATURE GRANULOCYTE RATIO %: 0.6 %
INR BLD: 2.05 (ref 0.89–1.12)
LYMPHOCYTES # BLD AUTO: 1.44 X10(3) UL (ref 0.9–4)
LYMPHOCYTES NFR BLD AUTO: 13.3 %
MCH RBC QN AUTO: 29.2 PG (ref 27–33.2)
MCHC RBC AUTO-ENTMCNC: 31.1 G/DL (ref 31–37)
MCV RBC AUTO: 93.8 FL (ref 81–100)
MONOCYTES # BLD AUTO: 0.7 X10(3) UL (ref 0.1–0.6)
MONOCYTES NFR BLD AUTO: 6.5 %
NEUTROPHIL ABS PRELIM: 8.57 X10 (3) UL (ref 1.3–6.7)
NEUTROPHILS # BLD AUTO: 8.57 X10(3) UL (ref 1.3–6.7)
NEUTROPHILS NFR BLD AUTO: 78.9 %
PLATELET # BLD AUTO: 144 10(3)UL (ref 150–450)
POTASSIUM SERPL-SCNC: 3.7 MMOL/L (ref 3.6–5.1)
PSA SERPL DL<=0.01 NG/ML-MCNC: 23.9 SECONDS (ref 12.3–14.8)
Q-T INTERVAL: 376 MS
QRS DURATION: 88 MS
QTC CALCULATION (BEZET): 470 MS
R AXIS: 67 DEGREES
RBC # BLD AUTO: 2.88 X10(6)UL (ref 3.8–5.1)
RED CELL DISTRIBUTION WIDTH-SD: 66.7 FL (ref 35.1–46.3)
SODIUM SERPL-SCNC: 142 MMOL/L (ref 136–144)
T AXIS: -54 DEGREES
VENTRICULAR RATE: 94 BPM
WBC # BLD AUTO: 10.9 X10(3) UL (ref 4–13)

## 2017-02-10 PROCEDURE — 99232 SBSQ HOSP IP/OBS MODERATE 35: CPT | Performed by: HOSPITALIST

## 2017-02-10 RX ORDER — WARFARIN SODIUM 1 MG/1
1 TABLET ORAL NIGHTLY
Qty: 30 TABLET | Refills: 0 | Status: SHIPPED | OUTPATIENT
Start: 2017-02-10 | End: 2017-02-17 | Stop reason: DRUGHIGH

## 2017-02-10 RX ORDER — HYDROCODONE BITARTRATE AND ACETAMINOPHEN 5; 325 MG/1; MG/1
1-2 TABLET ORAL
Qty: 60 TABLET | Refills: 0 | Status: SHIPPED | OUTPATIENT
Start: 2017-02-10 | End: 2017-05-25

## 2017-02-10 RX ORDER — AMIODARONE HYDROCHLORIDE 200 MG/1
400 TABLET ORAL DAILY
Qty: 30 TABLET | Refills: 3 | Status: SHIPPED | OUTPATIENT
Start: 2017-02-10 | End: 2017-03-28

## 2017-02-10 RX ORDER — POTASSIUM CHLORIDE 20 MEQ/1
40 TABLET, EXTENDED RELEASE ORAL ONCE
Status: COMPLETED | OUTPATIENT
Start: 2017-02-10 | End: 2017-02-10

## 2017-02-10 RX ORDER — ALENDRONATE SODIUM 70 MG/1
70 TABLET ORAL WEEKLY
Qty: 12 TABLET | Refills: 3 | Status: SHIPPED
Start: 2017-03-10 | End: 2017-08-22

## 2017-02-10 NOTE — PROGRESS NOTES
Attempted to call josseline for report but nurse not available at this time. Gave them my phone number per their request and nurse is to call me back.

## 2017-02-10 NOTE — PLAN OF CARE
A&ox4, s/p CABG x3  Room air while awake, 2L NC at noc, , AR=638  midsternal incision c/d/i, painted w/ betadine  L leg donor sites x3, painted w/ betadine, incision over knee re-wrapped with gauze & ace bandage d/t mild drainage  afib on tele, 80's, co

## 2017-02-10 NOTE — PROGRESS NOTES
Met with patient and sister. to discuss discharge instructions, activity restrictions and recommendations, follow up visits, all questions answered, encouraged to call with any questions or concerns. Will follow up on d/c from [facility].   Diana Samayoa RN

## 2017-02-10 NOTE — PROGRESS NOTES
[facility]  Progress Note     Ochoa Lights Patient Status:  Inpatient    1940 MRN [id number]   [facility] 6NE-A Attending Zaira Bedoya MD   [facility] Day # 8 PCP Kyle Youssef MD     Chief Complaint: cp    S: Patient says Oral QAM AC   • Metoprolol Succinate ER  25 mg Oral Daily Beta Blocker   • ferrous sulfate  325 mg Oral TID CC   • Atorvastatin Calcium  20 mg Oral Nightly   • aspirin  325 mg Oral Daily       ASSESSMENT / PLAN:     1. UA POD#4 s/p cabg-postop recovery  2.

## 2017-02-10 NOTE — PHYSICAL THERAPY NOTE
PHYSICAL THERAPY TREATMENT NOTE - INPATIENT    Room Number: 6174/2429-P     Session: 3  Number of Visits to Meet Established Goals: 5    Presenting Problem: Acute Chest Pain; CABG x 3 on 2/6    Problem List  Principal Problem:    S/P CABG (coronary artery Ratin  Location: Sternum (In supine)  Management Techniques: Activity promotion; Body mechanics;Breathing techniques;Relaxation;Repositioning    BALANCE extension     Upper Extremity Elbow flex/ext,  - open/close, Scapular Retraction and shoulder shrugs, elbow circles     Position Sitting     Repetitions   10   Sets   1     Patient End of Session: Up in chair; With [facility] staff;Needs met;Call light within r

## 2017-02-10 NOTE — PROGRESS NOTES
Report called to Fariah at [facility]. Discharged to [facility] per wheelchair accompanied by [employer]. Transfer papers given to Westley seymour.

## 2017-02-10 NOTE — PROGRESS NOTES
[facility]  Progress Note    Rinku Chi Patient Status:  Inpatient    1940 MRN [id number]   [facility] 8NE-A Attending Laura Viramontes MD   Hosp Day # 8 PCP Maryanne Gutierrez MD     Subjective:  Pt doing better.  Still has tomorrow      Catina Irby  2/10/2017  10:02 AM

## 2017-02-10 NOTE — PROGRESS NOTES
[facility] Card[facility] Progress Note    Subjective:  Feels ready to be discharged. Walking in the halls, working on her breathing.      Objective:  /78 mmHg  Pulse 94  Temp(Src) 98 °F (36.7 °C) (Oral)  Resp 20  Wt 145 lb 8.1 oz (66 kg)  SpO2 97%    Teleme

## 2017-02-10 NOTE — CM/SW NOTE
Pt can go to room 1109 and report to be called to     Isolation can be d/c per RN    Pt set up for medicar 4pm  Family visiting and pt aware    Mikel Giron RN/CM  NICU  [phone]

## 2017-02-10 NOTE — PROGRESS NOTES
Amiodarone pill found on the floor by PCT at [address] of when the pill is from (day or night dose).   Per MAR both doses have been given  Pt is currently NSR with a rate of 90  Will monitor patient's HR and notify MD of any changes

## 2017-02-11 LAB
GLUCOSE BLD-MCNC: 145 MG/DL (ref 65–99)
ISTAT BLOOD GAS BASE DEFICIT: -3 MMOL/L
ISTAT BLOOD GAS HCO3: 21.4 MEQ/L (ref 22–26)
ISTAT BLOOD GAS O2 SATURATION: 100 % (ref 92–100)
ISTAT BLOOD GAS PCO2: 32 MMHG (ref 35–45)
ISTAT BLOOD GAS PH: 7.43 (ref 7.35–7.45)
ISTAT BLOOD GAS PO2: 217 MMHG (ref 80–105)
ISTAT BLOOD GAS TCO2: 22 MMOL/L (ref 22–32)
ISTAT HEMATOCRIT: 20 % (ref 37–54)
ISTAT IONIZED CALCIUM: 1.27 MMOL/L (ref 1.12–1.32)
ISTAT POTASSIUM: 4.4 MMOL/L (ref 3.6–5.1)
ISTAT SODIUM: 138 MMOL/L (ref 136–144)

## 2017-02-11 NOTE — DISCHARGE SUMMARY
[facility]PITAL[facility]  DISCHARGE SUMMARY     Marciano Em Patient Status:  Inpatient    1940 MRN [id number]   [facility] 8NE-A Attending No att. providers found   Hosp Day # 8 PCP Philip Garcia MD     Date of Admission: 2017 constipation issues, that was dealt with. Otherwise did very well.         Consultants:  • [facility] 64    Discharge Medication List:     Discharge Medications      START taking these medications       Instructions Prescription details    HYDROcodone-ac 325 mg on 2/10/2017  1:16 PM        Take 1 tablet (325 mg total) by mouth 3 (three) times daily with meals.     Quantity:  270 tablet   Refills:  3       Levothyroxine Sodium 50 MCG Tabs   Last time this was given:  75 mcg on 2/10/2017  5:08 AM   Commonly Alendronate Sodium 70 MG Tabs          Follow-up appointment:   Juan Francisco Lantigua MD  [facility] [phone]    In 1 week  Make an appointment ASAP on discharge from [facility]

## 2017-02-17 ENCOUNTER — TELEPHONE (OUTPATIENT)
Dept: CARDIOLOGY UNIT | Facility: HOSPITAL | Age: 77
End: 2017-02-17

## 2017-02-17 RX ORDER — BISACODYL 10 MG
10 SUPPOSITORY, RECTAL RECTAL DAILY PRN
COMMUNITY
End: 2017-05-25

## 2017-02-17 RX ORDER — FUROSEMIDE 20 MG/1
60 TABLET ORAL DAILY
COMMUNITY
End: 2017-03-10 | Stop reason: DRUGHIGH

## 2017-02-17 RX ORDER — SENNOSIDES 8.6 MG
17.2 TABLET ORAL DAILY PRN
COMMUNITY
End: 2017-03-28

## 2017-02-17 RX ORDER — ASPIRIN 325 MG
325 TABLET ORAL DAILY
COMMUNITY
End: 2019-01-18

## 2017-02-17 RX ORDER — DOCUSATE SODIUM 100 MG/1
100 CAPSULE, LIQUID FILLED ORAL 2 TIMES DAILY
COMMUNITY
End: 2019-01-18

## 2017-02-17 RX ORDER — LEVOTHYROXINE SODIUM 0.07 MG/1
75 TABLET ORAL
COMMUNITY
End: 2017-03-28

## 2017-02-17 RX ORDER — VALACYCLOVIR HYDROCHLORIDE 1 G/1
TABLET, FILM COATED ORAL 3 TIMES DAILY
COMMUNITY
End: 2017-05-25

## 2017-02-17 RX ORDER — MULTIVITAMIN/IRON/FOLIC ACID 18MG-0.4MG
250 TABLET ORAL DAILY
COMMUNITY
End: 2019-01-18

## 2017-02-17 NOTE — PROGRESS NOTES
Patient at [facility], chest xray reveals large left pleural effusion, pt mildly sob with activity, on coumadin for afib. D/w Dr. Whitney Tavera, hold coumadin (starting yesterday), plan for left thoracentesis on Monday at 3pm.  D/w resident MD and Rn at [facility].

## 2017-02-20 ENCOUNTER — HOSPITAL ENCOUNTER (OUTPATIENT)
Dept: GENERAL RADIOLOGY | Facility: HOSPITAL | Age: 77
Discharge: HOME | End: 2017-02-20
Attending: RADIOLOGY
Payer: MEDICARE

## 2017-02-20 ENCOUNTER — APPOINTMENT (OUTPATIENT)
Dept: LAB | Facility: HOSPITAL | Age: 77
End: 2017-02-20
Attending: THORACIC SURGERY (CARDIOTHORACIC VASCULAR SURGERY)
Payer: MEDICARE

## 2017-02-20 ENCOUNTER — HOSPITAL ENCOUNTER (OUTPATIENT)
Dept: ULTRASOUND IMAGING | Facility: HOSPITAL | Age: 77
Discharge: HOME | End: 2017-02-20
Attending: THORACIC SURGERY (CARDIOTHORACIC VASCULAR SURGERY)
Payer: MEDICARE

## 2017-02-20 VITALS
HEIGHT: 62 IN | BODY MASS INDEX: 26.68 KG/M2 | WEIGHT: 145 LBS | RESPIRATION RATE: 16 BRPM | DIASTOLIC BLOOD PRESSURE: 60 MMHG | SYSTOLIC BLOOD PRESSURE: 99 MMHG | OXYGEN SATURATION: 92 % | TEMPERATURE: 98 F | HEART RATE: 96 BPM

## 2017-02-20 DIAGNOSIS — J90 PLEURAL EFFUSION: ICD-10-CM

## 2017-02-20 DIAGNOSIS — J90 PLEURAL EFFUSION: Primary | ICD-10-CM

## 2017-02-20 LAB
INR BLD: 1.07 (ref 0.89–1.12)
PSA SERPL DL<=0.01 NG/ML-MCNC: 14.2 SECONDS (ref 12.3–14.8)

## 2017-02-20 PROCEDURE — 32555 ASPIRATE PLEURA W/ IMAGING: CPT

## 2017-02-20 PROCEDURE — 82664 ELECTROPHORETIC TEST: CPT

## 2017-02-20 PROCEDURE — 36415 COLL VENOUS BLD VENIPUNCTURE: CPT

## 2017-02-20 PROCEDURE — 85610 PROTHROMBIN TIME: CPT

## 2017-02-20 PROCEDURE — 87205 SMEAR GRAM STAIN: CPT | Performed by: THORACIC SURGERY (CARDIOTHORACIC VASCULAR SURGERY)

## 2017-02-20 PROCEDURE — 71010 XR CHEST AP PORTABLE  (CPT=71010): CPT

## 2017-02-20 PROCEDURE — 87070 CULTURE OTHR SPECIMN AEROBIC: CPT | Performed by: THORACIC SURGERY (CARDIOTHORACIC VASCULAR SURGERY)

## 2017-02-20 NOTE — IMAGING NOTE
Pt here from [address] [place] [address] facility for L sided thoracentesis. NPO since 0830, denies pain, took last Coumadin was Thursday and last ASA was 2/19. Brought in per [employer]. Dr Bethany Mathis performed procedure and 1200 cc drained from L side.  Dres

## 2017-02-20 NOTE — PROCEDURES
[facility]  Procedure Note    Karma Irby Patient Status:  Outpatient    1940 MRN [id number]   Location [address] [place] [address] Attending Laurie Ac MD   Hosp Day # 0 PCP Elana Aceves MD     Procedure: Left thoracentesis    Pr

## 2017-02-23 ENCOUNTER — ANESTHESIA EVENT (OUTPATIENT)
Dept: SURGERY | Facility: HOSPITAL | Age: 77
End: 2017-02-23
Payer: COMMERCIAL

## 2017-02-24 ENCOUNTER — APPOINTMENT (OUTPATIENT)
Dept: GENERAL RADIOLOGY | Facility: HOSPITAL | Age: 77
End: 2017-02-24
Attending: THORACIC SURGERY (CARDIOTHORACIC VASCULAR SURGERY)
Payer: COMMERCIAL

## 2017-02-24 ENCOUNTER — ANESTHESIA (OUTPATIENT)
Dept: SURGERY | Facility: HOSPITAL | Age: 77
End: 2017-02-24
Payer: COMMERCIAL

## 2017-02-24 ENCOUNTER — SURGERY (OUTPATIENT)
Age: 77
End: 2017-02-24

## 2017-02-24 ENCOUNTER — HOSPITAL ENCOUNTER (OUTPATIENT)
Facility: HOSPITAL | Age: 77
Setting detail: HOSPITAL OUTPATIENT SURGERY
Discharge: NURSING, REHAB OR LONG-TERM CARE | End: 2017-02-24
Attending: THORACIC SURGERY (CARDIOTHORACIC VASCULAR SURGERY) | Admitting: THORACIC SURGERY (CARDIOTHORACIC VASCULAR SURGERY)
Payer: COMMERCIAL

## 2017-02-24 VITALS
OXYGEN SATURATION: 97 % | DIASTOLIC BLOOD PRESSURE: 72 MMHG | BODY MASS INDEX: 26.87 KG/M2 | HEART RATE: 72 BPM | SYSTOLIC BLOOD PRESSURE: 140 MMHG | WEIGHT: 146 LBS | RESPIRATION RATE: 16 BRPM | HEIGHT: 62 IN | TEMPERATURE: 98 F

## 2017-02-24 DIAGNOSIS — J90 RECURRENT LEFT PLEURAL EFFUSION: Primary | ICD-10-CM

## 2017-02-24 LAB
INR BLD: 1.31 (ref 0.89–1.12)
PSA SERPL DL<=0.01 NG/ML-MCNC: 16.7 SECONDS (ref 12.3–14.8)

## 2017-02-24 PROCEDURE — 87070 CULTURE OTHR SPECIMN AEROBIC: CPT | Performed by: THORACIC SURGERY (CARDIOTHORACIC VASCULAR SURGERY)

## 2017-02-24 PROCEDURE — 87116 MYCOBACTERIA CULTURE: CPT | Performed by: THORACIC SURGERY (CARDIOTHORACIC VASCULAR SURGERY)

## 2017-02-24 PROCEDURE — 71010 XR CHEST AP PORTABLE  (CPT=71010): CPT

## 2017-02-24 PROCEDURE — 87206 SMEAR FLUORESCENT/ACID STAI: CPT | Performed by: THORACIC SURGERY (CARDIOTHORACIC VASCULAR SURGERY)

## 2017-02-24 PROCEDURE — 88185 FLOWCYTOMETRY/TC ADD-ON: CPT | Performed by: THORACIC SURGERY (CARDIOTHORACIC VASCULAR SURGERY)

## 2017-02-24 PROCEDURE — 88184 FLOWCYTOMETRY/ TC 1 MARKER: CPT | Performed by: THORACIC SURGERY (CARDIOTHORACIC VASCULAR SURGERY)

## 2017-02-24 PROCEDURE — 0B9P30Z DRAINAGE OF LEFT PLEURA WITH DRAINAGE DEVICE, PERCUTANEOUS APPROACH: ICD-10-PCS | Performed by: THORACIC SURGERY (CARDIOTHORACIC VASCULAR SURGERY)

## 2017-02-24 PROCEDURE — 88108 CYTOPATH CONCENTRATE TECH: CPT | Performed by: THORACIC SURGERY (CARDIOTHORACIC VASCULAR SURGERY)

## 2017-02-24 PROCEDURE — 87205 SMEAR GRAM STAIN: CPT | Performed by: THORACIC SURGERY (CARDIOTHORACIC VASCULAR SURGERY)

## 2017-02-24 PROCEDURE — 87102 FUNGUS ISOLATION CULTURE: CPT | Performed by: THORACIC SURGERY (CARDIOTHORACIC VASCULAR SURGERY)

## 2017-02-24 PROCEDURE — 88342 IMHCHEM/IMCYTCHM 1ST ANTB: CPT | Performed by: THORACIC SURGERY (CARDIOTHORACIC VASCULAR SURGERY)

## 2017-02-24 PROCEDURE — 88305 TISSUE EXAM BY PATHOLOGIST: CPT | Performed by: THORACIC SURGERY (CARDIOTHORACIC VASCULAR SURGERY)

## 2017-02-24 PROCEDURE — 88341 IMHCHEM/IMCYTCHM EA ADD ANTB: CPT | Performed by: THORACIC SURGERY (CARDIOTHORACIC VASCULAR SURGERY)

## 2017-02-24 PROCEDURE — 85610 PROTHROMBIN TIME: CPT

## 2017-02-24 RX ORDER — HYDROMORPHONE HYDROCHLORIDE 1 MG/ML
INJECTION, SOLUTION INTRAMUSCULAR; INTRAVENOUS; SUBCUTANEOUS
Status: COMPLETED
Start: 2017-02-24 | End: 2017-02-24

## 2017-02-24 RX ORDER — HYDROCODONE BITARTRATE AND ACETAMINOPHEN 5; 325 MG/1; MG/1
2 TABLET ORAL EVERY 4 HOURS PRN
Status: DISCONTINUED | OUTPATIENT
Start: 2017-02-24 | End: 2017-02-24

## 2017-02-24 RX ORDER — CEPHALEXIN 500 MG/1
500 CAPSULE ORAL 2 TIMES DAILY
Qty: 14 CAPSULE | Refills: 0 | Status: SHIPPED | OUTPATIENT
Start: 2017-02-24 | End: 2017-03-03

## 2017-02-24 RX ORDER — HYDROMORPHONE HYDROCHLORIDE 1 MG/ML
0.4 INJECTION, SOLUTION INTRAMUSCULAR; INTRAVENOUS; SUBCUTANEOUS EVERY 5 MIN PRN
Status: DISCONTINUED | OUTPATIENT
Start: 2017-02-24 | End: 2017-02-24

## 2017-02-24 RX ORDER — LIDOCAINE HYDROCHLORIDE 10 MG/ML
INJECTION, SOLUTION INFILTRATION; PERINEURAL AS NEEDED
Status: DISCONTINUED | OUTPATIENT
Start: 2017-02-24 | End: 2017-02-24

## 2017-02-24 RX ORDER — FUROSEMIDE 10 MG/ML
40 INJECTION INTRAMUSCULAR; INTRAVENOUS ONCE
Status: COMPLETED | OUTPATIENT
Start: 2017-02-24 | End: 2017-02-24

## 2017-02-24 RX ORDER — SODIUM CHLORIDE, SODIUM LACTATE, POTASSIUM CHLORIDE, CALCIUM CHLORIDE 600; 310; 30; 20 MG/100ML; MG/100ML; MG/100ML; MG/100ML
INJECTION, SOLUTION INTRAVENOUS CONTINUOUS
Status: DISCONTINUED | OUTPATIENT
Start: 2017-02-24 | End: 2017-02-24

## 2017-02-24 RX ORDER — HYDROCODONE BITARTRATE AND ACETAMINOPHEN 5; 325 MG/1; MG/1
1 TABLET ORAL EVERY 4 HOURS PRN
Status: DISCONTINUED | OUTPATIENT
Start: 2017-02-24 | End: 2017-02-24

## 2017-02-24 RX ORDER — NALOXONE HYDROCHLORIDE 0.4 MG/ML
80 INJECTION, SOLUTION INTRAMUSCULAR; INTRAVENOUS; SUBCUTANEOUS AS NEEDED
Status: DISCONTINUED | OUTPATIENT
Start: 2017-02-24 | End: 2017-02-24

## 2017-02-24 NOTE — OPERATIVE REPORT
Cardiothoracic   Operative Report    Pre-op Diagnosis: recurrent left pleural effusion    Post-op Diagnosis: same    Procedure: Placement of left pleurx catheter    Surgeon: Ebony Muro    Assistant: Oneil ALTAMIRANO    Findings: ~ 1100 cc of tan appearing flu

## 2017-02-24 NOTE — OR NURSING
MASOOD BERTRAND HERE TO TALK WITH PATIENT.   REPORT GIVEN TO COLE BERTRAND AT [address] [place] [address] HERE TO TRANSPORT TO Andrey Tovar

## 2017-02-24 NOTE — ANESTHESIA PREPROCEDURE EVALUATION
PRE-OP EVALUATION    Patient Name: Eliazar Beltran    Pre-op Diagnosis: RECURRENT PLEURAL EFFUSION    Procedure(s):  INSERTION LEFT PLEURX CATHETER    Surgeon(s) and Role:     * Tony Taylor MD - Primary    Pre-op vitals reviewed.   Temp: 98.6 °F (37 °C mouth daily. Disp: 90 tablet Rfl: 3   ferrous sulfate 325 (65 FE) MG Oral Tab EC Take 1 tablet (325 mg total) by mouth 3 (three) times daily with meals.  Disp: 270 tablet Rfl: 3   omeprazole 20 MG Oral Capsule Delayed Release Take 1 capsule (20 mg total) by 05-05-15: Compared  to the prior study, there has been no significant interval change. 2/2/2017    ECG reviewed.         Unable to assess MET.    (+) hypertension   (+) hyperlipidemia  (+) CAD  (+) past MI  (+) CABG/stent         (+) dysrhythmias and atria >3 FB  TM distance: > 6 cm  Neck ROM: full Cardiovascular      Rhythm: regular  Rate: normal     Dental      Dental appliance(s): upper dentures       Pulmonary    Pulmonary exam normal.                 Other findings            ASA: 3   Plan: MAC and gene

## 2017-02-24 NOTE — ANESTHESIA POSTPROCEDURE EVALUATION
[address] [facility] Patient Status:  Surgery Admit   Age/Gender 68year old female MRN [id number]   [facility] SURGERY Attending John Hyman MD   Hosp Day # 0 PCP Donavan Brown MD       Anesthesia Post-op Note    Proc

## 2017-02-24 NOTE — CONSULTS
Cardiothoracic Surgery   Consult     Consult  2/24/17  Dr Rosana Kaur  Diagnosis: recurrent left pleural effusion  68year old s/p CABG 2/6/17 with recurrent left pleural effusion   PMH: [place] syndrome, a-fib, HTN  PSH: AA stent 2016, hysterectomy, pubovaginal

## 2017-02-24 NOTE — OPERATIVE REPORT
[facility]    PATIENT'S NAME: Alden Austin   ATTENDING PHYSICIAN: Sera Mueller M.D. OPERATING PHYSICIAN: Sera Mueller M.D.    PATIENT ACCOUNT#:   [de-identified]    LOCATION:  [address] [place] [address] 10  MEDICAL RECORD #:   [id number]       DATE prepped in the field due to her having staples that needed to be removed and actually patient had some serous drainage. All the staples were removed. Manipulation of the mid incision demonstrated no further fluid.   On palpation, it did not feel like ther

## 2017-03-02 ENCOUNTER — LAB REQUISITION (OUTPATIENT)
Dept: LAB | Facility: HOSPITAL | Age: 77
End: 2017-03-02
Attending: THORACIC SURGERY (CARDIOTHORACIC VASCULAR SURGERY)
Payer: MEDICARE

## 2017-03-02 ENCOUNTER — PRIOR ORIGINAL RECORDS (OUTPATIENT)
Dept: OTHER | Age: 77
End: 2017-03-02

## 2017-03-02 DIAGNOSIS — I25.10 ATHEROSCLEROTIC HEART DISEASE OF NATIVE CORONARY ARTERY WITHOUT ANGINA PECTORIS: ICD-10-CM

## 2017-03-02 LAB
BUN BLD-MCNC: 16 MG/DL (ref 8–20)
CALCIUM BLD-MCNC: 8.9 MG/DL (ref 8.3–10.3)
CD19+CD10+: <0.1 %
CD19+CD20+: 99.5 %
CD19+CD22+: 99.2 %
CD19+CD23+: 32.2 %
CD19+CD25+: 0.1 %
CD19+CD5+: 8.4 %
CD19+CD5+CD38+: 1.9 %
CD19+CD79B+: 66.9 %
CD19+FMC7+: 0.2 %
CD19+KAPPA+: 56 %
CD19+LAMBDA+: 42.1 %
CD3+CD2+: 100 %
CD3+CD4+: 91.4 %
CD3+CD4+CD8+: 0.2 %
CD3+CD5+: 99.9 %
CD3+CD7+: 97.8 %
CD3+CD8+: 8 %
CD4+:CD8+ RATIO: 11.4
CHLORIDE: 99 MMOL/L (ref 101–111)
CO2: 32 MMOL/L (ref 22–32)
CREAT BLD-MCNC: 1.63 MG/DL (ref 0.55–1.02)
GLUCOSE BLD-MCNC: 78 MG/DL (ref 70–99)
KAPPA:LAMBDA RATIO: 1.33
POTASSIUM SERPL-SCNC: 4.1 MMOL/L (ref 3.6–5.1)
SODIUM SERPL-SCNC: 139 MMOL/L (ref 136–144)

## 2017-03-02 PROCEDURE — 80048 BASIC METABOLIC PNL TOTAL CA: CPT | Performed by: THORACIC SURGERY (CARDIOTHORACIC VASCULAR SURGERY)

## 2017-03-03 ENCOUNTER — LAB REQUISITION (OUTPATIENT)
Dept: LAB | Facility: HOSPITAL | Age: 77
End: 2017-03-03
Attending: THORACIC SURGERY (CARDIOTHORACIC VASCULAR SURGERY)

## 2017-03-03 ENCOUNTER — PRIOR ORIGINAL RECORDS (OUTPATIENT)
Dept: OTHER | Age: 77
End: 2017-03-03

## 2017-03-03 DIAGNOSIS — I25.10 ATHEROSCLEROTIC HEART DISEASE OF NATIVE CORONARY ARTERY WITHOUT ANGINA PECTORIS: ICD-10-CM

## 2017-03-03 LAB
TOTAL PROTEIN PLEURAL FLUID: 3.6 G/DL
TRIGLYCERIDE PLEURAL FLUID: 1234 MG/DL

## 2017-03-03 PROCEDURE — 84478 ASSAY OF TRIGLYCERIDES: CPT | Performed by: THORACIC SURGERY (CARDIOTHORACIC VASCULAR SURGERY)

## 2017-03-03 PROCEDURE — 84157 ASSAY OF PROTEIN OTHER: CPT | Performed by: THORACIC SURGERY (CARDIOTHORACIC VASCULAR SURGERY)

## 2017-03-07 ENCOUNTER — TELEPHONE (OUTPATIENT)
Dept: CARDIOLOGY UNIT | Facility: HOSPITAL | Age: 77
End: 2017-03-07

## 2017-03-07 ENCOUNTER — APPOINTMENT (OUTPATIENT)
Dept: CARDIAC REHAB | Facility: HOSPITAL | Age: 77
End: 2017-03-07
Attending: INTERNAL MEDICINE
Payer: MEDICARE

## 2017-03-07 LAB
BUN: 16 MG/DL
CALCIUM: 8.9 MG/DL
CHLORIDE: 99 MEQ/L
CREATININE, SERUM: 1.63 MG/DL
GLUCOSE: 78 MG/DL
POTASSIUM, SERUM: 4.1 MEQ/L
SODIUM: 139 MEQ/L

## 2017-03-07 NOTE — PROGRESS NOTES
Per [facility] RN [phone]  Drainages:  3/4 425  3/5 350  3/6 400    Continues to be milky in color. Continue daily drainages, advised of low fat chyle diet.     Abner Landrum RN  Clinical Coordinator  [facility] Surgery

## 2017-03-09 ENCOUNTER — TELEPHONE (OUTPATIENT)
Dept: CARDIOLOGY UNIT | Facility: HOSPITAL | Age: 77
End: 2017-03-09

## 2017-03-09 NOTE — PROGRESS NOTES
[facility] RN Residential [phone]  Draianges:  3/4 425 Milky  3/5 350 Milky  3/6 405 Milky  3/7 410 Milky  3/8 510 Ramirez    Patient is adhering to a very low fat diet. Continue daily drainages and call Monday with amts. D/w QUINCY Riggs.     Hannah Vargas

## 2017-03-10 ENCOUNTER — OFFICE VISIT (OUTPATIENT)
Dept: INTERNAL MEDICINE CLINIC | Facility: CLINIC | Age: 77
End: 2017-03-10

## 2017-03-10 VITALS
OXYGEN SATURATION: 94 % | BODY MASS INDEX: 26 KG/M2 | HEART RATE: 78 BPM | DIASTOLIC BLOOD PRESSURE: 60 MMHG | SYSTOLIC BLOOD PRESSURE: 120 MMHG | TEMPERATURE: 97 F | WEIGHT: 143.81 LBS | RESPIRATION RATE: 16 BRPM

## 2017-03-10 DIAGNOSIS — I25.2 HISTORY OF MI (MYOCARDIAL INFARCTION): Primary | ICD-10-CM

## 2017-03-10 DIAGNOSIS — I10 ESSENTIAL HYPERTENSION: ICD-10-CM

## 2017-03-10 DIAGNOSIS — J90 RECURRENT PLEURAL EFFUSION ON LEFT: ICD-10-CM

## 2017-03-10 DIAGNOSIS — I50.32 CHRONIC DIASTOLIC HEART FAILURE (HCC): ICD-10-CM

## 2017-03-10 DIAGNOSIS — I48.0 PAF (PAROXYSMAL ATRIAL FIBRILLATION) (HCC): ICD-10-CM

## 2017-03-10 PROBLEM — D64.9 ANEMIA: Status: RESOLVED | Noted: 2017-02-02 | Resolved: 2017-03-10

## 2017-03-10 PROBLEM — Z95.1 S/P CABG (CORONARY ARTERY BYPASS GRAFT): Status: RESOLVED | Noted: 2017-02-08 | Resolved: 2017-03-10

## 2017-03-10 PROBLEM — R06.00 DYSPNEA, UNSPECIFIED TYPE: Status: RESOLVED | Noted: 2017-02-02 | Resolved: 2017-03-10

## 2017-03-10 PROBLEM — R73.9 HYPERGLYCEMIA: Status: RESOLVED | Noted: 2017-02-02 | Resolved: 2017-03-10

## 2017-03-10 PROBLEM — R07.9 ACUTE CHEST PAIN: Status: RESOLVED | Noted: 2017-02-02 | Resolved: 2017-03-10

## 2017-03-10 PROCEDURE — 99214 OFFICE O/P EST MOD 30 MIN: CPT | Performed by: INTERNAL MEDICINE

## 2017-03-10 RX ORDER — FUROSEMIDE 40 MG/1
TABLET ORAL
Qty: 90 TABLET | Refills: 3 | Status: SHIPPED | OUTPATIENT
Start: 2017-03-10 | End: 2017-05-25

## 2017-03-10 RX ORDER — WARFARIN SODIUM 2 MG/1
2 TABLET ORAL NIGHTLY
Qty: 90 TABLET | Refills: 3 | Status: SHIPPED | OUTPATIENT
Start: 2017-03-10 | End: 2017-05-31

## 2017-03-10 RX ORDER — FUROSEMIDE 40 MG/1
TABLET ORAL
Refills: 0 | COMMUNITY
Start: 2017-03-01 | End: 2017-03-10

## 2017-03-10 RX ORDER — HYDROCODONE BITARTRATE AND ACETAMINOPHEN 10; 325 MG/1; MG/1
TABLET ORAL
Refills: 0 | COMMUNITY
Start: 2017-03-01 | End: 2017-03-10

## 2017-03-10 RX ORDER — HYDROCODONE BITARTRATE AND ACETAMINOPHEN 10; 325 MG/1; MG/1
TABLET ORAL
Qty: 30 TABLET | Refills: 3 | Status: SHIPPED | OUTPATIENT
Start: 2017-03-10 | End: 2017-03-13

## 2017-03-10 NOTE — PROGRESS NOTES
Patient returns to [facility]. She was [place] at [facility]. Initially she went to [facility] with accelerated angina. She underwent bypass surgery. Patient does have recurrent left pleural effusion. She is to strain daily.   She does take a hydroc

## 2017-03-10 NOTE — PROCEDURES
[facility]    PATIENT'S NAME: Palak Campo   ATTENDING PHYSICIAN: Niraj Disla M.D. OPERATING PHYSICIAN: Magalis Carrillo M.D.    PATIENT ACCOUNT#:   [de-identified]    LOCATION:  [address] [place] [address]  MEDICAL RECORD #:   [id number]       DATE OF BIRTH:

## 2017-03-13 DIAGNOSIS — J90 RECURRENT PLEURAL EFFUSION ON LEFT: Primary | ICD-10-CM

## 2017-03-13 RX ORDER — HYDROCODONE BITARTRATE AND ACETAMINOPHEN 10; 325 MG/1; MG/1
TABLET ORAL
Qty: 30 TABLET | Refills: 0 | Status: SHIPPED | OUTPATIENT
Start: 2017-03-13 | End: 2017-05-25

## 2017-03-15 ENCOUNTER — LAB REQUISITION (OUTPATIENT)
Dept: LAB | Facility: HOSPITAL | Age: 77
End: 2017-03-15
Attending: THORACIC SURGERY (CARDIOTHORACIC VASCULAR SURGERY)
Payer: MEDICARE

## 2017-03-15 DIAGNOSIS — Z98.890 OTHER SPECIFIED POSTPROCEDURAL STATES: ICD-10-CM

## 2017-03-15 LAB — TRIGLYCERIDE PLEURAL FLUID: 74 MG/DL

## 2017-03-15 PROCEDURE — 84478 ASSAY OF TRIGLYCERIDES: CPT | Performed by: THORACIC SURGERY (CARDIOTHORACIC VASCULAR SURGERY)

## 2017-03-21 ENCOUNTER — PRIOR ORIGINAL RECORDS (OUTPATIENT)
Dept: OTHER | Age: 77
End: 2017-03-21

## 2017-03-24 ENCOUNTER — HOSPITAL ENCOUNTER (OUTPATIENT)
Dept: LAB | Facility: HOSPITAL | Age: 77
Discharge: HOME | End: 2017-03-24
Attending: INTERNAL MEDICINE
Payer: MEDICARE

## 2017-03-24 ENCOUNTER — PRIOR ORIGINAL RECORDS (OUTPATIENT)
Dept: OTHER | Age: 77
End: 2017-03-24

## 2017-03-24 LAB
BUN BLD-MCNC: 14 MG/DL (ref 8–20)
CALCIUM BLD-MCNC: 9.3 MG/DL (ref 8.3–10.3)
CHLORIDE: 102 MMOL/L (ref 101–111)
CO2: 30 MMOL/L (ref 22–32)
CREAT BLD-MCNC: 1.5 MG/DL (ref 0.55–1.02)
GLUCOSE BLD-MCNC: 111 MG/DL (ref 70–99)
POTASSIUM SERPL-SCNC: 3.9 MMOL/L (ref 3.6–5.1)
SODIUM SERPL-SCNC: 139 MMOL/L (ref 136–144)

## 2017-03-24 PROCEDURE — 80048 BASIC METABOLIC PNL TOTAL CA: CPT | Performed by: INTERNAL MEDICINE

## 2017-03-24 PROCEDURE — 36415 COLL VENOUS BLD VENIPUNCTURE: CPT | Performed by: INTERNAL MEDICINE

## 2017-03-28 LAB
BUN: 14 MG/DL
CALCIUM: 9.3 MG/DL
CHLORIDE: 102 MEQ/L
CREATININE, SERUM: 1.5 MG/DL
GLUCOSE: 111 MG/DL
POTASSIUM, SERUM: 3.9 MEQ/L
SODIUM: 139 MEQ/L

## 2017-03-28 RX ORDER — AMIODARONE HYDROCHLORIDE 200 MG/1
200 TABLET ORAL DAILY
Qty: 90 TABLET | Refills: 3 | Status: SHIPPED | OUTPATIENT
Start: 2017-03-28 | End: 2017-05-31

## 2017-03-28 RX ORDER — SENNOSIDES 8.6 MG
17.2 TABLET ORAL DAILY
Qty: 180 TABLET | Refills: 3 | Status: SHIPPED | OUTPATIENT
Start: 2017-03-28 | End: 2019-01-18

## 2017-03-28 RX ORDER — LEVOTHYROXINE SODIUM 0.07 MG/1
75 TABLET ORAL
Qty: 90 TABLET | Refills: 3 | Status: SHIPPED | OUTPATIENT
Start: 2017-03-28 | End: 2017-04-14

## 2017-04-03 RX ORDER — MELATONIN
325
Qty: 270 TABLET | Refills: 0 | Status: SHIPPED | OUTPATIENT
Start: 2017-04-03 | End: 2017-07-11

## 2017-04-03 RX ORDER — OMEPRAZOLE 20 MG/1
20 CAPSULE, DELAYED RELEASE ORAL DAILY
Qty: 90 CAPSULE | Refills: 3 | Status: SHIPPED | OUTPATIENT
Start: 2017-04-03 | End: 2019-07-25

## 2017-04-14 ENCOUNTER — PRIOR ORIGINAL RECORDS (OUTPATIENT)
Dept: OTHER | Age: 77
End: 2017-04-14

## 2017-04-14 ENCOUNTER — OFFICE VISIT (OUTPATIENT)
Dept: INTERNAL MEDICINE CLINIC | Facility: CLINIC | Age: 77
End: 2017-04-14

## 2017-04-14 VITALS
BODY MASS INDEX: 26 KG/M2 | SYSTOLIC BLOOD PRESSURE: 120 MMHG | DIASTOLIC BLOOD PRESSURE: 70 MMHG | OXYGEN SATURATION: 96 % | WEIGHT: 141.31 LBS | TEMPERATURE: 98 F | RESPIRATION RATE: 16 BRPM | HEART RATE: 76 BPM

## 2017-04-14 DIAGNOSIS — J90 PLEURAL EFFUSION: Primary | ICD-10-CM

## 2017-04-14 DIAGNOSIS — R73.01 IFG (IMPAIRED FASTING GLUCOSE): ICD-10-CM

## 2017-04-14 DIAGNOSIS — I10 ESSENTIAL HYPERTENSION: ICD-10-CM

## 2017-04-14 DIAGNOSIS — I48.20 CHRONIC ATRIAL FIBRILLATION (HCC): ICD-10-CM

## 2017-04-14 DIAGNOSIS — E03.9 ACQUIRED HYPOTHYROIDISM: ICD-10-CM

## 2017-04-14 DIAGNOSIS — D50.9 IRON DEFICIENCY ANEMIA, UNSPECIFIED IRON DEFICIENCY ANEMIA TYPE: ICD-10-CM

## 2017-04-14 DIAGNOSIS — E78.5 DYSLIPIDEMIA: ICD-10-CM

## 2017-04-14 DIAGNOSIS — I50.32 CHRONIC DIASTOLIC HEART FAILURE (HCC): ICD-10-CM

## 2017-04-14 PROCEDURE — 84443 ASSAY THYROID STIM HORMONE: CPT | Performed by: INTERNAL MEDICINE

## 2017-04-14 PROCEDURE — 85025 COMPLETE CBC W/AUTO DIFF WBC: CPT | Performed by: INTERNAL MEDICINE

## 2017-04-14 PROCEDURE — 36415 COLL VENOUS BLD VENIPUNCTURE: CPT | Performed by: INTERNAL MEDICINE

## 2017-04-14 PROCEDURE — 99214 OFFICE O/P EST MOD 30 MIN: CPT | Performed by: INTERNAL MEDICINE

## 2017-04-14 RX ORDER — LEVOTHYROXINE SODIUM 0.07 MG/1
75 TABLET ORAL
Qty: 90 TABLET | Refills: 3 | Status: SHIPPED | OUTPATIENT
Start: 2017-04-14 | End: 2017-04-21

## 2017-04-14 NOTE — PROGRESS NOTES
Problem #1 left pleural effusion still has a drain. Gets drained every other day. Yesterday drained 130 cc.   Problem #2 chronic diastolic congestive heart failure clinically stable per cardiology problem #3 chronic A. fib on blood thinners problem #4 hyp

## 2017-04-20 ENCOUNTER — APPOINTMENT (OUTPATIENT)
Dept: CARDIAC REHAB | Facility: HOSPITAL | Age: 77
End: 2017-04-20
Attending: INTERNAL MEDICINE
Payer: MEDICARE

## 2017-04-21 RX ORDER — LEVOTHYROXINE SODIUM 0.07 MG/1
75 TABLET ORAL
Qty: 90 TABLET | Refills: 3 | Status: SHIPPED | OUTPATIENT
Start: 2017-04-21 | End: 2019-01-18

## 2017-05-03 ENCOUNTER — TELEPHONE (OUTPATIENT)
Dept: CARDIOLOGY UNIT | Facility: HOSPITAL | Age: 77
End: 2017-05-03

## 2017-05-03 NOTE — PROGRESS NOTES
D/w Jayde Mathis, INR 1.8 today, pleurex removal on Friday. D/w pt, hold coumadin today and tomorrow, she is in agreement. She will have to take a cab to and from [facility] will see on Friday afternoon for INR check.  Discussed procedure,

## 2017-05-05 ENCOUNTER — PRIOR ORIGINAL RECORDS (OUTPATIENT)
Dept: OTHER | Age: 77
End: 2017-05-05

## 2017-05-05 ENCOUNTER — HOSPITAL ENCOUNTER (OUTPATIENT)
Facility: HOSPITAL | Age: 77
Setting detail: HOSPITAL OUTPATIENT SURGERY
Discharge: HOME | End: 2017-05-05
Attending: THORACIC SURGERY (CARDIOTHORACIC VASCULAR SURGERY) | Admitting: THORACIC SURGERY (CARDIOTHORACIC VASCULAR SURGERY)
Payer: MEDICARE

## 2017-05-05 ENCOUNTER — SURGERY (OUTPATIENT)
Age: 77
End: 2017-05-05

## 2017-05-05 ENCOUNTER — APPOINTMENT (OUTPATIENT)
Dept: GENERAL RADIOLOGY | Facility: HOSPITAL | Age: 77
End: 2017-05-05
Attending: THORACIC SURGERY (CARDIOTHORACIC VASCULAR SURGERY)
Payer: MEDICARE

## 2017-05-05 ENCOUNTER — ANESTHESIA (OUTPATIENT)
Dept: CARDIAC SURGERY | Facility: HOSPITAL | Age: 77
End: 2017-05-05
Payer: MEDICARE

## 2017-05-05 ENCOUNTER — ANESTHESIA EVENT (OUTPATIENT)
Dept: CARDIAC SURGERY | Facility: HOSPITAL | Age: 77
End: 2017-05-05
Payer: MEDICARE

## 2017-05-05 VITALS
HEIGHT: 61 IN | OXYGEN SATURATION: 94 % | SYSTOLIC BLOOD PRESSURE: 142 MMHG | RESPIRATION RATE: 18 BRPM | WEIGHT: 142.13 LBS | HEART RATE: 71 BPM | TEMPERATURE: 98 F | DIASTOLIC BLOOD PRESSURE: 66 MMHG | BODY MASS INDEX: 26.83 KG/M2

## 2017-05-05 PROCEDURE — 80053 COMPREHEN METABOLIC PANEL: CPT | Performed by: THORACIC SURGERY (CARDIOTHORACIC VASCULAR SURGERY)

## 2017-05-05 PROCEDURE — 71010 XR CHEST AP PORTABLE  (CPT=71010): CPT | Performed by: THORACIC SURGERY (CARDIOTHORACIC VASCULAR SURGERY)

## 2017-05-05 PROCEDURE — 85610 PROTHROMBIN TIME: CPT | Performed by: THORACIC SURGERY (CARDIOTHORACIC VASCULAR SURGERY)

## 2017-05-05 PROCEDURE — 0WPBX3Z REMOVAL OF INFUSION DEVICE FROM LEFT PLEURAL CAVITY, EXTERNAL APPROACH: ICD-10-PCS | Performed by: THORACIC SURGERY (CARDIOTHORACIC VASCULAR SURGERY)

## 2017-05-05 RX ORDER — HYDROMORPHONE HYDROCHLORIDE 1 MG/ML
0.4 INJECTION, SOLUTION INTRAMUSCULAR; INTRAVENOUS; SUBCUTANEOUS EVERY 5 MIN PRN
Status: ACTIVE | OUTPATIENT
Start: 2017-05-05 | End: 2017-05-05

## 2017-05-05 RX ORDER — SODIUM CHLORIDE, SODIUM LACTATE, POTASSIUM CHLORIDE, CALCIUM CHLORIDE 600; 310; 30; 20 MG/100ML; MG/100ML; MG/100ML; MG/100ML
INJECTION, SOLUTION INTRAVENOUS CONTINUOUS
Status: DISCONTINUED | OUTPATIENT
Start: 2017-05-05 | End: 2017-05-08

## 2017-05-05 RX ORDER — HYDROCODONE BITARTRATE AND ACETAMINOPHEN 5; 325 MG/1; MG/1
2 TABLET ORAL AS NEEDED
Status: DISCONTINUED | OUTPATIENT
Start: 2017-05-05 | End: 2017-05-08

## 2017-05-05 RX ORDER — METOCLOPRAMIDE HYDROCHLORIDE 5 MG/ML
10 INJECTION INTRAMUSCULAR; INTRAVENOUS AS NEEDED
Status: ACTIVE | OUTPATIENT
Start: 2017-05-05 | End: 2017-05-05

## 2017-05-05 RX ORDER — NALOXONE HYDROCHLORIDE 0.4 MG/ML
80 INJECTION, SOLUTION INTRAMUSCULAR; INTRAVENOUS; SUBCUTANEOUS AS NEEDED
Status: DISPENSED | OUTPATIENT
Start: 2017-05-05 | End: 2017-05-05

## 2017-05-05 RX ORDER — HYDROCODONE BITARTRATE AND ACETAMINOPHEN 5; 325 MG/1; MG/1
1 TABLET ORAL AS NEEDED
Status: DISCONTINUED | OUTPATIENT
Start: 2017-05-05 | End: 2017-05-08

## 2017-05-05 RX ORDER — ONDANSETRON 2 MG/ML
4 INJECTION INTRAMUSCULAR; INTRAVENOUS AS NEEDED
Status: ACTIVE | OUTPATIENT
Start: 2017-05-05 | End: 2017-05-05

## 2017-05-05 RX ORDER — ACETAMINOPHEN 500 MG
1000 TABLET ORAL ONCE AS NEEDED
Status: ACTIVE | OUTPATIENT
Start: 2017-05-05 | End: 2017-05-05

## 2017-05-05 RX ORDER — LIDOCAINE HYDROCHLORIDE 10 MG/ML
INJECTION, SOLUTION EPIDURAL; INFILTRATION; INTRACAUDAL; PERINEURAL AS NEEDED
Status: DISCONTINUED | OUTPATIENT
Start: 2017-05-05 | End: 2017-05-08

## 2017-05-05 NOTE — ANESTHESIA PREPROCEDURE EVALUATION
PRE-OP EVALUATION    Patient Name: Zenon Macias    Pre-op Diagnosis: pleurx catheter    Procedure(s):  removal of left pleurx catheter    Surgeon(s) and Role:     * Zak Merino MD - Primary    Pre-op vitals reviewed.   Temp: 97.7 °F (36.5 °C)  Pulse HYDROcodone-acetaminophen (NORCO) 5-325 MG Oral Tab Take 1-2 tablets by mouth every 4 to 6 hours as needed for Pain. Disp: 60 tablet Rfl: 0   Alendronate Sodium 70 MG Oral Tab Take 1 tablet (70 mg total) by mouth once a week.  Disp: 12 tablet Rfl: 3   Govind ESOPHAGOGASTRODUODENOSCOPY (EGD);   Surgeon: Ashely Andino MD;  Location: [facility] ENDOSCOPY    CABG  2/6/17        Smoking status: Former Smoker  1.00 Packs/Day  For 30.00 Years     Types: Cigarettes    Quit date: 06/01/1995    Smokeless tobacco: Never U

## 2017-05-05 NOTE — OPERATIVE REPORT
[facility] Op Note    Pre-op: left pleural effusion    Post-op: same    Procedure:  Removal of left pleurx catheter    Surgeon: Tori Yang    Findings: removal of intact pleurx catheter    EBL: none    Job: [id number]

## 2017-05-05 NOTE — PROGRESS NOTES
Pt discharged home via wheelchair by volunteer by joel Patel. Pt left pleurx catheter removed today, dressing cdi, no bleeding or oozing. Pt left with belongings and discharge instructions. Pt with no complaints.

## 2017-05-05 NOTE — ANESTHESIA POSTPROCEDURE EVALUATION
[address] [facility] Patient Status:  Inpatient   Age/Gender 68year old female MRN [id number]   Location [address] [facility] Attending Hui Keller MD   Hosp Day # 0 PCP Selvin Felton MD       Anesthesia Post-op N

## 2017-05-06 NOTE — OPERATIVE REPORT
[facility]    PATIENT'S NAME: Erwin Mitesh   ATTENDING PHYSICIAN: Sindy Melendez M.D. OPERATING PHYSICIAN: Sindy Melendez M.D.    PATIENT ACCOUNT#:   [de-identified]    LOCATION:  [facility] #:   [id number]       DATE OF BIRTH

## 2017-05-08 NOTE — DISCHARGE SUMMARY
[facility]    PATIENT'S NAME: Donovan Markham   ATTENDING PHYSICIAN: Jasper Corbett M.D.    PATIENT ACCOUNT#:   [de-identified]    LOCATION:  [address] #:   [id number]       YOB: 1940  ADMISSION DATE:       05/0

## 2017-05-08 NOTE — H&P
Cardiothoracic Surgery  Consult     Consult  5/5/17  Referring: Dr Manny Manriquez  Diagnosis: recurrent left pleural effusion  68year old s/p CABG 2/6/17 with recurrent left pleural effusion, pleurx placed  Now with decreased pleurx outputs    PMH: [place] syndrome

## 2017-05-22 ENCOUNTER — PRIOR ORIGINAL RECORDS (OUTPATIENT)
Dept: OTHER | Age: 77
End: 2017-05-22

## 2017-05-23 ENCOUNTER — HOSPITAL ENCOUNTER (OUTPATIENT)
Dept: CV DIAGNOSTICS | Facility: HOSPITAL | Age: 77
Discharge: HOME | End: 2017-05-23
Attending: INTERNAL MEDICINE
Payer: MEDICARE

## 2017-05-23 DIAGNOSIS — Z95.1 S/P CABG (CORONARY ARTERY BYPASS GRAFT): ICD-10-CM

## 2017-05-23 PROCEDURE — 93226 XTRNL ECG REC<48 HR SCAN A/R: CPT | Performed by: INTERNAL MEDICINE

## 2017-05-23 PROCEDURE — 93225 XTRNL ECG REC<48 HRS REC: CPT | Performed by: INTERNAL MEDICINE

## 2017-05-23 PROCEDURE — 93227 XTRNL ECG REC<48 HR R&I: CPT | Performed by: INTERNAL MEDICINE

## 2017-05-25 ENCOUNTER — CARDPULM VISIT (OUTPATIENT)
Dept: CARDIAC REHAB | Facility: HOSPITAL | Age: 77
End: 2017-05-25
Attending: INTERNAL MEDICINE
Payer: MEDICARE

## 2017-05-25 VITALS
DIASTOLIC BLOOD PRESSURE: 80 MMHG | HEIGHT: 62 IN | BODY MASS INDEX: 26.25 KG/M2 | SYSTOLIC BLOOD PRESSURE: 120 MMHG | WEIGHT: 142.63 LBS | HEART RATE: 83 BPM | OXYGEN SATURATION: 97 %

## 2017-05-25 DIAGNOSIS — Z95.1 S/P CABG (CORONARY ARTERY BYPASS GRAFT): Primary | ICD-10-CM

## 2017-05-25 LAB
ALBUMIN: 3.3 G/DL
ALKALINE PHOSPHATATE(ALK PHOS): 108 IU/L
BILIRUBIN TOTAL: 0.6 MG/DL
BUN: 15 MG/DL
CALCIUM: 9.1 MG/DL
CHLORIDE: 103 MEQ/L
CREATININE, SERUM: 1.52 MG/DL
GLUCOSE: 112 MG/DL
HEMATOCRIT: 35.5 %
HEMOGLOBIN: 10.8 G/DL
PLATELETS: 310 K/UL
POTASSIUM, SERUM: 3.6 MEQ/L
PROTEIN, TOTAL: 7.7 G/DL
RED BLOOD COUNT: 3.85 X 10-6/U
SGOT (AST): 15 IU/L
SGPT (ALT): 20 IU/L
SODIUM: 138 MEQ/L
THYROID STIMULATING HORMONE: 0.47 MLU/L
WHITE BLOOD COUNT: 8.3 X 10-3/U

## 2017-05-25 PROCEDURE — 93798 PHYS/QHP OP CAR RHAB W/ECG: CPT

## 2017-05-26 ENCOUNTER — HOSPITAL ENCOUNTER (OUTPATIENT)
Dept: LAB | Facility: HOSPITAL | Age: 77
Discharge: HOME | End: 2017-05-26
Attending: CLINICAL NURSE SPECIALIST
Payer: MEDICARE

## 2017-05-26 DIAGNOSIS — I48.0 PAROXYSMAL ATRIAL FIBRILLATION (HCC): ICD-10-CM

## 2017-05-26 PROCEDURE — 85610 PROTHROMBIN TIME: CPT

## 2017-05-30 ENCOUNTER — PRIOR ORIGINAL RECORDS (OUTPATIENT)
Dept: OTHER | Age: 77
End: 2017-05-30

## 2017-05-30 RX ORDER — LORAZEPAM 1 MG/1
1 TABLET ORAL NIGHTLY PRN
Qty: 90 TABLET | Refills: 3 | Status: SHIPPED | OUTPATIENT
Start: 2017-05-30 | End: 2019-01-18

## 2017-05-31 ENCOUNTER — CARDPULM VISIT (OUTPATIENT)
Dept: CARDIAC REHAB | Facility: HOSPITAL | Age: 77
End: 2017-05-31
Attending: INTERNAL MEDICINE
Payer: MEDICARE

## 2017-05-31 PROCEDURE — 93798 PHYS/QHP OP CAR RHAB W/ECG: CPT

## 2017-06-02 ENCOUNTER — APPOINTMENT (OUTPATIENT)
Dept: CARDIAC REHAB | Facility: HOSPITAL | Age: 77
End: 2017-06-02
Attending: INTERNAL MEDICINE
Payer: MEDICARE

## 2017-06-02 PROCEDURE — 93798 PHYS/QHP OP CAR RHAB W/ECG: CPT

## 2017-06-05 ENCOUNTER — CARDPULM VISIT (OUTPATIENT)
Dept: CARDIAC REHAB | Facility: HOSPITAL | Age: 77
End: 2017-06-05
Attending: INTERNAL MEDICINE
Payer: MEDICARE

## 2017-06-05 PROCEDURE — 93798 PHYS/QHP OP CAR RHAB W/ECG: CPT

## 2017-06-07 ENCOUNTER — APPOINTMENT (OUTPATIENT)
Dept: CARDIAC REHAB | Facility: HOSPITAL | Age: 77
End: 2017-06-07
Attending: INTERNAL MEDICINE
Payer: MEDICARE

## 2017-06-07 PROCEDURE — 93798 PHYS/QHP OP CAR RHAB W/ECG: CPT

## 2017-06-09 ENCOUNTER — PRIOR ORIGINAL RECORDS (OUTPATIENT)
Dept: OTHER | Age: 77
End: 2017-06-09

## 2017-06-09 ENCOUNTER — CARDPULM VISIT (OUTPATIENT)
Dept: CARDIAC REHAB | Facility: HOSPITAL | Age: 77
End: 2017-06-09
Attending: INTERNAL MEDICINE
Payer: MEDICARE

## 2017-06-09 PROCEDURE — 93798 PHYS/QHP OP CAR RHAB W/ECG: CPT

## 2017-06-12 ENCOUNTER — CARDPULM VISIT (OUTPATIENT)
Dept: CARDIAC REHAB | Facility: HOSPITAL | Age: 77
End: 2017-06-12
Attending: INTERNAL MEDICINE
Payer: MEDICARE

## 2017-06-12 PROCEDURE — 93798 PHYS/QHP OP CAR RHAB W/ECG: CPT

## 2017-06-14 ENCOUNTER — APPOINTMENT (OUTPATIENT)
Dept: CARDIAC REHAB | Facility: HOSPITAL | Age: 77
End: 2017-06-14
Attending: INTERNAL MEDICINE
Payer: MEDICARE

## 2017-06-15 ENCOUNTER — OFFICE VISIT (OUTPATIENT)
Dept: INTERNAL MEDICINE CLINIC | Facility: CLINIC | Age: 77
End: 2017-06-15

## 2017-06-15 VITALS
TEMPERATURE: 97 F | SYSTOLIC BLOOD PRESSURE: 124 MMHG | OXYGEN SATURATION: 93 % | RESPIRATION RATE: 18 BRPM | WEIGHT: 147.31 LBS | HEIGHT: 61 IN | BODY MASS INDEX: 27.81 KG/M2 | DIASTOLIC BLOOD PRESSURE: 60 MMHG | HEART RATE: 70 BPM

## 2017-06-15 DIAGNOSIS — N39.45 CONTINUOUS LEAKAGE OF URINE: ICD-10-CM

## 2017-06-15 DIAGNOSIS — I87.2 VENOUS INSUFFICIENCY: ICD-10-CM

## 2017-06-15 DIAGNOSIS — I50.32 CHRONIC DIASTOLIC HEART FAILURE (HCC): ICD-10-CM

## 2017-06-15 DIAGNOSIS — D50.9 IRON DEFICIENCY ANEMIA, UNSPECIFIED IRON DEFICIENCY ANEMIA TYPE: ICD-10-CM

## 2017-06-15 DIAGNOSIS — J90 RECURRENT PLEURAL EFFUSION ON LEFT: ICD-10-CM

## 2017-06-15 DIAGNOSIS — I48.20 CHRONIC ATRIAL FIBRILLATION (HCC): Primary | ICD-10-CM

## 2017-06-15 DIAGNOSIS — G35 MS (MULTIPLE SCLEROSIS) (HCC): ICD-10-CM

## 2017-06-15 DIAGNOSIS — E03.9 ACQUIRED HYPOTHYROIDISM: ICD-10-CM

## 2017-06-15 DIAGNOSIS — Z98.890 HISTORY OF AAA (ABDOMINAL AORTIC ANEURYSM) REPAIR: ICD-10-CM

## 2017-06-15 DIAGNOSIS — E78.5 DYSLIPIDEMIA: ICD-10-CM

## 2017-06-15 DIAGNOSIS — I10 ESSENTIAL HYPERTENSION: ICD-10-CM

## 2017-06-15 DIAGNOSIS — Z00.00 ENCOUNTER FOR ANNUAL HEALTH EXAMINATION: ICD-10-CM

## 2017-06-15 DIAGNOSIS — R73.01 IFG (IMPAIRED FASTING GLUCOSE): ICD-10-CM

## 2017-06-15 DIAGNOSIS — F51.01 PRIMARY INSOMNIA: ICD-10-CM

## 2017-06-15 PROCEDURE — 84443 ASSAY THYROID STIM HORMONE: CPT | Performed by: INTERNAL MEDICINE

## 2017-06-15 PROCEDURE — 83036 HEMOGLOBIN GLYCOSYLATED A1C: CPT | Performed by: INTERNAL MEDICINE

## 2017-06-15 PROCEDURE — 85025 COMPLETE CBC W/AUTO DIFF WBC: CPT | Performed by: INTERNAL MEDICINE

## 2017-06-15 PROCEDURE — 80053 COMPREHEN METABOLIC PANEL: CPT | Performed by: INTERNAL MEDICINE

## 2017-06-15 PROCEDURE — 36415 COLL VENOUS BLD VENIPUNCTURE: CPT | Performed by: INTERNAL MEDICINE

## 2017-06-15 PROCEDURE — 80061 LIPID PANEL: CPT | Performed by: INTERNAL MEDICINE

## 2017-06-15 PROCEDURE — G0439 PPPS, SUBSEQ VISIT: HCPCS | Performed by: INTERNAL MEDICINE

## 2017-06-15 RX ORDER — CALCIUM CARBONATE/VITAMIN D3 600 MG-10
TABLET ORAL
Refills: 3 | COMMUNITY
Start: 2017-06-06 | End: 2017-06-15

## 2017-06-15 RX ORDER — FUROSEMIDE 40 MG/1
40 TABLET ORAL DAILY
COMMUNITY
End: 2019-01-18

## 2017-06-15 NOTE — PROGRESS NOTES
Problem 1 chronic A. fib clinically stable problem #2 chronic diastolic congestive heart failure clinically stable monitored by cardiology problem #3 multiple sclerosis in remission long-term.   Problem #4 dyslipidemia check lipid panel problem #5 hypertens TSH 0.471 04/14/2017   CREATSERUM 1.52* 05/05/2017   * 05/05/2017        CBC  (most recent labs)     Lab Results  Component Value Date   WBC 8.3 04/14/2017   HGB 10.8* 04/14/2017   .0 04/14/2017        ALLERGIES:   She has No Known Allergie mammogram; History of pubovaginal sling (2007); Osteoarthritis; History of blood transfusion; Arrhythmia; A-fib ([facility] [address] GI bleed; Coronary atherosclerosis; High blood pressure; High cholesterol; Shortness of breath; Disorder of thyroid; Blood disorder;  Anem 18  Ht 61\"  Wt 147 lb 4.8 oz  BMI 27.85 kg/m2  SpO2 93% Estimated body mass index is 27.85 kg/(m^2) as calculated from the following:    Height as of this encounter: 61\". Weight as of this encounter: 147 lb 4.8 oz.     Medicare Hearing Assessment  (Req SUMMARY:   Diagnoses and all orders for this visit:    Chronic atrial fibrillation ([facility] [address]    Chronic diastolic heart failure (HCC)Dr Vinicio Lowe    MS (multiple sclerosis) ([address] [facility]    Dyslipidemia  -     Lipid Panel [E];  Future    Essential hypertension  -     CBC office such as Influenza, Hepatitis B, Tetanus, or Pneumococcal?: No     Functional Ability     Bathing or Showering: Able without help    Toileting: Able without help    Dressing: Able without help    Eating: Able without help    Driving:  (does not drive provided for quick review of chart, separate sheet to patient  [address] Internal Lab or Procedure External Lab or Procedure   Diabetes Screening      HbgA1C   Annually   Lab Results  Component Value Date   A1C 5.7* 02 Once after 65 09/15/2016 Please get once after your 65th birthday    Pneumococcal 23 (Pneumovax)  Covered Once after 65 No vaccine history found Please get once after your 65th birthday    Hepatitis B for Moderate/High Risk No vaccine history found Medium/ Annually Spirometry date:  No flowsheet data found.          Template: JOANNA PRADO MEDICARE ANNUAL ASSESSMENT FEMALE [[id number]]

## 2017-06-15 NOTE — PATIENT INSTRUCTIONS
Ba [facility]'s SCREENING SCHEDULE   Tests on this list are recommended by your physician but may not be covered, or covered at this frequency, by your insurer. Please check with your insurance carrier before scheduling to verify coverage.    PREVENTAT 100 cigarettes in their lifetime   • Anyone with a family history    Colorectal Cancer Screening  Covered up to Age 76     Colonoscopy Screen   Covered every 10 years- more often if abnormal Colonoscopy,10 Years due on 06/16/2020 Update [facility] ANTIG   Orders placed or performed in visit on 10/12/15  -FLU VACC PRSV [facility] INC ANTIG   Orders placed or performed in visit on 09/15/14  -FLU VACC PRSV FREE INC ANTIG   -ADMIN INFLUENZA VIRUS VAC    Please get every year    Pneumococcal 13 (Prevnar) information from the [address] [place] [address] regarding Advance Directives.

## 2017-06-16 ENCOUNTER — CARDPULM VISIT (OUTPATIENT)
Dept: CARDIAC REHAB | Facility: HOSPITAL | Age: 77
End: 2017-06-16
Attending: INTERNAL MEDICINE
Payer: MEDICARE

## 2017-06-16 ENCOUNTER — PRIOR ORIGINAL RECORDS (OUTPATIENT)
Dept: OTHER | Age: 77
End: 2017-06-16

## 2017-06-16 PROCEDURE — 93798 PHYS/QHP OP CAR RHAB W/ECG: CPT

## 2017-06-19 ENCOUNTER — PRIOR ORIGINAL RECORDS (OUTPATIENT)
Dept: OTHER | Age: 77
End: 2017-06-19

## 2017-06-19 ENCOUNTER — HOSPITAL ENCOUNTER (OUTPATIENT)
Dept: GENERAL RADIOLOGY | Facility: HOSPITAL | Age: 77
Discharge: HOME | End: 2017-06-19
Attending: INTERNAL MEDICINE
Payer: MEDICARE

## 2017-06-19 ENCOUNTER — TELEPHONE (OUTPATIENT)
Dept: INTERNAL MEDICINE CLINIC | Facility: CLINIC | Age: 77
End: 2017-06-19

## 2017-06-19 ENCOUNTER — APPOINTMENT (OUTPATIENT)
Dept: CARDIAC REHAB | Facility: HOSPITAL | Age: 77
End: 2017-06-19
Attending: INTERNAL MEDICINE
Payer: MEDICARE

## 2017-06-19 DIAGNOSIS — R06.02 SOB (SHORTNESS OF BREATH): Primary | ICD-10-CM

## 2017-06-19 DIAGNOSIS — R06.02 SOB (SHORTNESS OF BREATH): ICD-10-CM

## 2017-06-19 PROCEDURE — 71020 XR CHEST PA + LAT CHEST (CPT=71020): CPT | Performed by: INTERNAL MEDICINE

## 2017-06-19 PROCEDURE — 71035 XR CHEST DECUBITUS (CPT=71035): CPT | Performed by: INTERNAL MEDICINE

## 2017-06-19 PROCEDURE — 93798 PHYS/QHP OP CAR RHAB W/ECG: CPT

## 2017-06-19 NOTE — TELEPHONE ENCOUNTER
I left a message with Yared Barrios stating that cardiology wants her to see a pulmonologist to evaluate her shortness of breath.   Did leave the referral in her cubby

## 2017-06-21 ENCOUNTER — APPOINTMENT (OUTPATIENT)
Dept: CARDIAC REHAB | Facility: HOSPITAL | Age: 77
End: 2017-06-21
Attending: INTERNAL MEDICINE
Payer: MEDICARE

## 2017-06-21 PROCEDURE — 93798 PHYS/QHP OP CAR RHAB W/ECG: CPT

## 2017-06-23 ENCOUNTER — CARDPULM VISIT (OUTPATIENT)
Dept: CARDIAC REHAB | Facility: HOSPITAL | Age: 77
End: 2017-06-23
Attending: INTERNAL MEDICINE
Payer: MEDICARE

## 2017-06-23 PROCEDURE — 93798 PHYS/QHP OP CAR RHAB W/ECG: CPT

## 2017-06-26 ENCOUNTER — CARDPULM VISIT (OUTPATIENT)
Dept: CARDIAC REHAB | Facility: HOSPITAL | Age: 77
End: 2017-06-26
Attending: INTERNAL MEDICINE
Payer: MEDICARE

## 2017-06-26 PROCEDURE — 93798 PHYS/QHP OP CAR RHAB W/ECG: CPT

## 2017-06-28 ENCOUNTER — CARDPULM VISIT (OUTPATIENT)
Dept: CARDIAC REHAB | Facility: HOSPITAL | Age: 77
End: 2017-06-28
Attending: INTERNAL MEDICINE
Payer: MEDICARE

## 2017-06-28 PROCEDURE — 93798 PHYS/QHP OP CAR RHAB W/ECG: CPT

## 2017-06-30 ENCOUNTER — CARDPULM VISIT (OUTPATIENT)
Dept: CARDIAC REHAB | Facility: HOSPITAL | Age: 77
End: 2017-06-30
Attending: INTERNAL MEDICINE
Payer: MEDICARE

## 2017-06-30 PROCEDURE — 93798 PHYS/QHP OP CAR RHAB W/ECG: CPT

## 2017-07-03 ENCOUNTER — CARDPULM VISIT (OUTPATIENT)
Dept: CARDIAC REHAB | Facility: HOSPITAL | Age: 77
End: 2017-07-03
Attending: INTERNAL MEDICINE
Payer: MEDICARE

## 2017-07-05 ENCOUNTER — CARDPULM VISIT (OUTPATIENT)
Dept: CARDIAC REHAB | Facility: HOSPITAL | Age: 77
End: 2017-07-05
Attending: INTERNAL MEDICINE
Payer: MEDICARE

## 2017-07-05 PROCEDURE — 93798 PHYS/QHP OP CAR RHAB W/ECG: CPT

## 2017-07-07 ENCOUNTER — CARDPULM VISIT (OUTPATIENT)
Dept: CARDIAC REHAB | Facility: HOSPITAL | Age: 77
End: 2017-07-07
Attending: INTERNAL MEDICINE
Payer: MEDICARE

## 2017-07-07 PROCEDURE — 93798 PHYS/QHP OP CAR RHAB W/ECG: CPT

## 2017-07-10 ENCOUNTER — CARDPULM VISIT (OUTPATIENT)
Dept: CARDIAC REHAB | Facility: HOSPITAL | Age: 77
End: 2017-07-10
Attending: INTERNAL MEDICINE
Payer: MEDICARE

## 2017-07-10 PROCEDURE — 93798 PHYS/QHP OP CAR RHAB W/ECG: CPT

## 2017-07-11 RX ORDER — MELATONIN
325
Qty: 270 TABLET | Refills: 0 | Status: SHIPPED | OUTPATIENT
Start: 2017-07-11 | End: 2017-08-16

## 2017-07-11 RX ORDER — ATORVASTATIN CALCIUM 20 MG/1
20 TABLET, FILM COATED ORAL NIGHTLY
Qty: 90 TABLET | Refills: 3 | Status: SHIPPED | OUTPATIENT
Start: 2017-07-11 | End: 2019-07-25

## 2017-07-12 ENCOUNTER — CARDPULM VISIT (OUTPATIENT)
Dept: CARDIAC REHAB | Facility: HOSPITAL | Age: 77
End: 2017-07-12
Attending: INTERNAL MEDICINE
Payer: MEDICARE

## 2017-07-12 PROCEDURE — 93798 PHYS/QHP OP CAR RHAB W/ECG: CPT

## 2017-07-14 ENCOUNTER — CARDPULM VISIT (OUTPATIENT)
Dept: CARDIAC REHAB | Facility: HOSPITAL | Age: 77
End: 2017-07-14
Attending: INTERNAL MEDICINE
Payer: MEDICARE

## 2017-07-14 PROCEDURE — 93798 PHYS/QHP OP CAR RHAB W/ECG: CPT

## 2017-07-17 ENCOUNTER — CARDPULM VISIT (OUTPATIENT)
Dept: CARDIAC REHAB | Facility: HOSPITAL | Age: 77
End: 2017-07-17
Attending: INTERNAL MEDICINE
Payer: MEDICARE

## 2017-07-17 PROCEDURE — 93798 PHYS/QHP OP CAR RHAB W/ECG: CPT

## 2017-07-19 ENCOUNTER — CARDPULM VISIT (OUTPATIENT)
Dept: CARDIAC REHAB | Facility: HOSPITAL | Age: 77
End: 2017-07-19
Attending: INTERNAL MEDICINE
Payer: MEDICARE

## 2017-07-19 PROCEDURE — 93798 PHYS/QHP OP CAR RHAB W/ECG: CPT

## 2017-07-21 ENCOUNTER — CARDPULM VISIT (OUTPATIENT)
Dept: CARDIAC REHAB | Facility: HOSPITAL | Age: 77
End: 2017-07-21
Attending: INTERNAL MEDICINE
Payer: MEDICARE

## 2017-07-21 PROCEDURE — 93798 PHYS/QHP OP CAR RHAB W/ECG: CPT

## 2017-07-24 ENCOUNTER — CARDPULM VISIT (OUTPATIENT)
Dept: CARDIAC REHAB | Facility: HOSPITAL | Age: 77
End: 2017-07-24
Attending: INTERNAL MEDICINE
Payer: MEDICARE

## 2017-07-24 PROCEDURE — 93798 PHYS/QHP OP CAR RHAB W/ECG: CPT

## 2017-07-26 ENCOUNTER — CARDPULM VISIT (OUTPATIENT)
Dept: CARDIAC REHAB | Facility: HOSPITAL | Age: 77
End: 2017-07-26
Attending: INTERNAL MEDICINE
Payer: MEDICARE

## 2017-07-26 PROCEDURE — 93798 PHYS/QHP OP CAR RHAB W/ECG: CPT

## 2017-07-28 ENCOUNTER — CARDPULM VISIT (OUTPATIENT)
Dept: CARDIAC REHAB | Facility: HOSPITAL | Age: 77
End: 2017-07-28
Attending: INTERNAL MEDICINE
Payer: MEDICARE

## 2017-07-28 PROCEDURE — 93798 PHYS/QHP OP CAR RHAB W/ECG: CPT

## 2017-07-31 ENCOUNTER — CARDPULM VISIT (OUTPATIENT)
Dept: CARDIAC REHAB | Facility: HOSPITAL | Age: 77
End: 2017-07-31
Attending: INTERNAL MEDICINE
Payer: MEDICARE

## 2017-07-31 PROCEDURE — 93798 PHYS/QHP OP CAR RHAB W/ECG: CPT

## 2017-08-02 ENCOUNTER — CARDPULM VISIT (OUTPATIENT)
Dept: CARDIAC REHAB | Facility: HOSPITAL | Age: 77
End: 2017-08-02
Attending: INTERNAL MEDICINE
Payer: MEDICARE

## 2017-08-02 PROCEDURE — 93798 PHYS/QHP OP CAR RHAB W/ECG: CPT

## 2017-08-04 ENCOUNTER — CARDPULM VISIT (OUTPATIENT)
Dept: CARDIAC REHAB | Facility: HOSPITAL | Age: 77
End: 2017-08-04
Attending: INTERNAL MEDICINE
Payer: MEDICARE

## 2017-08-04 PROCEDURE — 93798 PHYS/QHP OP CAR RHAB W/ECG: CPT

## 2017-08-07 ENCOUNTER — CARDPULM VISIT (OUTPATIENT)
Dept: CARDIAC REHAB | Facility: HOSPITAL | Age: 77
End: 2017-08-07
Attending: INTERNAL MEDICINE
Payer: MEDICARE

## 2017-08-07 PROCEDURE — 93798 PHYS/QHP OP CAR RHAB W/ECG: CPT

## 2017-08-09 ENCOUNTER — CARDPULM VISIT (OUTPATIENT)
Dept: CARDIAC REHAB | Facility: HOSPITAL | Age: 77
End: 2017-08-09
Attending: INTERNAL MEDICINE
Payer: MEDICARE

## 2017-08-09 PROCEDURE — 93798 PHYS/QHP OP CAR RHAB W/ECG: CPT

## 2017-08-11 ENCOUNTER — CARDPULM VISIT (OUTPATIENT)
Dept: CARDIAC REHAB | Facility: HOSPITAL | Age: 77
End: 2017-08-11
Attending: INTERNAL MEDICINE
Payer: MEDICARE

## 2017-08-11 PROCEDURE — 93798 PHYS/QHP OP CAR RHAB W/ECG: CPT

## 2017-08-14 ENCOUNTER — CARDPULM VISIT (OUTPATIENT)
Dept: CARDIAC REHAB | Facility: HOSPITAL | Age: 77
End: 2017-08-14
Attending: INTERNAL MEDICINE
Payer: MEDICARE

## 2017-08-14 PROCEDURE — 93798 PHYS/QHP OP CAR RHAB W/ECG: CPT

## 2017-08-14 RX ORDER — MELATONIN
325
Qty: 270 TABLET | Refills: 3 | Status: CANCELLED | OUTPATIENT
Start: 2017-08-14

## 2017-08-15 NOTE — TELEPHONE ENCOUNTER
Informed patient that she can stop Ferrous Sulfate and will check her levels at next visit in September

## 2017-08-16 ENCOUNTER — CARDPULM VISIT (OUTPATIENT)
Dept: CARDIAC REHAB | Facility: HOSPITAL | Age: 77
End: 2017-08-16
Attending: INTERNAL MEDICINE
Payer: MEDICARE

## 2017-08-16 PROCEDURE — 93798 PHYS/QHP OP CAR RHAB W/ECG: CPT

## 2017-08-18 ENCOUNTER — CARDPULM VISIT (OUTPATIENT)
Dept: CARDIAC REHAB | Facility: HOSPITAL | Age: 77
End: 2017-08-18
Attending: INTERNAL MEDICINE
Payer: MEDICARE

## 2017-08-18 PROCEDURE — 93798 PHYS/QHP OP CAR RHAB W/ECG: CPT

## 2017-08-21 ENCOUNTER — CARDPULM VISIT (OUTPATIENT)
Dept: CARDIAC REHAB | Facility: HOSPITAL | Age: 77
End: 2017-08-21
Attending: INTERNAL MEDICINE
Payer: MEDICARE

## 2017-08-21 DIAGNOSIS — M85.80 OSTEOPENIA: ICD-10-CM

## 2017-08-21 PROCEDURE — 93798 PHYS/QHP OP CAR RHAB W/ECG: CPT

## 2017-08-22 ENCOUNTER — TELEPHONE (OUTPATIENT)
Dept: INTERNAL MEDICINE CLINIC | Facility: CLINIC | Age: 77
End: 2017-08-22

## 2017-08-22 DIAGNOSIS — M85.89 OSTEOPENIA OF MULTIPLE SITES: ICD-10-CM

## 2017-08-22 RX ORDER — ALENDRONATE SODIUM 70 MG/1
70 TABLET ORAL WEEKLY
Qty: 12 TABLET | Refills: 3 | Status: SHIPPED | OUTPATIENT
Start: 2017-08-22 | End: 2019-01-18

## 2017-08-22 NOTE — TELEPHONE ENCOUNTER
She believes you are questioning her Alendronate - she would like it refilled at [address] not [facility] but please call her back before 2:15 if possible

## 2017-08-23 ENCOUNTER — APPOINTMENT (OUTPATIENT)
Dept: CARDIAC REHAB | Facility: HOSPITAL | Age: 77
End: 2017-08-23
Attending: INTERNAL MEDICINE
Payer: MEDICARE

## 2017-08-31 ENCOUNTER — TELEPHONE (OUTPATIENT)
Dept: INTERNAL MEDICINE CLINIC | Facility: CLINIC | Age: 77
End: 2017-08-31

## 2017-08-31 NOTE — TELEPHONE ENCOUNTER
Since we can not do TB test in office they would like an order for a chest xray faxed to them   Fax # [phone]

## 2017-09-01 DIAGNOSIS — Z11.1 SCREENING-PULMONARY TB: Primary | ICD-10-CM

## 2017-09-05 ENCOUNTER — PRIOR ORIGINAL RECORDS (OUTPATIENT)
Dept: OTHER | Age: 77
End: 2017-09-05

## 2017-09-05 ENCOUNTER — TELEPHONE (OUTPATIENT)
Dept: INTERNAL MEDICINE CLINIC | Facility: CLINIC | Age: 77
End: 2017-09-05

## 2017-09-07 ENCOUNTER — PRIOR ORIGINAL RECORDS (OUTPATIENT)
Dept: OTHER | Age: 77
End: 2017-09-07

## 2017-09-13 LAB
ALBUMIN: 3.86 G/DL
ALKALINE PHOSPHATATE(ALK PHOS): 70 IU/L
BILIRUBIN TOTAL: 1 MG/DL
BUN: 20 MG/DL
CALCIUM: 9 MG/DL
CHLORIDE: 100.39 MEQ/L
CHOLESTEROL, TOTAL: 136 MG/DL
CREATININE KINASE: 514 U/L
CREATININE, SERUM: 1.54 MG/DL
FREE T4: 1.08 MG/DL
GLOBULIN: 3.6 G/DL
GLUCOSE: 136 MG/DL
HDL CHOLESTEROL: 48.9 MG/DL
HEMATOCRIT: 32.9 %
HEMOGLOBIN: 11.4 G/DL
LDL CHOLESTEROL: 66 MG/DL
PLATELETS: 288 K/UL
POTASSIUM, SERUM: 3.7 MEQ/L
PROTEIN, TOTAL: 7.43 G/DL
RED BLOOD COUNT: 3.91 X 10-6/U
SGOT (AST): 28 IU/L
SGPT (ALT): 13 IU/L
SODIUM: 138 MEQ/L
THYROID STIMULATING HORMONE: 6.08 MLU/L
TRIGLYCERIDES: 108 MG/DL
WHITE BLOOD COUNT: 8.1 X 10-3/U

## 2017-09-20 ENCOUNTER — PRIOR ORIGINAL RECORDS (OUTPATIENT)
Dept: OTHER | Age: 77
End: 2017-09-20

## 2017-09-20 LAB
BUN: 23 MG/DL
CALCIUM: 9.1 MG/DL
CHLORIDE: 98.87 MEQ/L
CREATININE KINASE: 466 U/L
CREATININE, SERUM: 1.45 MG/DL
GLUCOSE: 95 MG/DL
POTASSIUM, SERUM: 3.8 MEQ/L
SODIUM: 137 MEQ/L

## 2017-12-05 ENCOUNTER — PRIOR ORIGINAL RECORDS (OUTPATIENT)
Dept: OTHER | Age: 77
End: 2017-12-05

## 2017-12-05 ENCOUNTER — MYAURORA ACCOUNT LINK (OUTPATIENT)
Dept: OTHER | Age: 77
End: 2017-12-05

## 2017-12-15 ENCOUNTER — TELEPHONE (OUTPATIENT)
Dept: INTERNAL MEDICINE CLINIC | Facility: CLINIC | Age: 77
End: 2017-12-15

## 2017-12-15 RX ORDER — METOPROLOL SUCCINATE 25 MG/1
TABLET, EXTENDED RELEASE ORAL
Qty: 1 TABLET | Refills: 0 | Status: SHIPPED | OUTPATIENT
Start: 2017-12-15 | End: 2019-01-18

## 2017-12-15 NOTE — TELEPHONE ENCOUNTER
Refill request came through for metoprolol and MD wanted patient to be seen.  Patient stated that she no longer lives in [facility] and has already received her medication

## 2017-12-15 NOTE — TELEPHONE ENCOUNTER
Called patient and she stated that she no longer lives at [facility] and has already received her medication

## 2018-06-11 ENCOUNTER — PRIOR ORIGINAL RECORDS (OUTPATIENT)
Dept: OTHER | Age: 78
End: 2018-06-11

## 2018-06-11 ENCOUNTER — MYAURORA ACCOUNT LINK (OUTPATIENT)
Dept: OTHER | Age: 78
End: 2018-06-11

## 2018-06-14 ENCOUNTER — HOSPITAL ENCOUNTER (OUTPATIENT)
Dept: MAMMOGRAPHY | Facility: HOSPITAL | Age: 78
Discharge: HOME | End: 2018-06-14
Attending: INTERNAL MEDICINE
Payer: MEDICARE

## 2018-06-14 DIAGNOSIS — Z12.39 ENCOUNTER FOR SCREENING FOR MALIGNANT NEOPLASM OF BREAST: ICD-10-CM

## 2018-06-14 PROCEDURE — 77063 BREAST TOMOSYNTHESIS BI: CPT | Performed by: INTERNAL MEDICINE

## 2018-06-14 PROCEDURE — 77067 SCR MAMMO BI INCL CAD: CPT | Performed by: INTERNAL MEDICINE

## 2018-06-15 ENCOUNTER — PRIOR ORIGINAL RECORDS (OUTPATIENT)
Dept: OTHER | Age: 78
End: 2018-06-15

## 2018-10-08 ENCOUNTER — PRIOR ORIGINAL RECORDS (OUTPATIENT)
Dept: OTHER | Age: 78
End: 2018-10-08

## 2018-10-09 ENCOUNTER — HOSPITAL ENCOUNTER (INPATIENT)
Facility: HOSPITAL | Age: 78
LOS: 2 days | Discharge: NURSING, REHAB OR LONG-TERM CARE | DRG: 309 | End: 2018-10-11
Attending: INTERNAL MEDICINE | Admitting: INTERNAL MEDICINE
Payer: MEDICARE

## 2018-10-09 PROBLEM — I48.91 AFIB (HCC): Status: ACTIVE | Noted: 2018-10-09

## 2018-10-09 PROCEDURE — 99221 1ST HOSP IP/OBS SF/LOW 40: CPT | Performed by: HOSPITALIST

## 2018-10-09 RX ORDER — MELATONIN
325 2 TIMES DAILY WITH MEALS
COMMUNITY
End: 2019-07-25

## 2018-10-09 RX ORDER — ACETAMINOPHEN 325 MG/1
650 TABLET ORAL EVERY 6 HOURS PRN
Status: DISCONTINUED | OUTPATIENT
Start: 2018-10-09 | End: 2018-10-11

## 2018-10-09 RX ORDER — ASPIRIN 325 MG
325 TABLET ORAL DAILY
Status: DISCONTINUED | OUTPATIENT
Start: 2018-10-10 | End: 2018-10-11

## 2018-10-09 RX ORDER — LORAZEPAM 1 MG/1
1 TABLET ORAL NIGHTLY PRN
Status: DISCONTINUED | OUTPATIENT
Start: 2018-10-09 | End: 2018-10-11

## 2018-10-09 RX ORDER — MULTIVITAMIN/IRON/FOLIC ACID 18MG-0.4MG
250 TABLET ORAL DAILY
Status: DISCONTINUED | OUTPATIENT
Start: 2018-10-10 | End: 2018-10-11

## 2018-10-09 RX ORDER — FUROSEMIDE 40 MG/1
40 TABLET ORAL DAILY
Status: DISCONTINUED | OUTPATIENT
Start: 2018-10-10 | End: 2018-10-11

## 2018-10-09 RX ORDER — DOCUSATE SODIUM 100 MG/1
100 CAPSULE, LIQUID FILLED ORAL 2 TIMES DAILY
Status: DISCONTINUED | OUTPATIENT
Start: 2018-10-09 | End: 2018-10-11

## 2018-10-09 RX ORDER — ATORVASTATIN CALCIUM 20 MG/1
20 TABLET, FILM COATED ORAL NIGHTLY
Status: DISCONTINUED | OUTPATIENT
Start: 2018-10-10 | End: 2018-10-11

## 2018-10-09 RX ORDER — LEVOTHYROXINE SODIUM 0.07 MG/1
75 TABLET ORAL
Status: DISCONTINUED | OUTPATIENT
Start: 2018-10-10 | End: 2018-10-11

## 2018-10-09 RX ORDER — MELATONIN
325 2 TIMES DAILY WITH MEALS
Status: DISCONTINUED | OUTPATIENT
Start: 2018-10-09 | End: 2018-10-11

## 2018-10-09 RX ORDER — ENOXAPARIN SODIUM 100 MG/ML
40 INJECTION SUBCUTANEOUS DAILY
Status: DISCONTINUED | OUTPATIENT
Start: 2018-10-10 | End: 2018-10-09

## 2018-10-09 RX ORDER — TEMAZEPAM 15 MG/1
15 CAPSULE ORAL NIGHTLY PRN
Status: DISCONTINUED | OUTPATIENT
Start: 2018-10-09 | End: 2018-10-11

## 2018-10-09 RX ORDER — PANTOPRAZOLE SODIUM 20 MG/1
20 TABLET, DELAYED RELEASE ORAL DAILY
Status: DISCONTINUED | OUTPATIENT
Start: 2018-10-10 | End: 2018-10-11

## 2018-10-09 RX ORDER — NITROGLYCERIN 0.4 MG/1
0.4 TABLET SUBLINGUAL EVERY 5 MIN PRN
Status: DISCONTINUED | OUTPATIENT
Start: 2018-10-09 | End: 2018-10-11

## 2018-10-09 RX ORDER — SODIUM CHLORIDE 9 MG/ML
INJECTION, SOLUTION INTRAVENOUS CONTINUOUS
Status: DISCONTINUED | OUTPATIENT
Start: 2018-10-10 | End: 2018-10-11

## 2018-10-09 RX ORDER — METOPROLOL SUCCINATE 25 MG/1
25 TABLET, EXTENDED RELEASE ORAL
Status: DISCONTINUED | OUTPATIENT
Start: 2018-10-10 | End: 2018-10-10

## 2018-10-09 NOTE — HISTORICAL OFFICE NOTE
Nasrinkamar Jones  : 1940  ACCOUNT:  [id number]  [phone]  PCP: Dr. Tay Milligan DATE: 2018  DICTATED BY:  [Dr. Donald Larios: [Followup of Aneurysm, abdominal s/p stent, Followup of Atrial fibrillation an -  153, Height -   62 , BMI - 28.0 ]    CONS: well developed, well nourished. WEIGHT: BMI parameters reviewed and discussed. HEAD/FACE: no trauma and normocephalic. EYES: conjunctivae not injected and no xanthelasma.  ENT: mucosa pink and moist. NECK: jugul 06/30/16 Metoprolol Succinate E25MG      one tablet daily                         04/06/16 Atorvastatin Calcium  20MG      1 tablet daily at bedtime                04/06/16 Magnesium             400MG     1 capsule daily                          04

## 2018-10-10 ENCOUNTER — APPOINTMENT (OUTPATIENT)
Dept: CV DIAGNOSTICS | Facility: HOSPITAL | Age: 78
DRG: 309 | End: 2018-10-10
Attending: NURSE PRACTITIONER
Payer: MEDICARE

## 2018-10-10 PROCEDURE — 93325 DOPPLER ECHO COLOR FLOW MAPG: CPT | Performed by: NURSE PRACTITIONER

## 2018-10-10 PROCEDURE — 5A2204Z RESTORATION OF CARDIAC RHYTHM, SINGLE: ICD-10-PCS | Performed by: INTERNAL MEDICINE

## 2018-10-10 PROCEDURE — 99232 SBSQ HOSP IP/OBS MODERATE 35: CPT | Performed by: HOSPITALIST

## 2018-10-10 PROCEDURE — B246ZZ4 ULTRASONOGRAPHY OF RIGHT AND LEFT HEART, TRANSESOPHAGEAL: ICD-10-PCS | Performed by: INTERNAL MEDICINE

## 2018-10-10 PROCEDURE — 93320 DOPPLER ECHO COMPLETE: CPT | Performed by: NURSE PRACTITIONER

## 2018-10-10 RX ORDER — IPRATROPIUM BROMIDE AND ALBUTEROL SULFATE 2.5; .5 MG/3ML; MG/3ML
3 SOLUTION RESPIRATORY (INHALATION) EVERY 6 HOURS PRN
Status: DISCONTINUED | OUTPATIENT
Start: 2018-10-10 | End: 2018-10-11

## 2018-10-10 RX ORDER — METOPROLOL SUCCINATE 25 MG/1
25 TABLET, EXTENDED RELEASE ORAL
Status: DISCONTINUED | OUTPATIENT
Start: 2018-10-10 | End: 2018-10-11

## 2018-10-10 RX ORDER — SODIUM CHLORIDE 9 MG/ML
INJECTION, SOLUTION INTRAVENOUS CONTINUOUS
Status: DISCONTINUED | OUTPATIENT
Start: 2018-10-10 | End: 2018-10-10

## 2018-10-10 RX ORDER — POTASSIUM CHLORIDE 20 MEQ/1
40 TABLET, EXTENDED RELEASE ORAL EVERY 4 HOURS
Status: ACTIVE | OUTPATIENT
Start: 2018-10-10 | End: 2018-10-10

## 2018-10-10 RX ORDER — MIDAZOLAM HYDROCHLORIDE 1 MG/ML
INJECTION INTRAMUSCULAR; INTRAVENOUS
Status: COMPLETED
Start: 2018-10-10 | End: 2018-10-10

## 2018-10-10 NOTE — CM/SW NOTE
Patient was screened during rounds, no needs were identified at this time. Rn to contact SW/CM if needs arise.      Smooth Gomez RN,   Phone [phone]  Pager [phone]

## 2018-10-10 NOTE — PROGRESS NOTES
[facility]PITAL[facility]  Progress Note     Marciano Em Patient Status:  Inpatient    1940 MRN [id number]   [facility] 2NE-A Attending Kaveh Garcia MD   Hosp Day # 1 PCP [address]     Chief Complaint: palpitations sob    S: Pa apixaban  5 mg Oral BID   • docusate sodium  100 mg Oral BID       ASSESSMENT / PLAN:     1. Hx of Atrial fibrillation on Toprol and Eliquis  2. CAD sp CABG on Aspirin, Toprol  3. Essential hypertension controlled  4. Dyslipidemia on Lipitor  5.  AAA sp rep

## 2018-10-10 NOTE — PROGRESS NOTES
CARDIOVASCULAR - ADULT     • Maintains optimal cardiac output and hemodynamic stability Progressing     • Absence of cardiac arrhythmias or at baseline Progressing          ASSUMED CARE 0700.   TELE AFIB WITH HR-100S  O2 SAT-96% ROOM, AIR  DAUGTHER ON BEDSI

## 2018-10-10 NOTE — PROCEDURES
Cardiology Transesophageal Echo Note    PRE-PROCEDURE DIAGNOSIS:  Atrial fibrillation with rapid ventricular response    PROCEDURE: Transesophageal Echocardiogram.    SEDATION:   Topical spray x 1  Versed: 7 mg  Fentanyl: 150 mcg  I personally supervised t

## 2018-10-10 NOTE — CONSULTS
[facility]  [address] Patient Status:  Inpatient    1940 MRN [id number]   [facility] 2NE-A Attending Yvonne Huerta MD   Hosp Day # 1 PCP 1 [address]     Reason for consult: Medical mangement    Req CATARACT     • CATH BARE METAL STENT (BMS)     • COLONOSCOPY     • EGD N/A 8/19/2016    Procedure: ESOPHAGOGASTRODUODENOSCOPY (EGD);   Surgeon: Phillip Lopez MD;  Location: [address] [place] [address] ENDOSCOPY   • EGD N/A 8/26/2016    Procedure: ESOPHAGOGASTRODUODENOSCOPY (EGD TAKE 1 TABLET DAILY Disp: 1 tablet Rfl: 0   atorvastatin 20 MG Oral Tab Take 1 tablet (20 mg total) by mouth nightly. Disp: 90 tablet Rfl: 3   furosemide 40 MG Oral Tab Take 40 mg by mouth daily.    Disp:  Rfl:    Levothyroxine Sodium 75 MCG Oral Tab Take 1 tenderness or deformity. Abdomen: Soft, nontender, nondistended. Positive bowel sounds. No rebound, guarding or organomegaly. Extremities: Trace edema  Integument: No rashes or lesions. Psychiatric: Appropriate mood and affect.       Diagnostic Data:

## 2018-10-10 NOTE — PLAN OF CARE
CARDIOVASCULAR - ADULT    • Maintains optimal cardiac output and hemodynamic stability Progressing    • Absence of cardiac arrhythmias or at baseline Progressing        Assumed care of patient at 2330.  Monitor tele-A fib/flutter,  on 2L per nasal cannul

## 2018-10-10 NOTE — CONSULTS
[facility]  Report of Consultation    Deveugene Snowball Patient Status:  Inpatient    1940 MRN [id number]   [facility] 2NE-A Attending Helen Dixon MD   Hosp Day # 1 PCP [address]     Reason • Disorder of thyroid    • GI bleed     first one 08/22/16   • Gilbert's syndrome    • Heart attack ([facility] [address]    • High blood pressure    • High cholesterol    • History of blood transfusion     after child birth   • History of pubovaginal sling 2007   • MS • Other (lung disease) Sister    • Suicide History Daughter 43   • Suicide History Son 43   • Other (lung cancer) Daughter 50      reports that she quit smoking about 23 years ago. Her smoking use included cigarettes.  She has a 30.00 pack-year smoking hi Av °F (36.7 °C), Min:97.3 °F (36.3 °C), Max:98.4 °F (36.9 °C)    Wt Readings from Last 3 Encounters:  10/10/18 : 149 lb 11.1 oz (67.9 kg)  06/15/17 : 147 lb 4.8 oz (66.8 kg)  17 : 142 lb 9.6 oz (64.7 kg)      Telemetry: afib  General: Alert and o

## 2018-10-10 NOTE — PROGRESS NOTES
Pt s/p ALEJANDRO with Dr. Danielle Paniagua and successful cardioversion with Dr. Richar Vargas. The cardioversion was successful at one attempt at 200 joules in sync. EKG done after to confirm. Pt initially given brevital in increments for a total of 70mg via her right AC IV.  Pt was d

## 2018-10-10 NOTE — PROCEDURES
PROCEDURE(S) PERFORMED:    1. Cardioversion. 2.     Sedation     :  Gilma Herzog MD     ANESTHESIA:  IV sedation. INDICATION:  Persistent atrial fibrillation. COMPLICATIONS:  None.      METHODS:  The patient was brought to the outpatient

## 2018-10-11 VITALS
OXYGEN SATURATION: 96 % | DIASTOLIC BLOOD PRESSURE: 57 MMHG | SYSTOLIC BLOOD PRESSURE: 119 MMHG | TEMPERATURE: 98 F | WEIGHT: 149.69 LBS | BODY MASS INDEX: 28 KG/M2 | HEART RATE: 87 BPM | RESPIRATION RATE: 20 BRPM

## 2018-10-11 PROCEDURE — 99239 HOSP IP/OBS DSCHRG MGMT >30: CPT | Performed by: HOSPITALIST

## 2018-10-11 NOTE — PROGRESS NOTES
[facility]  Cardiology Progress Note    Subjective:  No chest pain or shortness of breath.      Objective:  /52 (BP Location: Left arm)   Pulse 82   Temp 98.3 °F (36.8 °C) (Oral)   Resp 18   Wt 149 lb 11.1 oz (67.9 kg)   SpO2 98%   BMI 28.28 kg/m office    CYNDI Lee  10/11/2018  8:38 AM

## 2018-10-11 NOTE — DISCHARGE SUMMARY
[facility] [place] [facility]OSPITAL[facility]  DISCHARGE SUMMARY     Gopal Soto Patient Status:  Inpatient    1940 MRN [id number]   [facility] 2NE-A Attending Adrian Hough MD   Hosp Day # 2 PCP Jean Claude Hernández     Date of Admission: 10/9/2018  Date of Tabs  Commonly known as:  LIPITOR      Take 1 tablet (20 mg total) by mouth nightly. Quantity:  90 tablet  Refills:  3     Calcium-Vitamin D 600-400 MG-UNIT Tabs      Take 1 tablet by mouth 2 (two) times daily.    Quantity:  180 tablet  Refills:  3     do nontender, nondistended. Positive bowel sounds. No rebound or guarding. Neurologic: No focal neurological deficits. Musculoskeletal: Moves all extremities. Extremities: No edema.   -----------------------------------------------------------------------

## 2018-10-11 NOTE — PROGRESS NOTES
UP WALKING WITHOUT ANY SOB OR RUN OF AFIB. SON ON BEDSIDE. DENIES C/O DISCOMFORT. TELE NSR WITH HR-80S. DC INSTRUCTION GIVEN,REPORT GIVEN TO GEE -NURSE AT [facility]. VERBALIZED UNDERSTANDING. DC TELE. DC HL.

## 2018-10-11 NOTE — CM/SW NOTE
10/11/18 1400   CM/SW Screening   Information Source [facility] staff; Chart review;Nursing rounds   Patient's [address] Name ([place] at [facility])     At d/c, rn to call report to [phone] and send avs.    [facility]

## 2018-10-11 NOTE — PLAN OF CARE
PLAN OF CARE - SHIFT NOTE    Assumed care at 2300. Patient is ANOx4, on RA, tele NSR, cont B/B however last BM 10/6 per patient, up with SBA + walker. Patient had ALEJANDRO & successful cardioversion today. Patient now in NSR. Plan to DC tomorrow.

## 2018-10-13 ENCOUNTER — CHARTING TRANS (OUTPATIENT)
Dept: OTHER | Age: 78
End: 2018-10-13

## 2018-10-15 ENCOUNTER — CHARTING TRANS (OUTPATIENT)
Dept: OTHER | Age: 78
End: 2018-10-15

## 2018-10-23 ENCOUNTER — PRIOR ORIGINAL RECORDS (OUTPATIENT)
Dept: OTHER | Age: 78
End: 2018-10-23

## 2018-10-24 ENCOUNTER — PRIOR ORIGINAL RECORDS (OUTPATIENT)
Dept: OTHER | Age: 78
End: 2018-10-24

## 2018-11-27 ENCOUNTER — PRIOR ORIGINAL RECORDS (OUTPATIENT)
Dept: OTHER | Age: 78
End: 2018-11-27

## 2018-11-27 ENCOUNTER — MYAURORA ACCOUNT LINK (OUTPATIENT)
Dept: OTHER | Age: 78
End: 2018-11-27

## 2018-12-06 ENCOUNTER — MYAURORA ACCOUNT LINK (OUTPATIENT)
Dept: OTHER | Age: 78
End: 2018-12-06

## 2018-12-06 ENCOUNTER — PRIOR ORIGINAL RECORDS (OUTPATIENT)
Dept: OTHER | Age: 78
End: 2018-12-06

## 2018-12-07 ENCOUNTER — PRIOR ORIGINAL RECORDS (OUTPATIENT)
Dept: OTHER | Age: 78
End: 2018-12-07

## 2018-12-21 ENCOUNTER — PRIOR ORIGINAL RECORDS (OUTPATIENT)
Dept: OTHER | Age: 78
End: 2018-12-21

## 2018-12-21 ENCOUNTER — HOSPITAL ENCOUNTER (OUTPATIENT)
Dept: LAB | Facility: HOSPITAL | Age: 78
Discharge: HOME | End: 2018-12-21
Attending: INTERNAL MEDICINE
Payer: MEDICARE

## 2018-12-21 PROCEDURE — 83880 ASSAY OF NATRIURETIC PEPTIDE: CPT | Performed by: INTERNAL MEDICINE

## 2018-12-21 PROCEDURE — 36415 COLL VENOUS BLD VENIPUNCTURE: CPT | Performed by: INTERNAL MEDICINE

## 2018-12-21 PROCEDURE — 80048 BASIC METABOLIC PNL TOTAL CA: CPT | Performed by: INTERNAL MEDICINE

## 2018-12-21 PROCEDURE — 83735 ASSAY OF MAGNESIUM: CPT | Performed by: INTERNAL MEDICINE

## 2018-12-24 ENCOUNTER — PRIOR ORIGINAL RECORDS (OUTPATIENT)
Dept: OTHER | Age: 78
End: 2018-12-24

## 2019-01-09 LAB
BUN: 18 MG/DL
CALCIUM: 9.3 MG/DL
CHLORIDE: 100 MEQ/L
CREATININE, SERUM: 1.34 MG/DL
GLUCOSE: 93 MG/DL
MAGNESIUM: 2.2 MG/DL
POTASSIUM, SERUM: 3.2 MEQ/L
PROBNP: 2401 PG/ML
SODIUM: 139 MEQ/L

## 2019-01-18 RX ORDER — METOPROLOL SUCCINATE 50 MG/1
50 TABLET, EXTENDED RELEASE ORAL DAILY
COMMUNITY
End: 2019-07-25

## 2019-01-18 RX ORDER — LEVOTHYROXINE SODIUM 0.07 MG/1
75 TABLET ORAL
COMMUNITY
End: 2019-07-25

## 2019-01-18 RX ORDER — MAGNESIUM OXIDE 400 MG (241.3 MG MAGNESIUM) TABLET
400 TABLET 2 TIMES DAILY
COMMUNITY
End: 2019-07-25

## 2019-01-18 RX ORDER — POTASSIUM CHLORIDE 1.5 G/1.77G
20 POWDER, FOR SOLUTION ORAL 2 TIMES DAILY
COMMUNITY
End: 2019-07-25

## 2019-01-18 RX ORDER — TORSEMIDE 20 MG/1
20 TABLET ORAL 2 TIMES DAILY
COMMUNITY
End: 2019-07-25

## 2019-01-18 NOTE — HISTORICAL OFFICE NOTE
Zenaida Adams  : 1940  ACCOUNT:  [id number]  [phone]  PCP:    TODAY'S DATE: 2018  DICTATED BY:  [Dr. Iris Valdez: [Followup of Atrial fibrillation, persistent.]    HPI:    [On 2018, Angelina Allen, a 66year old f melena, hematochezia. : no hematuria. INTEG: no new rashes, lesions. MS: no limiting arthritis. NEURO: no localized deficits. HEM/LYMPH: denies easy bruising. ALL: no new food or enviornmental allergies.       PAST HISTORY: MS, COPD,cataracts,arthritis, p status post bypass surgery in 2017 presents with recurrent symptomatic atrial fibrillation. I suspect that her atrial fibrillation is causing worsening diastolic heart failure which led to her lower extremity edema.     ASSESSMENT:  1. CAD, of native vesse 5000UNIT  1 capsule by mouth daily                 03/24/17 Furosemide            40MG      1 po twice daily                         03/03/17 Ferrous Sulfate       325 (65   three times daily                        03/03/17 Levothyroxine Sodium  75MCG

## 2019-01-18 NOTE — HISTORICAL OFFICE NOTE
: 1940  ACCOUNT:  [id number]  [phone]  PCP:    TODAY'S DATE: 2018  DICTATED BY:  Beryl Hutchinson Frommelt, APN]      CHIEF COMPLAINT: [Followup of Atrial fibrillation, persistent.]    HPI:    [On 2018, Brianna Vogel, a 66year old female, p Lives at [facility]. ALLERGIES: No Known Allergies    MEDICATIONS: Selected prescriptions see below    VITAL SIGNS: [B/P - 110/60 , Pulse - 60, Weight -  147, Height -   61 , BMI - 27.8 ]    CONS: well developed, well nourished.  WEIGHT: BMI parameter 03/03/17 Ferrous Sulfate       325 (65   three times daily                        03/03/17 Levothyroxine Sodium  75MCG     daily                                    03/03/17 Omeprazole            20MG      daily                                    04/06/16

## 2019-01-21 ENCOUNTER — ANESTHESIA EVENT (OUTPATIENT)
Dept: INTERVENTIONAL RADIOLOGY/VASCULAR | Facility: HOSPITAL | Age: 79
End: 2019-01-21
Payer: MEDICARE

## 2019-01-22 ENCOUNTER — HOSPITAL ENCOUNTER (OUTPATIENT)
Dept: CV DIAGNOSTICS | Facility: HOSPITAL | Age: 79
Discharge: HOME | End: 2019-01-22
Attending: INTERNAL MEDICINE
Payer: MEDICARE

## 2019-01-22 ENCOUNTER — HOSPITAL ENCOUNTER (OUTPATIENT)
Dept: INTERVENTIONAL RADIOLOGY/VASCULAR | Facility: HOSPITAL | Age: 79
Discharge: HOME | End: 2019-01-23
Attending: INTERNAL MEDICINE | Admitting: INTERNAL MEDICINE
Payer: MEDICARE

## 2019-01-22 ENCOUNTER — ANESTHESIA (OUTPATIENT)
Dept: INTERVENTIONAL RADIOLOGY/VASCULAR | Facility: HOSPITAL | Age: 79
End: 2019-01-22
Payer: MEDICARE

## 2019-01-22 ENCOUNTER — PRIOR ORIGINAL RECORDS (OUTPATIENT)
Dept: OTHER | Age: 79
End: 2019-01-22

## 2019-01-22 DIAGNOSIS — I48.91 A-FIB (HCC): ICD-10-CM

## 2019-01-22 LAB
ANION GAP SERPL CALC-SCNC: 7 MMOL/L (ref 0–18)
BUN BLD-MCNC: 13 MG/DL (ref 8–20)
BUN/CREAT SERPL: 11.4 (ref 10–20)
CALCIUM BLD-MCNC: 9.5 MG/DL (ref 8.3–10.3)
CHLORIDE SERPL-SCNC: 106 MMOL/L (ref 101–111)
CO2 SERPL-SCNC: 25 MMOL/L (ref 22–32)
CREAT BLD-MCNC: 1.14 MG/DL (ref 0.55–1.02)
GLUCOSE BLD-MCNC: 108 MG/DL (ref 70–99)
ISTAT ACTIVATED CLOTTING TIME: 169 SECONDS (ref 74–137)
ISTAT ACTIVATED CLOTTING TIME: 208 SECONDS (ref 74–137)
ISTAT ACTIVATED CLOTTING TIME: 285 SECONDS (ref 74–137)
ISTAT ACTIVATED CLOTTING TIME: 290 SECONDS (ref 74–137)
ISTAT ACTIVATED CLOTTING TIME: 301 SECONDS (ref 74–137)
ISTAT ACTIVATED CLOTTING TIME: 307 SECONDS (ref 74–137)
ISTAT ACTIVATED CLOTTING TIME: 345 SECONDS (ref 74–137)
OSMOLALITY SERPL CALC.SUM OF ELEC: 287 MOSM/KG (ref 275–295)
POTASSIUM SERPL-SCNC: 4.1 MMOL/L (ref 3.6–5.1)
SODIUM SERPL-SCNC: 138 MMOL/L (ref 136–144)

## 2019-01-22 PROCEDURE — 93662 INTRACARDIAC ECG (ICE): CPT

## 2019-01-22 PROCEDURE — 80048 BASIC METABOLIC PNL TOTAL CA: CPT | Performed by: INTERNAL MEDICINE

## 2019-01-22 PROCEDURE — 93312 ECHO TRANSESOPHAGEAL: CPT

## 2019-01-22 PROCEDURE — 85347 COAGULATION TIME ACTIVATED: CPT

## 2019-01-22 PROCEDURE — 02583ZZ DESTRUCTION OF CONDUCTION MECHANISM, PERCUTANEOUS APPROACH: ICD-10-PCS | Performed by: INTERNAL MEDICINE

## 2019-01-22 PROCEDURE — 93010 ELECTROCARDIOGRAM REPORT: CPT | Performed by: INTERNAL MEDICINE

## 2019-01-22 PROCEDURE — B24BZZ4 ULTRASONOGRAPHY OF HEART WITH AORTA, TRANSESOPHAGEAL: ICD-10-PCS | Performed by: INTERNAL MEDICINE

## 2019-01-22 PROCEDURE — 93656 COMPRE EP EVAL ABLTJ ATR FIB: CPT

## 2019-01-22 PROCEDURE — 93613 INTRACARDIAC EPHYS 3D MAPG: CPT

## 2019-01-22 PROCEDURE — B246YZZ ULTRASONOGRAPHY OF RIGHT AND LEFT HEART USING OTHER CONTRAST: ICD-10-PCS | Performed by: INTERNAL MEDICINE

## 2019-01-22 PROCEDURE — 93320 DOPPLER ECHO COMPLETE: CPT | Performed by: INTERNAL MEDICINE

## 2019-01-22 PROCEDURE — 93005 ELECTROCARDIOGRAM TRACING: CPT

## 2019-01-22 PROCEDURE — 93325 DOPPLER ECHO COLOR FLOW MAPG: CPT | Performed by: INTERNAL MEDICINE

## 2019-01-22 RX ORDER — ASPIRIN 81 MG/1
81 TABLET ORAL DAILY
Status: DISCONTINUED | OUTPATIENT
Start: 2019-01-22 | End: 2019-01-23

## 2019-01-22 RX ORDER — HEPARIN SODIUM 1000 [USP'U]/ML
INJECTION, SOLUTION INTRAVENOUS; SUBCUTANEOUS
Status: COMPLETED
Start: 2019-01-22 | End: 2019-01-22

## 2019-01-22 RX ORDER — SODIUM CHLORIDE 9 MG/ML
INJECTION, SOLUTION INTRAVENOUS CONTINUOUS
Status: DISCONTINUED | OUTPATIENT
Start: 2019-01-22 | End: 2019-01-23

## 2019-01-22 RX ORDER — HEPARIN SODIUM 5000 [USP'U]/ML
INJECTION, SOLUTION INTRAVENOUS; SUBCUTANEOUS
Status: COMPLETED
Start: 2019-01-22 | End: 2019-01-22

## 2019-01-22 RX ORDER — PROTAMINE SULFATE 10 MG/ML
INJECTION, SOLUTION INTRAVENOUS
Status: COMPLETED
Start: 2019-01-22 | End: 2019-01-22

## 2019-01-22 RX ORDER — ONDANSETRON 2 MG/ML
4 INJECTION INTRAMUSCULAR; INTRAVENOUS AS NEEDED
Status: DISCONTINUED | OUTPATIENT
Start: 2019-01-22 | End: 2019-01-22 | Stop reason: HOSPADM

## 2019-01-22 RX ORDER — PANTOPRAZOLE SODIUM 40 MG/1
40 TABLET, DELAYED RELEASE ORAL
Status: DISCONTINUED | OUTPATIENT
Start: 2019-01-23 | End: 2019-01-23

## 2019-01-22 RX ORDER — METOPROLOL SUCCINATE 50 MG/1
50 TABLET, EXTENDED RELEASE ORAL
Status: DISCONTINUED | OUTPATIENT
Start: 2019-01-23 | End: 2019-01-23

## 2019-01-22 RX ORDER — DEXAMETHASONE SODIUM PHOSPHATE 4 MG/ML
4 VIAL (ML) INJECTION AS NEEDED
Status: DISCONTINUED | OUTPATIENT
Start: 2019-01-22 | End: 2019-01-22 | Stop reason: HOSPADM

## 2019-01-22 RX ORDER — SODIUM CHLORIDE, SODIUM LACTATE, POTASSIUM CHLORIDE, CALCIUM CHLORIDE 600; 310; 30; 20 MG/100ML; MG/100ML; MG/100ML; MG/100ML
INJECTION, SOLUTION INTRAVENOUS CONTINUOUS
Status: DISCONTINUED | OUTPATIENT
Start: 2019-01-22 | End: 2019-01-23

## 2019-01-22 RX ORDER — NALOXONE HYDROCHLORIDE 0.4 MG/ML
80 INJECTION, SOLUTION INTRAMUSCULAR; INTRAVENOUS; SUBCUTANEOUS AS NEEDED
Status: DISCONTINUED | OUTPATIENT
Start: 2019-01-22 | End: 2019-01-22 | Stop reason: HOSPADM

## 2019-01-22 RX ORDER — LIDOCAINE HYDROCHLORIDE 10 MG/ML
INJECTION, SOLUTION EPIDURAL; INFILTRATION; INTRACAUDAL; PERINEURAL
Status: COMPLETED
Start: 2019-01-22 | End: 2019-01-22

## 2019-01-22 RX ORDER — TORSEMIDE 20 MG/1
20 TABLET ORAL 2 TIMES DAILY
Status: DISCONTINUED | OUTPATIENT
Start: 2019-01-22 | End: 2019-01-23

## 2019-01-22 RX ORDER — ATORVASTATIN CALCIUM 20 MG/1
20 TABLET, FILM COATED ORAL NIGHTLY
Status: DISCONTINUED | OUTPATIENT
Start: 2019-01-23 | End: 2019-01-23

## 2019-01-22 RX ORDER — POTASSIUM CHLORIDE 1.5 G/1.77G
20 POWDER, FOR SOLUTION ORAL 2 TIMES DAILY
Status: DISCONTINUED | OUTPATIENT
Start: 2019-01-22 | End: 2019-01-23

## 2019-01-22 RX ORDER — LEVOTHYROXINE SODIUM 0.07 MG/1
75 TABLET ORAL
Status: DISCONTINUED | OUTPATIENT
Start: 2019-01-23 | End: 2019-01-23

## 2019-01-22 RX ORDER — MIDAZOLAM HYDROCHLORIDE 1 MG/ML
1 INJECTION INTRAMUSCULAR; INTRAVENOUS EVERY 5 MIN PRN
Status: DISCONTINUED | OUTPATIENT
Start: 2019-01-22 | End: 2019-01-22 | Stop reason: HOSPADM

## 2019-01-22 RX ADMIN — SODIUM CHLORIDE: 9 INJECTION, SOLUTION INTRAVENOUS at 22:09:00

## 2019-01-22 RX ADMIN — POTASSIUM CHLORIDE 20 MEQ: 1.5 POWDER, FOR SOLUTION ORAL at 22:08:00

## 2019-01-22 RX ADMIN — TORSEMIDE 20 MG: 20 TABLET ORAL at 21:13:00

## 2019-01-22 RX ADMIN — SODIUM CHLORIDE: 9 INJECTION, SOLUTION INTRAVENOUS at 17:21:00

## 2019-01-22 NOTE — ANESTHESIA PREPROCEDURE EVALUATION
PRE-OP EVALUATION    Patient Name: Gabriel Herman    Pre-op Diagnosis: * No pre-op diagnosis entered *    * No procedures listed *    * No surgeons found in log *    Pre-op vitals reviewed. Body mass index is 26.45 kg/m².     Current medications re reviewed. Anesthetic Complications  (-) history of anesthetic complications         GI/Hepatic/Renal      (+) GERD       (+) chronic renal disease and CRI                   Cardiovascular      ECG reviewed.   Exercise tolerance: poor     MET: <=4      (+ Alcohol/week: 0.0 oz      Drug use: No     Available pre-op labs reviewed.      Lab Results   Component Value Date     01/22/2019    K 4.1 01/22/2019     01/22/2019    CO2 25.0 01/22/2019    BUN 13 01/22/2019    CREATSERUM 1.14 (H) 01/22/2019

## 2019-01-22 NOTE — H&P
[facility]  H&P    Zenon Macias Patient Status:  Outpatient in a Bed    1940 MRN [id number]   Location [facility] Attending Rosalie Zamorano MD   Hosp Day # 0 PCP Ping Alvarez Surgical History:   Procedure Laterality Date   • ABDOMINAL AORTIC ANEURYSM ENDOSCOPIC N/A 6/6/2016    Performed by Carolyn Ang MD at [address] [facility]  2/3/2017   • ANGIOPLASTY (CORONARY)  2010    stent   • BYPASS SURGERY  2/6/2017    CABG x 3   • kg).  No data recorded. Wt Readings from Last 3 Encounters:  01/18/19 : 140 lb (63.5 kg)  10/11/18 : 149 lb 11.1 oz (67.9 kg)  06/15/17 : 147 lb 4.8 oz (66.8 kg)      Telemetry: SR  General: Alert and oriented in no apparent distress.   HEENT: No focal d

## 2019-01-22 NOTE — PROCEDURES
Cardiology Transesophageal Echo Note    PRE-PROCEDURE DIAGNOSIS: afib    PROCEDURE: Transesophageal Echocardiogram.    SEDATION:   Anesthesia supervised full sedation in the EP lab.     DESCRIPTION:   After adequate anesthesia, the ALEJANDRO probe was placed in t

## 2019-01-22 NOTE — PROCEDURES
[facility]   Procedure Note    [place] Location: Cath Lab   CSN [id number] MRN [id number]   Admission Date 1/22/2019 Operation Date 1/22/2019   Attending Physician Kapil Ayala MD Operating Physician Santa Lees MD     Pre-Operative Diagnos visualizing it by echocardiography and fluoroscopy. The dilator   and sheath were gently advanced over the needle followed by removal of the  dilator and needle. The SL-1 sheath was appropriately flushed.    The SL-1 sheath was then exchanged over a long gu hemostasis. Complications:  None    ELECTROPHYSIOLOGY STUDY FINDINGS:   1. Sinus rhythm, cycle length 744 ms,   2.    ms, QRS 63 ms,  ms.   3.  AH 80 ms, HV 49 ms.   4.  Decremental pacing was performed from the proximal coronary sinus,   the

## 2019-01-23 VITALS
BODY MASS INDEX: 27.34 KG/M2 | RESPIRATION RATE: 16 BRPM | WEIGHT: 144.81 LBS | SYSTOLIC BLOOD PRESSURE: 102 MMHG | TEMPERATURE: 98 F | HEART RATE: 88 BPM | HEIGHT: 61 IN | OXYGEN SATURATION: 95 % | DIASTOLIC BLOOD PRESSURE: 55 MMHG

## 2019-01-23 LAB
ATRIAL RATE: 94 BPM
P AXIS: -24 DEGREES
P-R INTERVAL: 234 MS
Q-T INTERVAL: 358 MS
QRS DURATION: 76 MS
QTC CALCULATION (BEZET): 447 MS
R AXIS: -34 DEGREES
T AXIS: -23 DEGREES
VENTRICULAR RATE: 94 BPM

## 2019-01-23 PROCEDURE — 51701 INSERT BLADDER CATHETER: CPT

## 2019-01-23 RX ADMIN — POTASSIUM CHLORIDE 20 MEQ: 1.5 POWDER, FOR SOLUTION ORAL at 09:28:00

## 2019-01-23 RX ADMIN — ASPIRIN 81 MG: 81 TABLET ORAL at 09:28:00

## 2019-01-23 RX ADMIN — PANTOPRAZOLE SODIUM 40 MG: 40 TABLET, DELAYED RELEASE ORAL at 06:21:00

## 2019-01-23 RX ADMIN — METOPROLOL SUCCINATE 50 MG: 50 TABLET, EXTENDED RELEASE ORAL at 06:22:00

## 2019-01-23 RX ADMIN — LEVOTHYROXINE SODIUM 75 MCG: 0.07 TABLET ORAL at 06:22:00

## 2019-01-23 RX ADMIN — TORSEMIDE 20 MG: 20 TABLET ORAL at 09:28:00

## 2019-01-23 RX ADMIN — SODIUM CHLORIDE: 9 INJECTION, SOLUTION INTRAVENOUS at 06:24:00

## 2019-01-23 RX ADMIN — SODIUM CHLORIDE: 9 INJECTION, SOLUTION INTRAVENOUS at 02:14:00

## 2019-01-23 NOTE — PLAN OF CARE
D: Patient received orders for discharge    A: Reviewed post ablation discharge instructions, handout given; reviewed discharge instructions, including follow up appointments to be made; reviewed discharge medications, no tylenol; PIV removed; cardiac kesha

## 2019-01-23 NOTE — PLAN OF CARE
Patient admitted from cath lab, admission navigator and assessment completed in EMR; patient alert and oriented, lives in assisted living, her  lives on the dementia unit at the same facility, hard of hearing with hearing aids in her purse; lung wyatt

## 2019-01-23 NOTE — PROGRESS NOTES
[facility] Consult Follow Up Note      Alex Millan Patient Status:  Outpatient in a Bed    1940 MRN [id number]   [facility] 2NE-A Attending David Joseph MD   Hosp Day # 0 PCP [address]     Reason f bruit  Cardiac: RRR, no M/R/G  Lungs: CTAB  Abdomen: Soft, NT, ND, BS+  Extremities: Warm, well perfused, normal cap refill, no edema; bilateral groins soft without hematoma  Skin: Warm and dry.        Labs:     Recent Labs   Lab  01/22/19   1115   GLU  108

## 2019-01-23 NOTE — PLAN OF CARE
CARDIOVASCULAR - ADULT    • Maintains optimal cardiac output and hemodynamic stability Progressing    • Absence of cardiac arrhythmias or at baseline Progressing          Rcv'd A/o/3  [place]  Denies pain or discomfort  On tele with SR 1st degree AV  Lt/rt leif

## 2019-01-28 NOTE — ANESTHESIA POSTPROCEDURE EVALUATION
[address] [facility] Patient Status:  Outpatient in a Bed   Age/Gender 66year old female MRN [id number]   [facility] 2NE-A Attending No att. providers found   Hosp Day # 0 PCP [facility] DOUGLAS LAWLER       Anesthesia Post-op Note

## 2019-02-01 ENCOUNTER — PRIOR ORIGINAL RECORDS (OUTPATIENT)
Dept: OTHER | Age: 79
End: 2019-02-01

## 2019-02-01 ENCOUNTER — MYAURORA ACCOUNT LINK (OUTPATIENT)
Dept: OTHER | Age: 79
End: 2019-02-01

## 2019-02-05 LAB
BUN: 13 MG/DL
CALCIUM: 9.5 MG/DL
CHLORIDE: 106 MEQ/L
CREATININE, SERUM: 1.14 MG/DL
GLUCOSE: 108 MG/DL
POTASSIUM, SERUM: 4.1 MEQ/L
SODIUM: 138 MEQ/L

## 2019-02-22 ENCOUNTER — ORDER TRANSCRIPTION (OUTPATIENT)
Dept: ADMINISTRATIVE | Facility: HOSPITAL | Age: 79
End: 2019-02-22

## 2019-02-22 DIAGNOSIS — I48.91 ATRIAL FIBRILLATION, UNSPECIFIED TYPE (HCC): Primary | ICD-10-CM

## 2019-02-28 VITALS
SYSTOLIC BLOOD PRESSURE: 108 MMHG | HEART RATE: 78 BPM | DIASTOLIC BLOOD PRESSURE: 60 MMHG | BODY MASS INDEX: 29.45 KG/M2 | WEIGHT: 156 LBS | HEIGHT: 61 IN

## 2019-02-28 VITALS
SYSTOLIC BLOOD PRESSURE: 118 MMHG | HEIGHT: 61 IN | BODY MASS INDEX: 27.38 KG/M2 | DIASTOLIC BLOOD PRESSURE: 70 MMHG | WEIGHT: 145 LBS | HEART RATE: 81 BPM

## 2019-02-28 VITALS — SYSTOLIC BLOOD PRESSURE: 122 MMHG | WEIGHT: 145 LBS | DIASTOLIC BLOOD PRESSURE: 60 MMHG | HEART RATE: 80 BPM

## 2019-02-28 VITALS
DIASTOLIC BLOOD PRESSURE: 60 MMHG | HEART RATE: 60 BPM | SYSTOLIC BLOOD PRESSURE: 110 MMHG | HEIGHT: 61 IN | WEIGHT: 147 LBS | BODY MASS INDEX: 27.75 KG/M2

## 2019-02-28 VITALS
WEIGHT: 153 LBS | BODY MASS INDEX: 28.16 KG/M2 | SYSTOLIC BLOOD PRESSURE: 118 MMHG | HEART RATE: 76 BPM | DIASTOLIC BLOOD PRESSURE: 62 MMHG | HEIGHT: 62 IN

## 2019-02-28 VITALS
HEIGHT: 61 IN | BODY MASS INDEX: 29.64 KG/M2 | DIASTOLIC BLOOD PRESSURE: 60 MMHG | WEIGHT: 157 LBS | SYSTOLIC BLOOD PRESSURE: 100 MMHG | HEART RATE: 86 BPM

## 2019-03-01 VITALS
SYSTOLIC BLOOD PRESSURE: 128 MMHG | DIASTOLIC BLOOD PRESSURE: 70 MMHG | HEART RATE: 78 BPM | WEIGHT: 146 LBS | BODY MASS INDEX: 26.87 KG/M2 | HEIGHT: 62 IN

## 2019-03-01 VITALS
HEART RATE: 80 BPM | DIASTOLIC BLOOD PRESSURE: 62 MMHG | BODY MASS INDEX: 25.95 KG/M2 | SYSTOLIC BLOOD PRESSURE: 122 MMHG | HEIGHT: 62 IN | WEIGHT: 141 LBS

## 2019-03-12 ENCOUNTER — HOSPITAL ENCOUNTER (OUTPATIENT)
Dept: CV DIAGNOSTICS | Facility: HOSPITAL | Age: 79
Discharge: HOME | End: 2019-03-12
Attending: INTERNAL MEDICINE
Payer: MEDICARE

## 2019-03-12 ENCOUNTER — HOSPITAL ENCOUNTER (OUTPATIENT)
Dept: CT IMAGING | Facility: HOSPITAL | Age: 79
Discharge: HOME | End: 2019-03-12
Attending: INTERNAL MEDICINE
Payer: MEDICARE

## 2019-03-12 DIAGNOSIS — I48.91 ATRIAL FIBRILLATION, UNSPECIFIED TYPE (HCC): ICD-10-CM

## 2019-03-12 DIAGNOSIS — I48.19 ATRIAL FIBRILLATION, PERSISTENT (HCC): ICD-10-CM

## 2019-03-12 PROCEDURE — 93225 XTRNL ECG REC<48 HRS REC: CPT | Performed by: INTERNAL MEDICINE

## 2019-03-12 PROCEDURE — 93226 XTRNL ECG REC<48 HR SCAN A/R: CPT | Performed by: INTERNAL MEDICINE

## 2019-03-12 PROCEDURE — 93227 XTRNL ECG REC<48 HR R&I: CPT | Performed by: INTERNAL MEDICINE

## 2019-03-14 RX ORDER — POTASSIUM CHLORIDE 20 MEQ/1
TABLET, EXTENDED RELEASE ORAL
COMMUNITY
Start: 2018-12-24 | End: 2021-01-06 | Stop reason: DRUGHIGH

## 2019-03-14 RX ORDER — LEVOTHYROXINE SODIUM 0.07 MG/1
TABLET ORAL DAILY
COMMUNITY

## 2019-03-14 RX ORDER — ATORVASTATIN CALCIUM 20 MG/1
1 TABLET, FILM COATED ORAL AT BEDTIME
COMMUNITY
Start: 2016-04-06

## 2019-03-14 RX ORDER — METOPROLOL SUCCINATE 50 MG/1
TABLET, EXTENDED RELEASE ORAL
COMMUNITY
End: 2022-04-15 | Stop reason: SDUPTHER

## 2019-03-14 RX ORDER — TORSEMIDE 20 MG/1
TABLET ORAL
COMMUNITY
Start: 2018-12-06 | End: 2021-01-06 | Stop reason: DRUGHIGH

## 2019-03-14 RX ORDER — OMEPRAZOLE 20 MG/1
CAPSULE, DELAYED RELEASE ORAL DAILY
COMMUNITY
End: 2020-06-02 | Stop reason: ALTCHOICE

## 2019-03-14 RX ORDER — FERROUS SULFATE 325(65) MG
TABLET ORAL
COMMUNITY
Start: 2017-03-03

## 2019-03-14 RX ORDER — MULTIVITAMIN
CAPSULE ORAL
COMMUNITY
End: 2020-06-02 | Stop reason: ALTCHOICE

## 2019-04-04 ENCOUNTER — OFFICE VISIT (OUTPATIENT)
Dept: CARDIOLOGY | Age: 79
End: 2019-04-04

## 2019-04-04 VITALS
DIASTOLIC BLOOD PRESSURE: 64 MMHG | SYSTOLIC BLOOD PRESSURE: 126 MMHG | WEIGHT: 146 LBS | HEART RATE: 80 BPM | HEIGHT: 61 IN | BODY MASS INDEX: 27.56 KG/M2

## 2019-04-04 DIAGNOSIS — I48.0 PAF (PAROXYSMAL ATRIAL FIBRILLATION) (CMD): Primary | ICD-10-CM

## 2019-04-04 PROCEDURE — 99215 OFFICE O/P EST HI 40 MIN: CPT | Performed by: INTERNAL MEDICINE

## 2019-04-04 SDOH — HEALTH STABILITY: PHYSICAL HEALTH: ON AVERAGE, HOW MANY DAYS PER WEEK DO YOU ENGAGE IN MODERATE TO STRENUOUS EXERCISE (LIKE A BRISK WALK)?: 7 DAYS

## 2019-04-04 SDOH — HEALTH STABILITY: PHYSICAL HEALTH: ON AVERAGE, HOW MANY MINUTES DO YOU ENGAGE IN EXERCISE AT THIS LEVEL?: 30 MIN

## 2019-04-04 ASSESSMENT — ENCOUNTER SYMPTOMS
FEVER: 0
ALLERGIC/IMMUNOLOGIC COMMENTS: NO NEW FOOD ALLERGIES
CHILLS: 0
SUSPICIOUS LESIONS: 0
HEMATOCHEZIA: 0
COUGH: 0
WEIGHT GAIN: 0
BRUISES/BLEEDS EASILY: 0
HEMOPTYSIS: 0
WEIGHT LOSS: 0

## 2019-05-20 NOTE — H&P
[facility]    PATIENT'S NAME: Toyinon Dayanna   ATTENDING PHYSICIAN: Charli Marroquin M.D.    PATIENT ACCOUNT#:   [de-identified]    LOCATION:  [address] [place] [address]  MEDICAL RECORD #:   [id number]       YOB: 1940  ADMISSION DATE:       10/09/2018

## 2019-06-27 ENCOUNTER — OFFICE VISIT (OUTPATIENT)
Dept: CARDIOLOGY | Age: 79
End: 2019-06-27

## 2019-06-27 VITALS
HEIGHT: 61 IN | BODY MASS INDEX: 28.51 KG/M2 | DIASTOLIC BLOOD PRESSURE: 60 MMHG | SYSTOLIC BLOOD PRESSURE: 112 MMHG | HEART RATE: 82 BPM | WEIGHT: 151 LBS

## 2019-06-27 DIAGNOSIS — Z86.79 ATRIAL FIBRILLATION, CURRENTLY IN SINUS RHYTHM: ICD-10-CM

## 2019-06-27 DIAGNOSIS — E78.00 PURE HYPERCHOLESTEROLEMIA: ICD-10-CM

## 2019-06-27 DIAGNOSIS — I25.10 CORONARY ARTERY DISEASE INVOLVING NATIVE CORONARY ARTERY OF NATIVE HEART WITHOUT ANGINA PECTORIS: Primary | ICD-10-CM

## 2019-06-27 PROBLEM — I48.19 PERSISTENT ATRIAL FIBRILLATION (CMD): Status: ACTIVE | Noted: 2018-11-27

## 2019-06-27 PROBLEM — Z95.1 HX OF CABG: Status: ACTIVE | Noted: 2018-10-23

## 2019-06-27 PROCEDURE — 99214 OFFICE O/P EST MOD 30 MIN: CPT | Performed by: NURSE PRACTITIONER

## 2019-06-27 PROCEDURE — 93000 ELECTROCARDIOGRAM COMPLETE: CPT | Performed by: NURSE PRACTITIONER

## 2019-07-05 ENCOUNTER — LAB ENCOUNTER (OUTPATIENT)
Dept: LAB | Age: 79
End: 2019-07-05
Attending: INTERNAL MEDICINE
Payer: MEDICARE

## 2019-07-05 DIAGNOSIS — E78.00 PURE HYPERCHOLESTEROLEMIA: Primary | ICD-10-CM

## 2019-07-05 LAB
ALBUMIN SERPL-MCNC: 3.9 G/DL (ref 3.4–5)
ALBUMIN/GLOB SERPL: 1 {RATIO} (ref 1–2)
ALP LIVER SERPL-CCNC: 147 U/L (ref 55–142)
ALT SERPL-CCNC: 14 U/L (ref 13–56)
ANION GAP SERPL CALC-SCNC: 4 MMOL/L (ref 0–18)
AST SERPL-CCNC: 20 U/L (ref 15–37)
BILIRUB SERPL-MCNC: 1 MG/DL (ref 0.1–2)
BUN BLD-MCNC: 24 MG/DL (ref 7–18)
BUN/CREAT SERPL: 19.2 (ref 10–20)
CALCIUM BLD-MCNC: 9.5 MG/DL (ref 8.5–10.1)
CHLORIDE SERPL-SCNC: 107 MMOL/L (ref 98–112)
CHOLEST SMN-MCNC: 143 MG/DL (ref ?–200)
CO2 SERPL-SCNC: 30 MMOL/L (ref 21–32)
CREAT BLD-MCNC: 1.25 MG/DL (ref 0.55–1.02)
GLOBULIN PLAS-MCNC: 3.8 G/DL (ref 2.8–4.4)
GLUCOSE BLD-MCNC: 98 MG/DL (ref 70–99)
HDLC SERPL-MCNC: 53 MG/DL (ref 40–59)
LDLC SERPL CALC-MCNC: 70 MG/DL (ref ?–100)
M PROTEIN MFR SERPL ELPH: 7.7 G/DL (ref 6.4–8.2)
NONHDLC SERPL-MCNC: 90 MG/DL (ref ?–130)
OSMOLALITY SERPL CALC.SUM OF ELEC: 296 MOSM/KG (ref 275–295)
POTASSIUM SERPL-SCNC: 4.4 MMOL/L (ref 3.5–5.1)
SODIUM SERPL-SCNC: 141 MMOL/L (ref 136–145)
TRIGL SERPL-MCNC: 102 MG/DL (ref 30–149)
VLDLC SERPL CALC-MCNC: 20 MG/DL (ref 0–30)

## 2019-07-05 PROCEDURE — 80061 LIPID PANEL: CPT

## 2019-07-05 PROCEDURE — 36415 COLL VENOUS BLD VENIPUNCTURE: CPT

## 2019-07-05 PROCEDURE — 80053 COMPREHEN METABOLIC PANEL: CPT

## 2019-07-08 ENCOUNTER — TELEPHONE (OUTPATIENT)
Dept: CARDIOLOGY | Age: 79
End: 2019-07-08

## 2019-07-25 PROBLEM — Z86.79 ATRIAL FIBRILLATION, CURRENTLY IN SINUS RHYTHM: Status: ACTIVE | Noted: 2018-11-27

## 2019-09-17 PROBLEM — M15.9 OSTEOARTHRITIS OF MULTIPLE JOINTS: Status: ACTIVE | Noted: 2019-09-17

## 2019-10-04 ENCOUNTER — OFFICE VISIT (OUTPATIENT)
Dept: CARDIOLOGY | Age: 79
End: 2019-10-04

## 2019-10-04 VITALS
WEIGHT: 151.1 LBS | SYSTOLIC BLOOD PRESSURE: 130 MMHG | DIASTOLIC BLOOD PRESSURE: 76 MMHG | BODY MASS INDEX: 28.53 KG/M2 | HEART RATE: 82 BPM | HEIGHT: 61 IN

## 2019-10-04 DIAGNOSIS — I48.0 PAF (PAROXYSMAL ATRIAL FIBRILLATION) (CMD): Primary | ICD-10-CM

## 2019-10-04 PROCEDURE — 99215 OFFICE O/P EST HI 40 MIN: CPT | Performed by: INTERNAL MEDICINE

## 2019-10-04 ASSESSMENT — ENCOUNTER SYMPTOMS
WEIGHT GAIN: 0
WEIGHT LOSS: 0
FEVER: 0
HEMATOCHEZIA: 0
SUSPICIOUS LESIONS: 0
HEMOPTYSIS: 0
BRUISES/BLEEDS EASILY: 0
ALLERGIC/IMMUNOLOGIC COMMENTS: NO NEW FOOD ALLERGIES
CHILLS: 0
COUGH: 0

## 2019-11-15 ENCOUNTER — HOSPITAL ENCOUNTER (OUTPATIENT)
Dept: MAMMOGRAPHY | Age: 79
Discharge: HOME | End: 2019-11-15
Attending: INTERNAL MEDICINE
Payer: MEDICARE

## 2019-11-15 DIAGNOSIS — Z12.31 ENCOUNTER FOR SCREENING MAMMOGRAM FOR MALIGNANT NEOPLASM OF BREAST: ICD-10-CM

## 2019-11-15 PROCEDURE — 77063 BREAST TOMOSYNTHESIS BI: CPT | Performed by: INTERNAL MEDICINE

## 2019-11-15 PROCEDURE — 77067 SCR MAMMO BI INCL CAD: CPT | Performed by: INTERNAL MEDICINE

## 2019-12-19 ENCOUNTER — APPOINTMENT (OUTPATIENT)
Dept: CT IMAGING | Facility: HOSPITAL | Age: 79
End: 2019-12-19
Attending: EMERGENCY MEDICINE
Payer: MEDICARE

## 2019-12-19 ENCOUNTER — HOSPITAL ENCOUNTER (EMERGENCY)
Facility: HOSPITAL | Age: 79
Discharge: HOME | End: 2019-12-19
Attending: EMERGENCY MEDICINE
Payer: MEDICARE

## 2019-12-19 VITALS
WEIGHT: 149.06 LBS | OXYGEN SATURATION: 97 % | HEIGHT: 60.98 IN | SYSTOLIC BLOOD PRESSURE: 130 MMHG | HEART RATE: 80 BPM | RESPIRATION RATE: 18 BRPM | DIASTOLIC BLOOD PRESSURE: 67 MMHG | BODY MASS INDEX: 28.14 KG/M2

## 2019-12-19 DIAGNOSIS — K52.9 ACUTE COLITIS: Primary | ICD-10-CM

## 2019-12-19 PROCEDURE — 96360 HYDRATION IV INFUSION INIT: CPT

## 2019-12-19 PROCEDURE — 83690 ASSAY OF LIPASE: CPT | Performed by: EMERGENCY MEDICINE

## 2019-12-19 PROCEDURE — 80053 COMPREHEN METABOLIC PANEL: CPT | Performed by: EMERGENCY MEDICINE

## 2019-12-19 PROCEDURE — 99284 EMERGENCY DEPT VISIT MOD MDM: CPT

## 2019-12-19 PROCEDURE — 96361 HYDRATE IV INFUSION ADD-ON: CPT

## 2019-12-19 PROCEDURE — 74177 CT ABD & PELVIS W/CONTRAST: CPT | Performed by: EMERGENCY MEDICINE

## 2019-12-19 PROCEDURE — 85025 COMPLETE CBC W/AUTO DIFF WBC: CPT | Performed by: EMERGENCY MEDICINE

## 2019-12-19 RX ORDER — DICYCLOMINE HCL 20 MG
20 TABLET ORAL 3 TIMES DAILY
Qty: 20 TABLET | Refills: 0 | Status: SHIPPED | OUTPATIENT
Start: 2019-12-19 | End: 2020-01-28

## 2019-12-19 RX ORDER — DICYCLOMINE HCL 20 MG
20 TABLET ORAL 3 TIMES DAILY
Qty: 20 TABLET | Refills: 0 | Status: SHIPPED | OUTPATIENT
Start: 2019-12-19 | End: 2019-12-19

## 2019-12-19 NOTE — ED PROVIDER NOTES
Patient Seen in: [facility] Emergency Department      History   Patient presents with:  Nausea/Vomiting/Diarrhea  Abdomen/Flank Pain    Stated Complaint: diarrhea x 2 months, worse since last night, abdominal pain    HPI    Patient is a 70-year-old • ABDOMINAL AORTIC ANEURYSM ENDOSCOPIC N/A 6/6/2016    Performed by Alfredo Schmitz MD at [address] [facility]  2/3/2017   • ANGIOPLASTY (CORONARY)  2010    stent   • BYPASS SURGERY  2/6/2017    CABG x 3   • CABG  2/6/17   • CARDIOVERSION  6/2016    pt Exam    GENERAL: No acute distress, well appearing and non-toxic, Alert and oriented X 3   HEENT: Normocephalic, atraumatic. Moist mucous membranes.   Pupils equal round reactive to light and accommodation, extraocular motion is intact, sclerae white, conj Please view results for these tests on the individual orders.    RAINBOW DRAW BLUE   RAINBOW DRAW LAVENDER   RAINBOW DRAW LIGHT GREEN   RAINBOW DRAW GOLD          Ct Abdomen Pelvis Iv Contrast, No Oral (er)    Result Date: 12/19/2019  PROCEDURE: evidence of abscess or free air. The small bowel is unremarkable. The appendix is normal.  The stomach is grossly unremarkable. ABDOMINAL WALL:  No mass or hernia. URINARY BLADDER:  No visible focal wall thickening, lesion, or calculus.   PELVIC NODES: evaluation and treatment, but to return immediately to the ER for worsening, concerning, new, changing or persisting symptoms. I discussed the case with the patient and they had no questions, complaints, or concerns. Patient felt comfortable going home.

## 2020-01-01 ENCOUNTER — EXTERNAL RECORD (OUTPATIENT)
Dept: HEALTH INFORMATION MANAGEMENT | Facility: OTHER | Age: 80
End: 2020-01-01

## 2020-02-12 NOTE — H&P (VIEW-ONLY)
[facility] - History and Physical                                                                                         Patient presents with:  Er F/u: Abnormal Imaging GI Tract      HISTORY OF PRESENT ILLNESS:   Sky Caceres is at present time   • Unspecified essential hypertension       Past Surgical History:   Procedure Laterality Date   • ABDOMINAL AORTIC ANEURYSM ENDOSCOPIC N/A 6/6/2016    Performed by Iris Hansen MD at [facility]  2/3/2017   • ANGIOPLASTY (COR Medications   Medication Sig Dispense Refill   • Levothyroxine Sodium 50 MCG Oral Tab Take 1 tablet (50 mcg total) by mouth before breakfast. 30 tablet 0   • Cholecalciferol (VITAMIN D3) 2000 units Oral Tab Take 1 capsule by mouth daily.      • Potassium Ch PHYSICAL EXAM:   Blood pressure 123/70, pulse 85, temperature 97 °F (36.1 °C), temperature source Oral, height 61\", weight 152 lb 12.8 oz (69.3 kg).     Constitutional: Normal body habitus, well groomed  Ears, Nose, Mouth, Throat: Normal appearance of nose

## 2020-02-13 ENCOUNTER — TELEPHONE (OUTPATIENT)
Dept: CARDIOLOGY | Age: 80
End: 2020-02-13

## 2020-03-12 ENCOUNTER — ANESTHESIA EVENT (OUTPATIENT)
Dept: ENDOSCOPY | Facility: HOSPITAL | Age: 80
End: 2020-03-12
Payer: MEDICARE

## 2020-03-13 ENCOUNTER — HOSPITAL ENCOUNTER (OUTPATIENT)
Facility: HOSPITAL | Age: 80
Setting detail: HOSPITAL OUTPATIENT SURGERY
Discharge: HOME | End: 2020-03-13
Attending: INTERNAL MEDICINE | Admitting: INTERNAL MEDICINE
Payer: MEDICARE

## 2020-03-13 ENCOUNTER — ANESTHESIA (OUTPATIENT)
Dept: ENDOSCOPY | Facility: HOSPITAL | Age: 80
End: 2020-03-13
Payer: MEDICARE

## 2020-03-13 VITALS
DIASTOLIC BLOOD PRESSURE: 57 MMHG | SYSTOLIC BLOOD PRESSURE: 112 MMHG | TEMPERATURE: 99 F | RESPIRATION RATE: 18 BRPM | HEART RATE: 61 BPM | BODY MASS INDEX: 28.7 KG/M2 | WEIGHT: 152 LBS | OXYGEN SATURATION: 96 % | HEIGHT: 61 IN

## 2020-03-13 DIAGNOSIS — D64.9 ANEMIA, UNSPECIFIED TYPE: ICD-10-CM

## 2020-03-13 DIAGNOSIS — R93.3 ABNORMAL FINDING ON GI TRACT IMAGING: ICD-10-CM

## 2020-03-13 PROCEDURE — 88305 TISSUE EXAM BY PATHOLOGIST: CPT | Performed by: INTERNAL MEDICINE

## 2020-03-13 PROCEDURE — 0DB68ZX EXCISION OF STOMACH, VIA NATURAL OR ARTIFICIAL OPENING ENDOSCOPIC, DIAGNOSTIC: ICD-10-PCS | Performed by: INTERNAL MEDICINE

## 2020-03-13 PROCEDURE — 0DB98ZX EXCISION OF DUODENUM, VIA NATURAL OR ARTIFICIAL OPENING ENDOSCOPIC, DIAGNOSTIC: ICD-10-PCS | Performed by: INTERNAL MEDICINE

## 2020-03-13 PROCEDURE — 0DJD8ZZ INSPECTION OF LOWER INTESTINAL TRACT, VIA NATURAL OR ARTIFICIAL OPENING ENDOSCOPIC: ICD-10-PCS | Performed by: INTERNAL MEDICINE

## 2020-03-13 RX ORDER — HYDROMORPHONE HYDROCHLORIDE 1 MG/ML
0.4 INJECTION, SOLUTION INTRAMUSCULAR; INTRAVENOUS; SUBCUTANEOUS EVERY 5 MIN PRN
Status: DISCONTINUED | OUTPATIENT
Start: 2020-03-13 | End: 2020-03-13

## 2020-03-13 RX ORDER — ONDANSETRON 2 MG/ML
4 INJECTION INTRAMUSCULAR; INTRAVENOUS AS NEEDED
Status: DISCONTINUED | OUTPATIENT
Start: 2020-03-13 | End: 2020-03-13

## 2020-03-13 RX ORDER — SODIUM CHLORIDE, SODIUM LACTATE, POTASSIUM CHLORIDE, CALCIUM CHLORIDE 600; 310; 30; 20 MG/100ML; MG/100ML; MG/100ML; MG/100ML
INJECTION, SOLUTION INTRAVENOUS CONTINUOUS
Status: DISCONTINUED | OUTPATIENT
Start: 2020-03-13 | End: 2020-03-13

## 2020-03-13 RX ORDER — HYDROCODONE BITARTRATE AND ACETAMINOPHEN 10; 325 MG/1; MG/1
2 TABLET ORAL AS NEEDED
Status: DISCONTINUED | OUTPATIENT
Start: 2020-03-13 | End: 2020-03-13

## 2020-03-13 RX ORDER — NALOXONE HYDROCHLORIDE 0.4 MG/ML
80 INJECTION, SOLUTION INTRAMUSCULAR; INTRAVENOUS; SUBCUTANEOUS AS NEEDED
Status: DISCONTINUED | OUTPATIENT
Start: 2020-03-13 | End: 2020-03-13

## 2020-03-13 RX ORDER — HYDROCODONE BITARTRATE AND ACETAMINOPHEN 10; 325 MG/1; MG/1
1 TABLET ORAL AS NEEDED
Status: DISCONTINUED | OUTPATIENT
Start: 2020-03-13 | End: 2020-03-13

## 2020-03-13 RX ORDER — METOCLOPRAMIDE HYDROCHLORIDE 5 MG/ML
10 INJECTION INTRAMUSCULAR; INTRAVENOUS AS NEEDED
Status: DISCONTINUED | OUTPATIENT
Start: 2020-03-13 | End: 2020-03-13

## 2020-03-13 RX ORDER — LIDOCAINE HYDROCHLORIDE 10 MG/ML
INJECTION, SOLUTION EPIDURAL; INFILTRATION; INTRACAUDAL; PERINEURAL AS NEEDED
Status: DISCONTINUED | OUTPATIENT
Start: 2020-03-13 | End: 2020-03-13 | Stop reason: SURG

## 2020-03-13 RX ADMIN — LIDOCAINE HYDROCHLORIDE 25 MG: 10 INJECTION, SOLUTION EPIDURAL; INFILTRATION; INTRACAUDAL; PERINEURAL at 08:25:00

## 2020-03-13 RX ADMIN — SODIUM CHLORIDE, SODIUM LACTATE, POTASSIUM CHLORIDE, CALCIUM CHLORIDE: 600; 310; 30; 20 INJECTION, SOLUTION INTRAVENOUS at 08:44:00

## 2020-03-13 NOTE — OPERATIVE REPORT
Franco Larios Patient Status:  Hospital Outpatient Surgery    1940 MRN [id number]   [facility] ENDOSCOPY Attending Rubén Garcia, DO   Hosp Day # 0 PCP Kacie Atkinson MD       PREOPERATIVE DIAGNOSIS/INDICATION: Iron Defi results.        Анна Kimball  Gastroenterology/Advanced Endoscopy  [facility]

## 2020-03-13 NOTE — ANESTHESIA POSTPROCEDURE EVALUATION
[address] [facility] Patient Status:  Hospital Outpatient Surgery   Age/Gender 78year old female MRN [id number]   Location [address] [facility]. Attending Teresa Patel DO   Hosp Day # 0 PCP Jeremias Kwok MD       Anesthesia Post-op

## 2020-03-13 NOTE — OPERATIVE REPORT
Joellen Early Patient Status:  Hospital Outpatient Surgery    1940 MRN [id number]   [facility] ENDOSCOPY Attending Rolo Harper,    Hosp Day # 0 PCP Alice Patrick MD       PREOPERATIVE DIAGNOSIS/INDICATION: Iron Defici normal.  Transverse colon: The mucosa and vascular pattern were normal.  Descending colon: The mucosa and vascular pattern were normal.  Sigmoid colon: The mucosa and vascular pattern were normal.  Rectum:  The mucosa and vascular pattern were normal. Retro

## 2020-03-13 NOTE — ANESTHESIA PREPROCEDURE EVALUATION
PRE-OP EVALUATION    Patient Name: Marquis Kingsley    Pre-op Diagnosis: Anemia, unspecified type [D64.9]  Abnormal finding on GI tract imaging [R93.3]    Procedure(s):  ESOPHAGOGASTRODUODENOSCOPY, COLONOSCOPY      Surgeon(s) and Role:     * Veronica  Complications           GI/Hepatic/Renal                                 Cardiovascular                (+) obesity  (+) hypertension     (+) CAD             (+) dysrhythmias                   Endo/Other                                  Pulmonary        (+) 12/19/2019    RBC 3.84 12/19/2019    HGB 10.8 (L) 12/19/2019    HCT 34.1 (L) 12/19/2019    MCV 88.8 12/19/2019    MCH 28.1 12/19/2019    MCHC 31.7 12/19/2019    RDW 17.1 (H) 12/19/2019    .0 12/19/2019     Lab Results   Component Value Date    NA 13

## 2020-03-13 NOTE — INTERVAL H&P NOTE
Pre-op Diagnosis: Anemia, unspecified type [D64.9]  Abnormal finding on GI tract imaging [R93.3]    The above referenced H&P was reviewed by Geovanny Vargas DO on 3/13/2020, the patient was examined and no significant changes have occurred in the patient'

## 2020-04-17 ENCOUNTER — APPOINTMENT (OUTPATIENT)
Dept: CARDIOLOGY | Age: 80
End: 2020-04-17

## 2020-04-23 ENCOUNTER — OFFICE VISIT (OUTPATIENT)
Dept: CARDIOLOGY | Age: 80
End: 2020-04-23

## 2020-04-23 VITALS — WEIGHT: 153 LBS | BODY MASS INDEX: 28.91 KG/M2

## 2020-04-23 DIAGNOSIS — I48.0 PAROXYSMAL ATRIAL FIBRILLATION (CMD): Primary | ICD-10-CM

## 2020-04-23 PROCEDURE — 99441 TELEPHONE E&M BY PHYSICIAN EST PT NOT ORIG PREV 7 DAYS 5-10 MIN: CPT | Performed by: INTERNAL MEDICINE

## 2020-05-18 ENCOUNTER — APPOINTMENT (OUTPATIENT)
Dept: CT IMAGING | Facility: HOSPITAL | Age: 80
End: 2020-05-18
Attending: EMERGENCY MEDICINE
Payer: MEDICARE

## 2020-05-18 ENCOUNTER — HOSPITAL ENCOUNTER (EMERGENCY)
Facility: HOSPITAL | Age: 80
Discharge: NURSING, REHAB OR LONG-TERM CARE | End: 2020-05-18
Attending: EMERGENCY MEDICINE
Payer: MEDICARE

## 2020-05-18 VITALS
HEART RATE: 79 BPM | OXYGEN SATURATION: 97 % | BODY MASS INDEX: 28.13 KG/M2 | DIASTOLIC BLOOD PRESSURE: 63 MMHG | HEIGHT: 61 IN | TEMPERATURE: 98 F | RESPIRATION RATE: 16 BRPM | WEIGHT: 149 LBS | SYSTOLIC BLOOD PRESSURE: 114 MMHG

## 2020-05-18 DIAGNOSIS — K52.9 COLITIS: Primary | ICD-10-CM

## 2020-05-18 PROCEDURE — 80053 COMPREHEN METABOLIC PANEL: CPT | Performed by: EMERGENCY MEDICINE

## 2020-05-18 PROCEDURE — 99284 EMERGENCY DEPT VISIT MOD MDM: CPT

## 2020-05-18 PROCEDURE — 85025 COMPLETE CBC W/AUTO DIFF WBC: CPT | Performed by: EMERGENCY MEDICINE

## 2020-05-18 PROCEDURE — 36415 COLL VENOUS BLD VENIPUNCTURE: CPT

## 2020-05-18 PROCEDURE — 99285 EMERGENCY DEPT VISIT HI MDM: CPT

## 2020-05-18 PROCEDURE — 74176 CT ABD & PELVIS W/O CONTRAST: CPT | Performed by: EMERGENCY MEDICINE

## 2020-05-18 PROCEDURE — 83690 ASSAY OF LIPASE: CPT | Performed by: EMERGENCY MEDICINE

## 2020-05-18 RX ORDER — METRONIDAZOLE 500 MG/1
500 TABLET ORAL 3 TIMES DAILY
Qty: 30 TABLET | Refills: 0 | Status: SHIPPED | OUTPATIENT
Start: 2020-05-18 | End: 2020-05-28

## 2020-05-18 RX ORDER — METRONIDAZOLE 500 MG/1
500 TABLET ORAL ONCE
Status: DISCONTINUED | OUTPATIENT
Start: 2020-05-18 | End: 2020-05-18

## 2020-05-18 NOTE — ED NOTES
Pt given stool containers that are labeled with the paper orders and instructions to send samples to outpatient lab. Pt states, \"I understand. \" Anand Jump ontinue to monitor until medicar gets here shortness of breath

## 2020-05-18 NOTE — ED PROVIDER NOTES
Patient Seen in: [facility] Emergency Department      History   Patient presents with:  Abdomen/Flank Pain  Nausea/Vomiting/Diarrhea    Stated Complaint: abdominal pain , + diarrhea    HPI    Patient is a pleasant 66-year-old female who presents fro 3   • CABG  2/6/17    x3   • CARDIOVERSION  6/2016    pt thinks she had a cardioversion s/p aneurysm repair   • CATARACT     • CATH BARE METAL STENT (BMS)     • COLONOSCOPY     • COLONOSCOPY N/A 3/13/2020    Performed by Rubén Garcia DO at [facility] ENDOSCOP Wt 67.6 kg   SpO2 98%   BMI 28.15 kg/m²         Physical Exam  Vitals signs and nursing note reviewed. Constitutional:       Appearance: She is well-developed. HENT:      Head: Normocephalic and atraumatic.    Eyes:      Conjunctiva/sclera: Conjunctiv Final result                 Please view results for these tests on the individual orders.    URINALYSIS WITH CULTURE REFLEX   RAINBOW DRAW BLUE   RAINBOW DRAW LAVENDER   RAINBOW DRAW LIGHT GREEN   RAINBOW DRAW GOLD   C. DIFFICILE(TOXIGENIC)PCR   STOOL CULT

## 2020-05-18 NOTE — ED NOTES
The patient is unable to give us a stool sample at this present time. Stool sample kit was given to the patient I discussed with her to start antibiotics after getting a stool sample. The stool sample will be sent to the lab.

## 2020-05-19 PROBLEM — N18.9 CHRONIC KIDNEY DISEASE: Status: ACTIVE | Noted: 2020-05-19

## 2020-06-11 ENCOUNTER — TELEPHONE (OUTPATIENT)
Dept: CARDIOLOGY | Age: 80
End: 2020-06-11

## 2020-06-15 ENCOUNTER — APPOINTMENT (OUTPATIENT)
Dept: CARDIOLOGY | Age: 80
End: 2020-06-15

## 2020-06-16 ENCOUNTER — OFFICE VISIT (OUTPATIENT)
Dept: CARDIOLOGY | Age: 80
End: 2020-06-16

## 2020-06-16 VITALS — HEIGHT: 61 IN | WEIGHT: 152 LBS | BODY MASS INDEX: 28.7 KG/M2

## 2020-06-16 SDOH — HEALTH STABILITY: PHYSICAL HEALTH: ON AVERAGE, HOW MANY DAYS PER WEEK DO YOU ENGAGE IN MODERATE TO STRENUOUS EXERCISE (LIKE A BRISK WALK)?: 3 DAYS

## 2020-06-16 SDOH — HEALTH STABILITY: PHYSICAL HEALTH: ON AVERAGE, HOW MANY MINUTES DO YOU ENGAGE IN EXERCISE AT THIS LEVEL?: 30 MIN

## 2020-06-17 ENCOUNTER — TELEPHONE (OUTPATIENT)
Dept: CARDIOLOGY | Age: 80
End: 2020-06-17

## 2020-06-29 ENCOUNTER — OFFICE VISIT (OUTPATIENT)
Dept: CARDIOLOGY | Age: 80
End: 2020-06-29

## 2020-06-29 VITALS — BODY MASS INDEX: 28.89 KG/M2 | WEIGHT: 153 LBS | HEIGHT: 61 IN

## 2020-06-29 DIAGNOSIS — N18.30 STAGE 3 CHRONIC KIDNEY DISEASE (CMD): ICD-10-CM

## 2020-06-29 DIAGNOSIS — Z95.828 HISTORY OF ENDOVASCULAR STENT GRAFT FOR ABDOMINAL AORTIC ANEURYSM (AAA): ICD-10-CM

## 2020-06-29 DIAGNOSIS — E78.00 PURE HYPERCHOLESTEROLEMIA: ICD-10-CM

## 2020-06-29 DIAGNOSIS — Z86.79 ATRIAL FIBRILLATION, CURRENTLY IN SINUS RHYTHM: ICD-10-CM

## 2020-06-29 DIAGNOSIS — Z95.1 HX OF CABG: ICD-10-CM

## 2020-06-29 DIAGNOSIS — I25.10 CORONARY ARTERY DISEASE INVOLVING NATIVE CORONARY ARTERY OF NATIVE HEART WITHOUT ANGINA PECTORIS: Primary | ICD-10-CM

## 2020-06-29 PROBLEM — N18.9 CHRONIC KIDNEY DISEASE: Status: ACTIVE | Noted: 2020-05-19

## 2020-06-29 PROBLEM — M15.9 OSTEOARTHRITIS OF MULTIPLE JOINTS: Status: ACTIVE | Noted: 2019-09-17

## 2020-06-29 PROCEDURE — 99442 TELEPHONE E&M BY PHYSICIAN EST PT NOT ORIG PREV 7 DAYS 11-20 MIN: CPT | Performed by: INTERNAL MEDICINE

## 2020-06-29 SDOH — HEALTH STABILITY: PHYSICAL HEALTH: ON AVERAGE, HOW MANY MINUTES DO YOU ENGAGE IN EXERCISE AT THIS LEVEL?: 60 MIN

## 2020-06-29 SDOH — HEALTH STABILITY: PHYSICAL HEALTH: ON AVERAGE, HOW MANY DAYS PER WEEK DO YOU ENGAGE IN MODERATE TO STRENUOUS EXERCISE (LIKE A BRISK WALK)?: 3 DAYS

## 2020-06-29 ASSESSMENT — ENCOUNTER SYMPTOMS
HEMOPTYSIS: 0
FEVER: 0
ALLERGIC/IMMUNOLOGIC COMMENTS: NO NEW FOOD ALLERGIES
SUSPICIOUS LESIONS: 0
HEMATOCHEZIA: 0
CHILLS: 0
WEIGHT LOSS: 0
COUGH: 0
WEIGHT GAIN: 0
BRUISES/BLEEDS EASILY: 0

## 2020-06-29 ASSESSMENT — PATIENT HEALTH QUESTIONNAIRE - PHQ9
SUM OF ALL RESPONSES TO PHQ9 QUESTIONS 1 AND 2: 0
1. LITTLE INTEREST OR PLEASURE IN DOING THINGS: NOT AT ALL
CLINICAL INTERPRETATION OF PHQ2 SCORE: NO FURTHER SCREENING NEEDED
2. FEELING DOWN, DEPRESSED OR HOPELESS: NOT AT ALL
SUM OF ALL RESPONSES TO PHQ9 QUESTIONS 1 AND 2: 0
CLINICAL INTERPRETATION OF PHQ9 SCORE: NO FURTHER SCREENING NEEDED

## 2020-07-14 ENCOUNTER — TELEPHONE (OUTPATIENT)
Dept: CARDIOLOGY | Age: 80
End: 2020-07-14

## 2020-07-15 ENCOUNTER — TELEPHONE (OUTPATIENT)
Dept: CARDIOLOGY | Age: 80
End: 2020-07-15

## 2020-07-24 ENCOUNTER — DOCUMENTATION (OUTPATIENT)
Dept: CARDIOLOGY | Age: 80
End: 2020-07-24

## 2020-08-05 NOTE — OCCUPATIONAL THERAPY NOTE
OCCUPATIONAL THERAPY TREATMENT NOTE - INPATIENT     Room Number: 7246/8334-Z  Session: 3  Number of Visits to Meet Established Goals: 5    Presenting Problem: CABG    History related to current admission: Pt is s/p CABG    Problem List  Principal Problem: Taurus Bruner faxed reporting resident is having small clots now and then in urine. Lower back is a little sore and burns with urination at times.  UA C/S obtained Restriction: None      PAIN ASSESSMENT  Ratin  Location: chest  Management Techniques: Repositioning; Body mechanics     ACTIVITY TOLERANCE  Room air  No shortness of breath    ACTIVITIES OF DAILY LIVING ASSESSMENT  AM-PAC ‘6-Clicks’ Inpatient Daily Act knowledge of precautions, safety, and pain. These deficits continue manifest functionally while performing mobility and self-care.    The patient is below baseline and would benefit from skilled inpatient OT to address the above deficits, maximizing patien

## 2020-10-28 ENCOUNTER — ORDER TRANSCRIPTION (OUTPATIENT)
Dept: ADMINISTRATIVE | Facility: HOSPITAL | Age: 80
End: 2020-10-28

## 2020-10-28 DIAGNOSIS — Z12.31 ENCOUNTER FOR SCREENING MAMMOGRAM FOR MALIGNANT NEOPLASM OF BREAST: Primary | ICD-10-CM

## 2020-10-30 ENCOUNTER — OFFICE VISIT (OUTPATIENT)
Dept: CARDIOLOGY | Age: 80
End: 2020-10-30

## 2020-10-30 VITALS
BODY MASS INDEX: 28.53 KG/M2 | SYSTOLIC BLOOD PRESSURE: 110 MMHG | DIASTOLIC BLOOD PRESSURE: 70 MMHG | HEART RATE: 80 BPM | WEIGHT: 151 LBS

## 2020-10-30 DIAGNOSIS — I48.0 PAROXYSMAL ATRIAL FIBRILLATION (CMD): Primary | ICD-10-CM

## 2020-10-30 PROCEDURE — 99215 OFFICE O/P EST HI 40 MIN: CPT | Performed by: INTERNAL MEDICINE

## 2020-10-30 SDOH — HEALTH STABILITY: PHYSICAL HEALTH: ON AVERAGE, HOW MANY DAYS PER WEEK DO YOU ENGAGE IN MODERATE TO STRENUOUS EXERCISE (LIKE A BRISK WALK)?: 3 DAYS

## 2020-10-30 SDOH — HEALTH STABILITY: PHYSICAL HEALTH: ON AVERAGE, HOW MANY MINUTES DO YOU ENGAGE IN EXERCISE AT THIS LEVEL?: 60 MIN

## 2020-10-30 ASSESSMENT — ENCOUNTER SYMPTOMS
COUGH: 0
SUSPICIOUS LESIONS: 0
WEIGHT GAIN: 0
CHILLS: 0
WEIGHT LOSS: 0
BRUISES/BLEEDS EASILY: 0
ALLERGIC/IMMUNOLOGIC COMMENTS: NO NEW FOOD ALLERGIES
HEMOPTYSIS: 0
HEMATOCHEZIA: 0
FEVER: 0

## 2020-10-30 ASSESSMENT — PATIENT HEALTH QUESTIONNAIRE - PHQ9
CLINICAL INTERPRETATION OF PHQ9 SCORE: NO FURTHER SCREENING NEEDED
CLINICAL INTERPRETATION OF PHQ2 SCORE: NO FURTHER SCREENING NEEDED
SUM OF ALL RESPONSES TO PHQ9 QUESTIONS 1 AND 2: 0
2. FEELING DOWN, DEPRESSED OR HOPELESS: NOT AT ALL
1. LITTLE INTEREST OR PLEASURE IN DOING THINGS: NOT AT ALL
SUM OF ALL RESPONSES TO PHQ9 QUESTIONS 1 AND 2: 0

## 2021-01-06 ENCOUNTER — TELEPHONE (OUTPATIENT)
Dept: CARDIOLOGY | Age: 81
End: 2021-01-06

## 2021-01-06 DIAGNOSIS — N18.30 STAGE 3 CHRONIC KIDNEY DISEASE, UNSPECIFIED WHETHER STAGE 3A OR 3B CKD (CMD): Primary | ICD-10-CM

## 2021-01-06 DIAGNOSIS — R42 LIGHTHEADED: ICD-10-CM

## 2021-01-06 RX ORDER — TORSEMIDE 20 MG/1
10 TABLET ORAL DAILY
COMMUNITY
End: 2021-11-09 | Stop reason: SDUPTHER

## 2021-01-06 RX ORDER — POTASSIUM CHLORIDE 20 MEQ/1
10 TABLET, EXTENDED RELEASE ORAL 2 TIMES DAILY
COMMUNITY
End: 2022-04-15 | Stop reason: SDUPTHER

## 2021-01-19 ENCOUNTER — TELEPHONE (OUTPATIENT)
Dept: CARDIOLOGY | Age: 81
End: 2021-01-19

## 2021-01-19 DIAGNOSIS — N18.30 STAGE 3 CHRONIC KIDNEY DISEASE, UNSPECIFIED WHETHER STAGE 3A OR 3B CKD (CMD): ICD-10-CM

## 2021-01-19 DIAGNOSIS — R42 LIGHTHEADED: ICD-10-CM

## 2021-02-02 DIAGNOSIS — Z23 NEED FOR VACCINATION: ICD-10-CM

## 2021-02-09 ENCOUNTER — TELEPHONE (OUTPATIENT)
Dept: CARDIOLOGY | Age: 81
End: 2021-02-09

## 2021-06-02 ENCOUNTER — APPOINTMENT (OUTPATIENT)
Dept: CARDIOLOGY | Age: 81
End: 2021-06-02

## 2021-06-28 ENCOUNTER — APPOINTMENT (OUTPATIENT)
Dept: CARDIOLOGY | Age: 81
End: 2021-06-28

## 2021-07-02 ENCOUNTER — APPOINTMENT (OUTPATIENT)
Dept: CARDIOLOGY | Age: 81
End: 2021-07-02

## 2021-07-06 ENCOUNTER — HOSPITAL ENCOUNTER (OUTPATIENT)
Dept: ULTRASOUND IMAGING | Facility: HOSPITAL | Age: 81
Discharge: HOME | End: 2021-07-06
Attending: INTERNAL MEDICINE
Payer: MEDICARE

## 2021-07-06 DIAGNOSIS — R09.89 BRUIT OF RIGHT CAROTID ARTERY: ICD-10-CM

## 2021-07-06 PROCEDURE — 93880 EXTRACRANIAL BILAT STUDY: CPT | Performed by: INTERNAL MEDICINE

## 2021-09-03 ENCOUNTER — LAB REQUISITION (OUTPATIENT)
Dept: LAB | Age: 81
End: 2021-09-03

## 2021-09-03 ENCOUNTER — LAB SERVICES (OUTPATIENT)
Dept: LAB | Age: 81
End: 2021-09-03

## 2021-09-03 DIAGNOSIS — I10 ESSENTIAL (PRIMARY) HYPERTENSION: ICD-10-CM

## 2021-09-03 DIAGNOSIS — E03.9 HYPOTHYROIDISM, UNSPECIFIED: ICD-10-CM

## 2021-09-03 DIAGNOSIS — D50.8 OTHER IRON DEFICIENCY ANEMIAS: ICD-10-CM

## 2021-09-03 DIAGNOSIS — E55.9 VITAMIN D DEFICIENCY, UNSPECIFIED: ICD-10-CM

## 2021-09-03 LAB
25(OH)D3+25(OH)D2 SERPL-MCNC: 31.2 NG/ML (ref 30–100)
ALBUMIN SERPL-MCNC: 4.2 G/DL (ref 3.6–5.1)
ALBUMIN/GLOB SERPL: 1.3 {RATIO} (ref 1–2.4)
ALP SERPL-CCNC: 135 UNITS/L (ref 45–117)
ALT SERPL-CCNC: 22 UNITS/L
ANION GAP SERPL CALC-SCNC: 13 MMOL/L (ref 10–20)
AST SERPL-CCNC: 20 UNITS/L
BASOPHILS # BLD: 0.1 K/MCL (ref 0–0.3)
BASOPHILS NFR BLD: 1 %
BILIRUB SERPL-MCNC: 1.6 MG/DL (ref 0.2–1)
BUN SERPL-MCNC: 21 MG/DL (ref 6–20)
BUN/CREAT SERPL: 17 (ref 7–25)
CALCIUM SERPL-MCNC: 9.4 MG/DL (ref 8.4–10.2)
CHLORIDE SERPL-SCNC: 106 MMOL/L (ref 98–107)
CHOLEST SERPL-MCNC: 167 MG/DL
CHOLEST/HDLC SERPL: 3.6 {RATIO}
CO2 SERPL-SCNC: 28 MMOL/L (ref 21–32)
CREAT SERPL-MCNC: 1.27 MG/DL (ref 0.51–0.95)
DEPRECATED RDW RBC: 53 FL (ref 39–50)
EOSINOPHIL # BLD: 0.4 K/MCL (ref 0–0.5)
EOSINOPHIL NFR BLD: 5 %
ERYTHROCYTE [DISTWIDTH] IN BLOOD: 15.7 % (ref 11–15)
FASTING DURATION TIME PATIENT: 12 HOURS (ref 0–999)
FASTING DURATION TIME PATIENT: 12 HOURS (ref 0–999)
GFR SERPLBLD BASED ON 1.73 SQ M-ARVRAT: 40 ML/MIN
GLOBULIN SER-MCNC: 3.2 G/DL (ref 2–4)
GLUCOSE SERPL-MCNC: 101 MG/DL (ref 65–99)
HCT VFR BLD CALC: 37.7 % (ref 36–46.5)
HDLC SERPL-MCNC: 47 MG/DL
HGB BLD-MCNC: 11.9 G/DL (ref 12–15.5)
IMM GRANULOCYTES # BLD AUTO: 0 K/MCL (ref 0–0.2)
IMM GRANULOCYTES # BLD: 0 %
LDLC SERPL CALC-MCNC: 69 MG/DL
LYMPHOCYTES # BLD: 1.4 K/MCL (ref 1–4)
LYMPHOCYTES NFR BLD: 17 %
MCH RBC QN AUTO: 29.1 PG (ref 26–34)
MCHC RBC AUTO-ENTMCNC: 31.6 G/DL (ref 32–36.5)
MCV RBC AUTO: 92.2 FL (ref 78–100)
MONOCYTES # BLD: 0.7 K/MCL (ref 0.3–0.9)
MONOCYTES NFR BLD: 8 %
NEUTROPHILS # BLD: 6 K/MCL (ref 1.8–7.7)
NEUTROPHILS NFR BLD: 69 %
NONHDLC SERPL-MCNC: 120 MG/DL
NRBC BLD MANUAL-RTO: 0 /100 WBC
PLATELET # BLD AUTO: 259 K/MCL (ref 140–450)
POTASSIUM SERPL-SCNC: 4.7 MMOL/L (ref 3.4–5.1)
PROT SERPL-MCNC: 7.4 G/DL (ref 6.4–8.2)
RBC # BLD: 4.09 MIL/MCL (ref 4–5.2)
SODIUM SERPL-SCNC: 142 MMOL/L (ref 135–145)
T4 FREE SERPL-MCNC: 0.9 NG/DL (ref 0.8–1.5)
TRIGL SERPL-MCNC: 257 MG/DL
TSH SERPL-ACNC: 5.1 MCUNITS/ML (ref 0.35–5)
WBC # BLD: 8.6 K/MCL (ref 4.2–11)

## 2021-09-03 PROCEDURE — 84443 ASSAY THYROID STIM HORMONE: CPT | Performed by: CLINICAL MEDICAL LABORATORY

## 2021-09-03 PROCEDURE — 84439 ASSAY OF FREE THYROXINE: CPT | Performed by: CLINICAL MEDICAL LABORATORY

## 2021-09-03 PROCEDURE — 85025 COMPLETE CBC W/AUTO DIFF WBC: CPT | Performed by: CLINICAL MEDICAL LABORATORY

## 2021-09-03 PROCEDURE — 80053 COMPREHEN METABOLIC PANEL: CPT | Performed by: CLINICAL MEDICAL LABORATORY

## 2021-09-03 PROCEDURE — 82306 VITAMIN D 25 HYDROXY: CPT | Performed by: CLINICAL MEDICAL LABORATORY

## 2021-09-03 PROCEDURE — 80061 LIPID PANEL: CPT | Performed by: CLINICAL MEDICAL LABORATORY

## 2021-09-03 PROCEDURE — 36415 COLL VENOUS BLD VENIPUNCTURE: CPT | Performed by: CLINICAL MEDICAL LABORATORY

## 2021-10-11 ENCOUNTER — HOSPITAL ENCOUNTER (EMERGENCY)
Facility: HOSPITAL | Age: 81
Discharge: HOME | End: 2021-10-11
Attending: EMERGENCY MEDICINE
Payer: MEDICARE

## 2021-10-11 VITALS
HEART RATE: 94 BPM | BODY MASS INDEX: 27.23 KG/M2 | HEIGHT: 62 IN | WEIGHT: 148 LBS | RESPIRATION RATE: 21 BRPM | DIASTOLIC BLOOD PRESSURE: 86 MMHG | OXYGEN SATURATION: 93 % | TEMPERATURE: 98 F | SYSTOLIC BLOOD PRESSURE: 114 MMHG

## 2021-10-11 DIAGNOSIS — R10.9 ABDOMINAL PAIN OF UNKNOWN ETIOLOGY: Primary | ICD-10-CM

## 2021-10-11 PROCEDURE — 83690 ASSAY OF LIPASE: CPT | Performed by: EMERGENCY MEDICINE

## 2021-10-11 PROCEDURE — 96360 HYDRATION IV INFUSION INIT: CPT

## 2021-10-11 PROCEDURE — 85025 COMPLETE CBC W/AUTO DIFF WBC: CPT | Performed by: EMERGENCY MEDICINE

## 2021-10-11 PROCEDURE — 81001 URINALYSIS AUTO W/SCOPE: CPT | Performed by: EMERGENCY MEDICINE

## 2021-10-11 PROCEDURE — 80048 BASIC METABOLIC PNL TOTAL CA: CPT | Performed by: EMERGENCY MEDICINE

## 2021-10-11 PROCEDURE — 80076 HEPATIC FUNCTION PANEL: CPT | Performed by: EMERGENCY MEDICINE

## 2021-10-11 PROCEDURE — 96361 HYDRATE IV INFUSION ADD-ON: CPT

## 2021-10-11 PROCEDURE — 99284 EMERGENCY DEPT VISIT MOD MDM: CPT

## 2021-10-11 NOTE — ED PROVIDER NOTES
Patient Seen in: [facility] Emergency Department    History   Patient presents with:  Rectal Pain      HPI    79-year-old female presents the ER with complaint of rectal pain abdominal pain for the past 5 days.   Patient with past medical history of ENDOSCOPY   • EGD N/A 8/19/2016    Procedure: ESOPHAGOGASTRODUODENOSCOPY (EGD); Surgeon: Phillip Lopez MD;  Location: [address] [place] [address] ENDOSCOPY   • EGD N/A 8/26/2016    Procedure: ESOPHAGOGASTRODUODENOSCOPY (EGD);   Surgeon: Amado Del Rosario MD;  Location: [address] [place] [address] O2 Device None (Room air)       All measures to prevent infection transmission during my interaction with the patient were taken. The patient was already wearing a droplet mask on my arrival to the room.  Personal protective equipment including droplet ma Calculated Osmolality 296 (*)     GFR, Non- 36 (*)     GFR, -American 41 (*)     All other components within normal limits   HEPATIC FUNCTION PANEL (7) - Abnormal; Notable for the following components:    Bilirubin, Direct 0.3 (*) any recent pertinent discharge summaries, testing, and procedures and reviewed those reports. Complicating Factors: The patient already has does not have any pertinent problems on file. to contribute to the complexity of this ED evaluation.     ED Course

## 2021-10-11 NOTE — ED QUICK NOTES
Patient reports that she has rectal burning x 5 days when she moves. Patient is on blood thinners and iron. Reports that there was a drop of blood in her stool, but she couldn't tell if its the iron she takes.

## 2021-11-05 ENCOUNTER — APPOINTMENT (OUTPATIENT)
Dept: CARDIOLOGY | Age: 81
End: 2021-11-05

## 2021-11-09 ENCOUNTER — OFFICE VISIT (OUTPATIENT)
Dept: CARDIOLOGY | Age: 81
End: 2021-11-09

## 2021-11-09 VITALS
DIASTOLIC BLOOD PRESSURE: 66 MMHG | HEART RATE: 104 BPM | WEIGHT: 149 LBS | SYSTOLIC BLOOD PRESSURE: 110 MMHG | BODY MASS INDEX: 28.15 KG/M2

## 2021-11-09 DIAGNOSIS — Z95.828 HISTORY OF ENDOVASCULAR STENT GRAFT FOR ABDOMINAL AORTIC ANEURYSM (AAA): ICD-10-CM

## 2021-11-09 DIAGNOSIS — E78.00 PURE HYPERCHOLESTEROLEMIA: ICD-10-CM

## 2021-11-09 DIAGNOSIS — Z95.1 HX OF CABG: ICD-10-CM

## 2021-11-09 DIAGNOSIS — Z86.79 ATRIAL FIBRILLATION, CURRENTLY IN SINUS RHYTHM: Primary | ICD-10-CM

## 2021-11-09 DIAGNOSIS — N18.30 STAGE 3 CHRONIC KIDNEY DISEASE, UNSPECIFIED WHETHER STAGE 3A OR 3B CKD (CMD): ICD-10-CM

## 2021-11-09 DIAGNOSIS — I25.10 CORONARY ARTERY DISEASE INVOLVING NATIVE CORONARY ARTERY OF NATIVE HEART WITHOUT ANGINA PECTORIS: ICD-10-CM

## 2021-11-09 PROCEDURE — 99214 OFFICE O/P EST MOD 30 MIN: CPT | Performed by: NURSE PRACTITIONER

## 2021-11-09 RX ORDER — TORSEMIDE 20 MG/1
10 TABLET ORAL 2 TIMES DAILY
Qty: 90 TABLET | Refills: 3 | Status: SHIPPED | COMMUNITY
Start: 2021-11-09 | End: 2022-01-28 | Stop reason: SDUPTHER

## 2021-12-10 ENCOUNTER — LAB REQUISITION (OUTPATIENT)
Dept: LAB | Age: 81
End: 2021-12-10

## 2021-12-10 DIAGNOSIS — E55.9 VITAMIN D DEFICIENCY, UNSPECIFIED: ICD-10-CM

## 2021-12-10 DIAGNOSIS — I10 ESSENTIAL (PRIMARY) HYPERTENSION: ICD-10-CM

## 2021-12-10 DIAGNOSIS — E03.9 HYPOTHYROIDISM, UNSPECIFIED: ICD-10-CM

## 2021-12-10 DIAGNOSIS — D50.8 OTHER IRON DEFICIENCY ANEMIAS: ICD-10-CM

## 2021-12-14 ENCOUNTER — LAB SERVICES (OUTPATIENT)
Dept: LAB | Age: 81
End: 2021-12-14

## 2021-12-14 LAB
25(OH)D3+25(OH)D2 SERPL-MCNC: 45.7 NG/ML (ref 30–100)
ALBUMIN SERPL-MCNC: 4.1 G/DL (ref 3.6–5.1)
ALBUMIN/GLOB SERPL: 1.3 {RATIO} (ref 1–2.4)
ALP SERPL-CCNC: 117 UNITS/L (ref 45–117)
ALT SERPL-CCNC: 26 UNITS/L
ANION GAP SERPL CALC-SCNC: 11 MMOL/L (ref 10–20)
AST SERPL-CCNC: 21 UNITS/L
BASOPHILS # BLD: 0 K/MCL (ref 0–0.3)
BASOPHILS NFR BLD: 1 %
BILIRUB SERPL-MCNC: 1.4 MG/DL (ref 0.2–1)
BUN SERPL-MCNC: 20 MG/DL (ref 6–20)
BUN/CREAT SERPL: 16 (ref 7–25)
CALCIUM SERPL-MCNC: 9.6 MG/DL (ref 8.4–10.2)
CHLORIDE SERPL-SCNC: 106 MMOL/L (ref 98–107)
CHOLEST SERPL-MCNC: 145 MG/DL
CHOLEST/HDLC SERPL: 3.5 {RATIO}
CO2 SERPL-SCNC: 28 MMOL/L (ref 21–32)
CREAT SERPL-MCNC: 1.24 MG/DL (ref 0.51–0.95)
DEPRECATED RDW RBC: 54.4 FL (ref 39–50)
EOSINOPHIL # BLD: 0.2 K/MCL (ref 0–0.5)
EOSINOPHIL NFR BLD: 4 %
ERYTHROCYTE [DISTWIDTH] IN BLOOD: 16.3 % (ref 11–15)
FASTING DURATION TIME PATIENT: 12 HOURS (ref 0–999)
FASTING DURATION TIME PATIENT: 12 HOURS (ref 0–999)
GFR SERPLBLD BASED ON 1.73 SQ M-ARVRAT: 41 ML/MIN
GLOBULIN SER-MCNC: 3.2 G/DL (ref 2–4)
GLUCOSE SERPL-MCNC: 111 MG/DL (ref 70–99)
HCT VFR BLD CALC: 38.8 % (ref 36–46.5)
HDLC SERPL-MCNC: 41 MG/DL
HGB BLD-MCNC: 11.7 G/DL (ref 12–15.5)
IMM GRANULOCYTES # BLD AUTO: 0 K/MCL (ref 0–0.2)
IMM GRANULOCYTES # BLD: 0 %
IRON SATN MFR SERPL: 19 % (ref 15–45)
IRON SERPL-MCNC: 47 MCG/DL (ref 50–170)
LDLC SERPL CALC-MCNC: 58 MG/DL
LYMPHOCYTES # BLD: 1.2 K/MCL (ref 1–4)
LYMPHOCYTES NFR BLD: 18 %
MCH RBC QN AUTO: 27.5 PG (ref 26–34)
MCHC RBC AUTO-ENTMCNC: 30.2 G/DL (ref 32–36.5)
MCV RBC AUTO: 91.3 FL (ref 78–100)
MONOCYTES # BLD: 0.5 K/MCL (ref 0.3–0.9)
MONOCYTES NFR BLD: 8 %
NEUTROPHILS # BLD: 4.7 K/MCL (ref 1.8–7.7)
NEUTROPHILS NFR BLD: 69 %
NONHDLC SERPL-MCNC: 104 MG/DL
NRBC BLD MANUAL-RTO: 0 /100 WBC
PLATELET # BLD AUTO: 224 K/MCL (ref 140–450)
POTASSIUM SERPL-SCNC: 4.7 MMOL/L (ref 3.4–5.1)
PROT SERPL-MCNC: 7.3 G/DL (ref 6.4–8.2)
RBC # BLD: 4.25 MIL/MCL (ref 4–5.2)
SODIUM SERPL-SCNC: 140 MMOL/L (ref 135–145)
TIBC SERPL-MCNC: 243 MCG/DL (ref 250–450)
TRIGL SERPL-MCNC: 228 MG/DL
TSH SERPL-ACNC: 4.62 MCUNITS/ML (ref 0.35–5)
WBC # BLD: 6.6 K/MCL (ref 4.2–11)

## 2021-12-14 PROCEDURE — 83550 IRON BINDING TEST: CPT | Performed by: CLINICAL MEDICAL LABORATORY

## 2021-12-14 PROCEDURE — 80061 LIPID PANEL: CPT | Performed by: CLINICAL MEDICAL LABORATORY

## 2021-12-14 PROCEDURE — 36415 COLL VENOUS BLD VENIPUNCTURE: CPT | Performed by: CLINICAL MEDICAL LABORATORY

## 2021-12-14 PROCEDURE — 85025 COMPLETE CBC W/AUTO DIFF WBC: CPT | Performed by: CLINICAL MEDICAL LABORATORY

## 2021-12-14 PROCEDURE — 80053 COMPREHEN METABOLIC PANEL: CPT | Performed by: CLINICAL MEDICAL LABORATORY

## 2021-12-14 PROCEDURE — 82306 VITAMIN D 25 HYDROXY: CPT | Performed by: CLINICAL MEDICAL LABORATORY

## 2021-12-14 PROCEDURE — 83540 ASSAY OF IRON: CPT | Performed by: CLINICAL MEDICAL LABORATORY

## 2021-12-14 PROCEDURE — 84443 ASSAY THYROID STIM HORMONE: CPT | Performed by: CLINICAL MEDICAL LABORATORY

## 2021-12-16 ENCOUNTER — LAB REQUISITION (OUTPATIENT)
Dept: LAB | Age: 81
End: 2021-12-16

## 2021-12-16 DIAGNOSIS — Z12.11 ENCOUNTER FOR SCREENING FOR MALIGNANT NEOPLASM OF COLON: ICD-10-CM

## 2021-12-17 ENCOUNTER — APPOINTMENT (OUTPATIENT)
Dept: LAB | Age: 81
End: 2021-12-17

## 2021-12-17 PROCEDURE — 82274 ASSAY TEST FOR BLOOD FECAL: CPT | Performed by: CLINICAL MEDICAL LABORATORY

## 2021-12-18 LAB — HEMOCCULT STL QL: NEGATIVE

## 2022-01-28 RX ORDER — TORSEMIDE 20 MG/1
10 TABLET ORAL 2 TIMES DAILY
Qty: 90 TABLET | Refills: 3 | Status: SHIPPED | OUTPATIENT
Start: 2022-01-28 | End: 2022-02-02 | Stop reason: DRUGHIGH

## 2022-02-02 RX ORDER — TORSEMIDE 10 MG/1
10 TABLET ORAL 2 TIMES DAILY
Qty: 180 TABLET | Refills: 3 | Status: SHIPPED | OUTPATIENT
Start: 2022-02-02 | End: 2022-04-15 | Stop reason: SDUPTHER

## 2022-04-05 ENCOUNTER — LAB SERVICES (OUTPATIENT)
Dept: LAB | Age: 82
End: 2022-04-05

## 2022-04-05 ENCOUNTER — LAB REQUISITION (OUTPATIENT)
Dept: LAB | Age: 82
End: 2022-04-05

## 2022-04-05 DIAGNOSIS — E55.9 VITAMIN D DEFICIENCY, UNSPECIFIED: ICD-10-CM

## 2022-04-05 DIAGNOSIS — E03.9 HYPOTHYROIDISM, UNSPECIFIED: ICD-10-CM

## 2022-04-05 DIAGNOSIS — I10 ESSENTIAL (PRIMARY) HYPERTENSION: ICD-10-CM

## 2022-04-05 DIAGNOSIS — D50.8 OTHER IRON DEFICIENCY ANEMIAS: ICD-10-CM

## 2022-04-05 LAB
25(OH)D3+25(OH)D2 SERPL-MCNC: 39.6 NG/ML (ref 30–100)
ALBUMIN SERPL-MCNC: 4.3 G/DL (ref 3.6–5.1)
ALBUMIN/GLOB SERPL: 1.4 {RATIO} (ref 1–2.4)
ALP SERPL-CCNC: 117 UNITS/L (ref 45–117)
ALT SERPL-CCNC: 18 UNITS/L
ANION GAP SERPL CALC-SCNC: 10 MMOL/L (ref 10–20)
AST SERPL-CCNC: 19 UNITS/L
BASOPHILS # BLD: 0 K/MCL (ref 0–0.3)
BASOPHILS NFR BLD: 1 %
BILIRUB SERPL-MCNC: 1.6 MG/DL (ref 0.2–1)
BUN SERPL-MCNC: 27 MG/DL (ref 6–20)
BUN/CREAT SERPL: 23 (ref 7–25)
CALCIUM SERPL-MCNC: 9.6 MG/DL (ref 8.4–10.2)
CHLORIDE SERPL-SCNC: 108 MMOL/L (ref 98–107)
CHOLEST SERPL-MCNC: 156 MG/DL
CHOLEST/HDLC SERPL: 3.5 {RATIO}
CO2 SERPL-SCNC: 29 MMOL/L (ref 21–32)
CREAT SERPL-MCNC: 1.18 MG/DL (ref 0.51–0.95)
DEPRECATED RDW RBC: 52.8 FL (ref 39–50)
EOSINOPHIL # BLD: 0.3 K/MCL (ref 0–0.5)
EOSINOPHIL NFR BLD: 4 %
ERYTHROCYTE [DISTWIDTH] IN BLOOD: 15.9 % (ref 11–15)
FASTING DURATION TIME PATIENT: ABNORMAL H
FASTING DURATION TIME PATIENT: ABNORMAL H
GFR SERPLBLD BASED ON 1.73 SQ M-ARVRAT: 43 ML/MIN
GLOBULIN SER-MCNC: 3.1 G/DL (ref 2–4)
GLUCOSE SERPL-MCNC: 101 MG/DL (ref 70–99)
HCT VFR BLD CALC: 37.7 % (ref 36–46.5)
HDLC SERPL-MCNC: 44 MG/DL
HGB BLD-MCNC: 12.1 G/DL (ref 12–15.5)
IMM GRANULOCYTES # BLD AUTO: 0 K/MCL (ref 0–0.2)
IMM GRANULOCYTES # BLD: 0 %
IRON SATN MFR SERPL: 16 % (ref 15–45)
IRON SERPL-MCNC: 39 MCG/DL (ref 50–170)
LDLC SERPL CALC-MCNC: 71 MG/DL
LYMPHOCYTES # BLD: 1.3 K/MCL (ref 1–4)
LYMPHOCYTES NFR BLD: 16 %
MCH RBC QN AUTO: 28.9 PG (ref 26–34)
MCHC RBC AUTO-ENTMCNC: 32.1 G/DL (ref 32–36.5)
MCV RBC AUTO: 90.2 FL (ref 78–100)
MONOCYTES # BLD: 0.6 K/MCL (ref 0.3–0.9)
MONOCYTES NFR BLD: 7 %
NEUTROPHILS # BLD: 5.5 K/MCL (ref 1.8–7.7)
NEUTROPHILS NFR BLD: 72 %
NONHDLC SERPL-MCNC: 112 MG/DL
NRBC BLD MANUAL-RTO: 0 /100 WBC
PLATELET # BLD AUTO: 223 K/MCL (ref 140–450)
POTASSIUM SERPL-SCNC: 4.3 MMOL/L (ref 3.4–5.1)
PROT SERPL-MCNC: 7.4 G/DL (ref 6.4–8.2)
RBC # BLD: 4.18 MIL/MCL (ref 4–5.2)
SODIUM SERPL-SCNC: 143 MMOL/L (ref 135–145)
TIBC SERPL-MCNC: 250 MCG/DL (ref 250–450)
TRIGL SERPL-MCNC: 207 MG/DL
TSH SERPL-ACNC: 3.09 MCUNITS/ML (ref 0.35–5)
WBC # BLD: 7.7 K/MCL (ref 4.2–11)

## 2022-04-05 PROCEDURE — 85025 COMPLETE CBC W/AUTO DIFF WBC: CPT | Performed by: CLINICAL MEDICAL LABORATORY

## 2022-04-05 PROCEDURE — 80053 COMPREHEN METABOLIC PANEL: CPT | Performed by: CLINICAL MEDICAL LABORATORY

## 2022-04-05 PROCEDURE — 83540 ASSAY OF IRON: CPT | Performed by: CLINICAL MEDICAL LABORATORY

## 2022-04-05 PROCEDURE — 80061 LIPID PANEL: CPT | Performed by: CLINICAL MEDICAL LABORATORY

## 2022-04-05 PROCEDURE — 36415 COLL VENOUS BLD VENIPUNCTURE: CPT | Performed by: CLINICAL MEDICAL LABORATORY

## 2022-04-05 PROCEDURE — 82306 VITAMIN D 25 HYDROXY: CPT | Performed by: CLINICAL MEDICAL LABORATORY

## 2022-04-05 PROCEDURE — 84443 ASSAY THYROID STIM HORMONE: CPT | Performed by: CLINICAL MEDICAL LABORATORY

## 2022-04-05 PROCEDURE — 83550 IRON BINDING TEST: CPT | Performed by: CLINICAL MEDICAL LABORATORY

## 2022-04-15 ENCOUNTER — TELEPHONE (OUTPATIENT)
Dept: CARDIOLOGY | Age: 82
End: 2022-04-15

## 2022-04-15 DIAGNOSIS — N18.30 STAGE 3 CHRONIC KIDNEY DISEASE, UNSPECIFIED WHETHER STAGE 3A OR 3B CKD (CMD): Primary | ICD-10-CM

## 2022-04-15 RX ORDER — POTASSIUM CHLORIDE 20 MEQ/1
10 TABLET, EXTENDED RELEASE ORAL 2 TIMES DAILY
Qty: 90 TABLET | Refills: 1 | Status: SHIPPED | OUTPATIENT
Start: 2022-04-15 | End: 2022-09-28

## 2022-04-15 RX ORDER — METOPROLOL SUCCINATE 50 MG/1
50 TABLET, EXTENDED RELEASE ORAL DAILY
Qty: 90 TABLET | Refills: 1 | Status: SHIPPED | OUTPATIENT
Start: 2022-04-15 | End: 2022-10-12

## 2022-04-15 RX ORDER — TORSEMIDE 20 MG/1
10 TABLET ORAL 2 TIMES DAILY
Qty: 90 TABLET | Refills: 1 | Status: SHIPPED | OUTPATIENT
Start: 2022-04-15 | End: 2022-10-05

## 2022-05-10 ENCOUNTER — OFFICE VISIT (OUTPATIENT)
Dept: CARDIOLOGY | Age: 82
End: 2022-05-10

## 2022-05-10 VITALS
WEIGHT: 151 LBS | SYSTOLIC BLOOD PRESSURE: 112 MMHG | DIASTOLIC BLOOD PRESSURE: 66 MMHG | BODY MASS INDEX: 28.53 KG/M2 | HEART RATE: 70 BPM

## 2022-05-10 DIAGNOSIS — I48.0 PAROXYSMAL ATRIAL FIBRILLATION (CMD): Primary | ICD-10-CM

## 2022-05-10 PROCEDURE — 99215 OFFICE O/P EST HI 40 MIN: CPT | Performed by: INTERNAL MEDICINE

## 2022-05-12 DIAGNOSIS — I48.0 PAROXYSMAL ATRIAL FIBRILLATION (CMD): ICD-10-CM

## 2022-05-12 PROCEDURE — 93000 ELECTROCARDIOGRAM COMPLETE: CPT | Performed by: INTERNAL MEDICINE

## 2022-08-30 ENCOUNTER — APPOINTMENT (OUTPATIENT)
Dept: GENERAL RADIOLOGY | Facility: HOSPITAL | Age: 82
End: 2022-08-30
Attending: EMERGENCY MEDICINE
Payer: MEDICARE

## 2022-08-30 ENCOUNTER — HOSPITAL ENCOUNTER (EMERGENCY)
Facility: HOSPITAL | Age: 82
Discharge: HOME | End: 2022-08-30
Attending: EMERGENCY MEDICINE
Payer: MEDICARE

## 2022-08-30 VITALS
RESPIRATION RATE: 20 BRPM | BODY MASS INDEX: 27.75 KG/M2 | TEMPERATURE: 98 F | HEIGHT: 61 IN | SYSTOLIC BLOOD PRESSURE: 119 MMHG | DIASTOLIC BLOOD PRESSURE: 71 MMHG | WEIGHT: 147 LBS | OXYGEN SATURATION: 91 % | HEART RATE: 72 BPM

## 2022-08-30 DIAGNOSIS — I48.91 ATRIAL FIBRILLATION WITH RAPID VENTRICULAR RESPONSE (HCC): Primary | ICD-10-CM

## 2022-08-30 LAB
ANION GAP SERPL CALC-SCNC: 5 MMOL/L (ref 0–18)
BASOPHILS # BLD AUTO: 0.03 X10(3) UL (ref 0–0.2)
BASOPHILS NFR BLD AUTO: 0.4 %
BUN BLD-MCNC: 28 MG/DL (ref 7–18)
BUN/CREAT SERPL: 19.4 (ref 10–20)
CALCIUM BLD-MCNC: 9.4 MG/DL (ref 8.5–10.1)
CHLORIDE SERPL-SCNC: 107 MMOL/L (ref 98–112)
CO2 SERPL-SCNC: 27 MMOL/L (ref 21–32)
CREAT BLD-MCNC: 1.44 MG/DL
DEPRECATED RDW RBC AUTO: 52.6 FL (ref 35.1–46.3)
EOSINOPHIL # BLD AUTO: 0.34 X10(3) UL (ref 0–0.7)
EOSINOPHIL NFR BLD AUTO: 4.5 %
ERYTHROCYTE [DISTWIDTH] IN BLOOD BY AUTOMATED COUNT: 15.4 % (ref 11–15)
GFR SERPLBLD BASED ON 1.73 SQ M-ARVRAT: 36 ML/MIN/1.73M2 (ref 60–?)
GLUCOSE BLD-MCNC: 119 MG/DL (ref 70–99)
HCT VFR BLD AUTO: 38.7 %
HGB BLD-MCNC: 11.9 G/DL
IMM GRANULOCYTES # BLD AUTO: 0.03 X10(3) UL (ref 0–1)
IMM GRANULOCYTES NFR BLD: 0.4 %
LYMPHOCYTES # BLD AUTO: 1.52 X10(3) UL (ref 1–4)
LYMPHOCYTES NFR BLD AUTO: 19.9 %
MCH RBC QN AUTO: 28.7 PG (ref 26–34)
MCHC RBC AUTO-ENTMCNC: 30.7 G/DL (ref 31–37)
MCV RBC AUTO: 93.5 FL
MONOCYTES # BLD AUTO: 0.56 X10(3) UL (ref 0.1–1)
MONOCYTES NFR BLD AUTO: 7.3 %
NEUTROPHILS # BLD AUTO: 5.15 X10 (3) UL (ref 1.5–7.7)
NEUTROPHILS # BLD AUTO: 5.15 X10(3) UL (ref 1.5–7.7)
NEUTROPHILS NFR BLD AUTO: 67.5 %
OSMOLALITY SERPL CALC.SUM OF ELEC: 295 MOSM/KG (ref 275–295)
PLATELET # BLD AUTO: 192 10(3)UL (ref 150–450)
POTASSIUM SERPL-SCNC: 3.9 MMOL/L (ref 3.5–5.1)
RBC # BLD AUTO: 4.14 X10(6)UL
SODIUM SERPL-SCNC: 139 MMOL/L (ref 136–145)
TROPONIN I HIGH SENSITIVITY: 11 NG/L
WBC # BLD AUTO: 7.6 X10(3) UL (ref 4–11)

## 2022-08-30 PROCEDURE — 85025 COMPLETE CBC W/AUTO DIFF WBC: CPT | Performed by: EMERGENCY MEDICINE

## 2022-08-30 PROCEDURE — 80048 BASIC METABOLIC PNL TOTAL CA: CPT | Performed by: EMERGENCY MEDICINE

## 2022-08-30 PROCEDURE — 93005 ELECTROCARDIOGRAM TRACING: CPT

## 2022-08-30 PROCEDURE — 96361 HYDRATE IV INFUSION ADD-ON: CPT

## 2022-08-30 PROCEDURE — 99284 EMERGENCY DEPT VISIT MOD MDM: CPT

## 2022-08-30 PROCEDURE — 71045 X-RAY EXAM CHEST 1 VIEW: CPT | Performed by: EMERGENCY MEDICINE

## 2022-08-30 PROCEDURE — 96374 THER/PROPH/DIAG INJ IV PUSH: CPT

## 2022-08-30 PROCEDURE — 93010 ELECTROCARDIOGRAM REPORT: CPT | Performed by: EMERGENCY MEDICINE

## 2022-08-30 PROCEDURE — 84484 ASSAY OF TROPONIN QUANT: CPT | Performed by: EMERGENCY MEDICINE

## 2022-08-30 RX ORDER — DILTIAZEM HYDROCHLORIDE 120 MG/1
120 CAPSULE, COATED, EXTENDED RELEASE ORAL DAILY
Qty: 30 CAPSULE | Refills: 0 | Status: SHIPPED | OUTPATIENT
Start: 2022-08-30 | End: 2022-09-29

## 2022-08-30 RX ORDER — DILTIAZEM HYDROCHLORIDE 120 MG/1
120 CAPSULE, COATED, EXTENDED RELEASE ORAL DAILY
Qty: 30 CAPSULE | Refills: 0 | Status: SHIPPED | OUTPATIENT
Start: 2022-08-30 | End: 2022-08-30

## 2022-08-30 RX ORDER — DILTIAZEM HYDROCHLORIDE 120 MG/1
120 CAPSULE, EXTENDED RELEASE ORAL ONCE
Status: COMPLETED | OUTPATIENT
Start: 2022-08-30 | End: 2022-08-30

## 2022-08-30 RX ORDER — DILTIAZEM HYDROCHLORIDE 5 MG/ML
20 INJECTION INTRAVENOUS ONCE
Status: COMPLETED | OUTPATIENT
Start: 2022-08-30 | End: 2022-08-30

## 2022-08-30 NOTE — ED INITIAL ASSESSMENT (HPI)
Pt to ED with daughter with c/o dyspnea for about 3 days. Pt states difficulty \"catching her breath\". Pt denies hx of AFIB. Pt denies chest pain. Pt skin warm and dry. Pt is alert and oriented x4. Pt denies cough or fever.

## 2022-08-31 ENCOUNTER — TELEPHONE (OUTPATIENT)
Dept: CARDIOLOGY | Age: 82
End: 2022-08-31

## 2022-09-09 ENCOUNTER — OFFICE VISIT (OUTPATIENT)
Dept: CARDIOLOGY | Age: 82
End: 2022-09-09

## 2022-09-09 VITALS
DIASTOLIC BLOOD PRESSURE: 60 MMHG | BODY MASS INDEX: 27.78 KG/M2 | WEIGHT: 147 LBS | SYSTOLIC BLOOD PRESSURE: 112 MMHG | HEART RATE: 75 BPM

## 2022-09-09 DIAGNOSIS — I48.0 PAROXYSMAL ATRIAL FIBRILLATION (CMD): Primary | ICD-10-CM

## 2022-09-09 PROCEDURE — 99215 OFFICE O/P EST HI 40 MIN: CPT | Performed by: INTERNAL MEDICINE

## 2022-09-09 PROCEDURE — 93000 ELECTROCARDIOGRAM COMPLETE: CPT | Performed by: INTERNAL MEDICINE

## 2022-09-09 RX ORDER — AMIODARONE HYDROCHLORIDE 200 MG/1
200 TABLET ORAL DAILY
Qty: 30 TABLET | Refills: 5 | Status: SHIPPED | OUTPATIENT
Start: 2022-09-09 | End: 2022-10-14 | Stop reason: SDUPTHER

## 2022-09-09 RX ORDER — AMIODARONE HYDROCHLORIDE 200 MG/1
200 TABLET ORAL DAILY
Qty: 30 TABLET | Refills: 5 | Status: SHIPPED | OUTPATIENT
Start: 2022-09-09 | End: 2022-09-09 | Stop reason: SDUPTHER

## 2022-09-09 RX ORDER — DILTIAZEM HYDROCHLORIDE 120 MG/1
120 CAPSULE, EXTENDED RELEASE ORAL DAILY
COMMUNITY
Start: 2022-08-30 | End: 2022-10-10 | Stop reason: SDUPTHER

## 2022-09-09 SDOH — HEALTH STABILITY: PHYSICAL HEALTH: ON AVERAGE, HOW MANY DAYS PER WEEK DO YOU ENGAGE IN MODERATE TO STRENUOUS EXERCISE (LIKE A BRISK WALK)?: 0 DAYS

## 2022-09-09 SDOH — HEALTH STABILITY: PHYSICAL HEALTH: ON AVERAGE, HOW MANY MINUTES DO YOU ENGAGE IN EXERCISE AT THIS LEVEL?: 0 MIN

## 2022-09-13 ENCOUNTER — TELEPHONE (OUTPATIENT)
Dept: CARDIOLOGY | Age: 82
End: 2022-09-13

## 2022-09-19 ENCOUNTER — TELEPHONE (OUTPATIENT)
Dept: CARDIOLOGY | Age: 82
End: 2022-09-19

## 2022-09-22 ENCOUNTER — TELEPHONE (OUTPATIENT)
Dept: CARDIOLOGY | Age: 82
End: 2022-09-22

## 2022-09-22 ENCOUNTER — APPOINTMENT (OUTPATIENT)
Dept: CARDIOLOGY | Age: 82
End: 2022-09-22
Attending: INTERNAL MEDICINE

## 2022-09-22 ENCOUNTER — ANCILLARY PROCEDURE (OUTPATIENT)
Dept: CARDIOLOGY | Age: 82
End: 2022-09-22
Attending: INTERNAL MEDICINE

## 2022-09-22 DIAGNOSIS — I48.0 PAROXYSMAL ATRIAL FIBRILLATION (CMD): ICD-10-CM

## 2022-09-22 LAB
AORTIC VALVE AREA: 2.34
AV MEAN GRADIENT (AVMG): 4
AV MEAN VELOCITY (AVMV): 1.01
AV PEAK GRADIENT (AVPG): 10
AV PEAK VELOCITY (AVPV): 1.58
AV STENOSIS SEVERITY TEXT: NORMAL
DOP CALC LVOT PEAK VEL (LVOTPV): 1.42
E WAVE DECELARATION TIME (MDT): 261
EST RIGHT VENT SYSTOLIC PRESSURE BY TRICUSPID REGURGITATION JET (RVSP): 48
INTERVENTRICULAR SEPTUM IN END DIASTOLE (IVSD): 0.9
LEFT INTERNAL DIMENSION IN SYSTOLE (LVSD): 2.38
LEFT VENTRICULAR INTERNAL DIMENSION IN DIASTOLE (LVDD): 3.63
LEFT VENTRICULAR POSTERIOR WALL IN END DIASTOLE (LVPW): 1.02
LV EF: NORMAL %
LVOT 2D (LVOTD): 1.9
LVOT VTI (LVOTVTI): 31.4
MV E TISSUE VEL LAT (MELV): 11.5
MV E TISSUE VEL MED (MESV): 9.07
MV E WAVE VEL/E TISSUE VEL MED(MSR): 11.8
MV PEAK E VELOCITY (MVPEV): 1.07
PV PEAK VELOCITY (PVPV): 0.73
TRICUSPID VALVE PEAK REGURGITATION VELOCITY (TRPV): 2.96

## 2022-09-22 PROCEDURE — 93356 MYOCRD STRAIN IMG SPCKL TRCK: CPT | Performed by: INTERNAL MEDICINE

## 2022-09-22 PROCEDURE — 93306 TTE W/DOPPLER COMPLETE: CPT | Performed by: INTERNAL MEDICINE

## 2022-09-28 RX ORDER — POTASSIUM CHLORIDE 1500 MG/1
TABLET, EXTENDED RELEASE ORAL
Qty: 90 TABLET | Refills: 3 | Status: SHIPPED | OUTPATIENT
Start: 2022-09-28 | End: 2022-12-27 | Stop reason: DRUGHIGH

## 2022-09-30 ENCOUNTER — LAB REQUISITION (OUTPATIENT)
Dept: LAB | Age: 82
End: 2022-09-30

## 2022-09-30 ENCOUNTER — LAB SERVICES (OUTPATIENT)
Dept: LAB | Age: 82
End: 2022-09-30

## 2022-09-30 DIAGNOSIS — E03.9 HYPOTHYROIDISM, UNSPECIFIED: ICD-10-CM

## 2022-09-30 DIAGNOSIS — E55.9 VITAMIN D DEFICIENCY, UNSPECIFIED: ICD-10-CM

## 2022-09-30 DIAGNOSIS — I10 ESSENTIAL (PRIMARY) HYPERTENSION: ICD-10-CM

## 2022-09-30 LAB
25(OH)D3+25(OH)D2 SERPL-MCNC: 37.5 NG/ML (ref 30–100)
ALBUMIN SERPL-MCNC: 4.1 G/DL (ref 3.6–5.1)
ALBUMIN/GLOB SERPL: 1.3 {RATIO} (ref 1–2.4)
ALP SERPL-CCNC: 140 UNITS/L (ref 45–117)
ALT SERPL-CCNC: 18 UNITS/L
ANION GAP SERPL CALC-SCNC: 10 MMOL/L (ref 7–19)
AST SERPL-CCNC: 15 UNITS/L
BILIRUB SERPL-MCNC: 1.4 MG/DL (ref 0.2–1)
BUN SERPL-MCNC: 21 MG/DL (ref 6–20)
BUN/CREAT SERPL: 16 (ref 7–25)
CALCIUM SERPL-MCNC: 9.5 MG/DL (ref 8.4–10.2)
CHLORIDE SERPL-SCNC: 105 MMOL/L (ref 97–110)
CHOLEST SERPL-MCNC: 142 MG/DL
CHOLEST/HDLC SERPL: 2.8 {RATIO}
CO2 SERPL-SCNC: 28 MMOL/L (ref 21–32)
CREAT SERPL-MCNC: 1.31 MG/DL (ref 0.51–0.95)
FASTING DURATION TIME PATIENT: ABNORMAL H
FASTING DURATION TIME PATIENT: NORMAL H
GFR SERPLBLD BASED ON 1.73 SQ M-ARVRAT: 41 ML/MIN
GLOBULIN SER-MCNC: 3.2 G/DL (ref 2–4)
GLUCOSE SERPL-MCNC: 100 MG/DL (ref 70–99)
HDLC SERPL-MCNC: 50 MG/DL
LDLC SERPL CALC-MCNC: 65 MG/DL
NONHDLC SERPL-MCNC: 92 MG/DL
POTASSIUM SERPL-SCNC: 4.4 MMOL/L (ref 3.4–5.1)
PROT SERPL-MCNC: 7.3 G/DL (ref 6.4–8.2)
SODIUM SERPL-SCNC: 139 MMOL/L (ref 135–145)
T4 FREE SERPL-MCNC: 1.1 NG/DL (ref 0.8–1.5)
TRIGL SERPL-MCNC: 134 MG/DL
TSH SERPL-ACNC: 6.44 MCUNITS/ML (ref 0.35–5)

## 2022-09-30 PROCEDURE — 84443 ASSAY THYROID STIM HORMONE: CPT | Performed by: CLINICAL MEDICAL LABORATORY

## 2022-09-30 PROCEDURE — 84439 ASSAY OF FREE THYROXINE: CPT | Performed by: CLINICAL MEDICAL LABORATORY

## 2022-09-30 PROCEDURE — 36415 COLL VENOUS BLD VENIPUNCTURE: CPT | Performed by: CLINICAL MEDICAL LABORATORY

## 2022-09-30 PROCEDURE — 80061 LIPID PANEL: CPT | Performed by: CLINICAL MEDICAL LABORATORY

## 2022-09-30 PROCEDURE — 80053 COMPREHEN METABOLIC PANEL: CPT | Performed by: CLINICAL MEDICAL LABORATORY

## 2022-09-30 PROCEDURE — 82306 VITAMIN D 25 HYDROXY: CPT | Performed by: CLINICAL MEDICAL LABORATORY

## 2022-10-05 RX ORDER — TORSEMIDE 20 MG/1
TABLET ORAL
Qty: 90 TABLET | Refills: 1 | Status: SHIPPED | OUTPATIENT
Start: 2022-10-05 | End: 2022-12-27 | Stop reason: DRUGHIGH

## 2022-10-10 ENCOUNTER — TELEPHONE (OUTPATIENT)
Dept: CARDIOLOGY | Age: 82
End: 2022-10-10

## 2022-10-10 RX ORDER — DILTIAZEM HYDROCHLORIDE 120 MG/1
120 CAPSULE, EXTENDED RELEASE ORAL DAILY
Qty: 7 CAPSULE | Refills: 0 | Status: SHIPPED | OUTPATIENT
Start: 2022-10-10 | End: 2022-10-10 | Stop reason: SDUPTHER

## 2022-10-10 RX ORDER — DILTIAZEM HYDROCHLORIDE 120 MG/1
120 CAPSULE, EXTENDED RELEASE ORAL DAILY
Qty: 90 CAPSULE | Refills: 3 | Status: SHIPPED | OUTPATIENT
Start: 2022-10-10 | End: 2022-10-12 | Stop reason: SDUPTHER

## 2022-10-12 RX ORDER — DILTIAZEM HYDROCHLORIDE 120 MG/1
CAPSULE, EXTENDED RELEASE ORAL
Qty: 7 CAPSULE | Refills: 0 | Status: SHIPPED | OUTPATIENT
Start: 2022-10-12

## 2022-10-12 RX ORDER — METOPROLOL SUCCINATE 50 MG/1
TABLET, EXTENDED RELEASE ORAL
Qty: 90 TABLET | Refills: 3 | Status: SHIPPED | OUTPATIENT
Start: 2022-10-12

## 2022-10-12 RX ORDER — DILTIAZEM HYDROCHLORIDE 120 MG/1
120 CAPSULE, EXTENDED RELEASE ORAL DAILY
Qty: 30 CAPSULE | Refills: 1 | Status: SHIPPED | OUTPATIENT
Start: 2022-10-12 | End: 2022-10-14 | Stop reason: SDUPTHER

## 2022-10-14 ENCOUNTER — TELEPHONE (OUTPATIENT)
Dept: CARDIOLOGY | Age: 82
End: 2022-10-14

## 2022-10-14 RX ORDER — AMIODARONE HYDROCHLORIDE 200 MG/1
200 TABLET ORAL DAILY
Qty: 90 TABLET | Refills: 1 | Status: SHIPPED | OUTPATIENT
Start: 2022-10-14 | End: 2022-11-02 | Stop reason: ALTCHOICE

## 2022-10-14 RX ORDER — DILTIAZEM HYDROCHLORIDE 120 MG/1
120 CAPSULE, EXTENDED RELEASE ORAL DAILY
Qty: 90 CAPSULE | Refills: 3 | Status: SHIPPED | OUTPATIENT
Start: 2022-10-14

## 2022-10-27 ENCOUNTER — TELEPHONE (OUTPATIENT)
Dept: CARDIOLOGY | Age: 82
End: 2022-10-27

## 2022-11-02 ENCOUNTER — TELEPHONE (OUTPATIENT)
Dept: CARDIOLOGY | Age: 82
End: 2022-11-02

## 2022-11-03 ENCOUNTER — APPOINTMENT (OUTPATIENT)
Dept: GENERAL RADIOLOGY | Facility: HOSPITAL | Age: 82
End: 2022-11-03
Attending: EMERGENCY MEDICINE
Payer: MEDICARE

## 2022-11-03 ENCOUNTER — HOSPITAL ENCOUNTER (INPATIENT)
Facility: HOSPITAL | Age: 82
LOS: 3 days | Discharge: HOME HEALTH | End: 2022-11-07
Attending: EMERGENCY MEDICINE | Admitting: HOSPITALIST
Payer: MEDICARE

## 2022-11-03 ENCOUNTER — HOSPITAL ENCOUNTER (INPATIENT)
Facility: HOSPITAL | Age: 82
LOS: 3 days | Discharge: HOME | End: 2022-11-07
Attending: EMERGENCY MEDICINE | Admitting: HOSPITALIST
Payer: MEDICARE

## 2022-11-03 DIAGNOSIS — I50.9 CONGESTIVE HEART FAILURE, UNSPECIFIED HF CHRONICITY, UNSPECIFIED HEART FAILURE TYPE (HCC): Primary | ICD-10-CM

## 2022-11-03 LAB
ANION GAP SERPL CALC-SCNC: 7 MMOL/L (ref 0–18)
BASOPHILS # BLD AUTO: 0.05 X10(3) UL (ref 0–0.2)
BASOPHILS NFR BLD AUTO: 0.5 %
BUN BLD-MCNC: 29 MG/DL (ref 7–18)
BUN/CREAT SERPL: 19.9 (ref 10–20)
CALCIUM BLD-MCNC: 9.5 MG/DL (ref 8.5–10.1)
CHLORIDE SERPL-SCNC: 103 MMOL/L (ref 98–112)
CO2 SERPL-SCNC: 26 MMOL/L (ref 21–32)
CREAT BLD-MCNC: 1.46 MG/DL
DEPRECATED RDW RBC AUTO: 52.6 FL (ref 35.1–46.3)
EOSINOPHIL # BLD AUTO: 0.27 X10(3) UL (ref 0–0.7)
EOSINOPHIL NFR BLD AUTO: 2.5 %
ERYTHROCYTE [DISTWIDTH] IN BLOOD BY AUTOMATED COUNT: 16.3 % (ref 11–15)
GFR SERPLBLD BASED ON 1.73 SQ M-ARVRAT: 36 ML/MIN/1.73M2 (ref 60–?)
GLUCOSE BLD-MCNC: 119 MG/DL (ref 70–99)
HCT VFR BLD AUTO: 32.8 %
HGB BLD-MCNC: 10.5 G/DL
IMM GRANULOCYTES # BLD AUTO: 0.03 X10(3) UL (ref 0–1)
IMM GRANULOCYTES NFR BLD: 0.3 %
LYMPHOCYTES # BLD AUTO: 1.01 X10(3) UL (ref 1–4)
LYMPHOCYTES NFR BLD AUTO: 9.5 %
MCH RBC QN AUTO: 28 PG (ref 26–34)
MCHC RBC AUTO-ENTMCNC: 32 G/DL (ref 31–37)
MCV RBC AUTO: 87.5 FL
MONOCYTES # BLD AUTO: 0.74 X10(3) UL (ref 0.1–1)
MONOCYTES NFR BLD AUTO: 6.9 %
NEUTROPHILS # BLD AUTO: 8.56 X10 (3) UL (ref 1.5–7.7)
NEUTROPHILS # BLD AUTO: 8.56 X10(3) UL (ref 1.5–7.7)
NEUTROPHILS NFR BLD AUTO: 80.3 %
OSMOLALITY SERPL CALC.SUM OF ELEC: 289 MOSM/KG (ref 275–295)
PLATELET # BLD AUTO: 354 10(3)UL (ref 150–450)
POTASSIUM SERPL-SCNC: 3.7 MMOL/L (ref 3.5–5.1)
RBC # BLD AUTO: 3.75 X10(6)UL
SODIUM SERPL-SCNC: 136 MMOL/L (ref 136–145)
TROPONIN I HIGH SENSITIVITY: 9 NG/L
WBC # BLD AUTO: 10.7 X10(3) UL (ref 4–11)

## 2022-11-03 PROCEDURE — 71045 X-RAY EXAM CHEST 1 VIEW: CPT | Performed by: EMERGENCY MEDICINE

## 2022-11-03 NOTE — ED INITIAL ASSESSMENT (HPI)
Patient arrives via wheelchair through triage with complaints of weakness/fatigue, left arm pain, chest pain, SOB for \"quite a while\" but worsened today.

## 2022-11-04 PROBLEM — I50.9 CONGESTIVE HEART FAILURE, UNSPECIFIED HF CHRONICITY, UNSPECIFIED HEART FAILURE TYPE (HCC): Status: ACTIVE | Noted: 2022-11-04

## 2022-11-04 PROBLEM — I50.9 CONGESTIVE HEART FAILURE (HCC): Status: ACTIVE | Noted: 2022-11-04

## 2022-11-04 LAB
ANION GAP SERPL CALC-SCNC: 12 MMOL/L (ref 0–18)
BUN BLD-MCNC: 29 MG/DL (ref 7–18)
BUN/CREAT SERPL: 18.1 (ref 10–20)
CALCIUM BLD-MCNC: 9.4 MG/DL (ref 8.5–10.1)
CHLORIDE SERPL-SCNC: 102 MMOL/L (ref 98–112)
CO2 SERPL-SCNC: 24 MMOL/L (ref 21–32)
CREAT BLD-MCNC: 1.6 MG/DL
FLUAV + FLUBV RNA SPEC NAA+PROBE: NEGATIVE
FLUAV + FLUBV RNA SPEC NAA+PROBE: NEGATIVE
GFR SERPLBLD BASED ON 1.73 SQ M-ARVRAT: 32 ML/MIN/1.73M2 (ref 60–?)
GLUCOSE BLD-MCNC: 111 MG/DL (ref 70–99)
NT-PROBNP SERPL-MCNC: 2365 PG/ML (ref ?–450)
OSMOLALITY SERPL CALC.SUM OF ELEC: 293 MOSM/KG (ref 275–295)
POTASSIUM SERPL-SCNC: 3.9 MMOL/L (ref 3.5–5.1)
RSV RNA SPEC NAA+PROBE: NEGATIVE
SARS-COV-2 RNA RESP QL NAA+PROBE: NOT DETECTED
SODIUM SERPL-SCNC: 138 MMOL/L (ref 136–145)
TROPONIN I HIGH SENSITIVITY: 10 NG/L

## 2022-11-04 PROCEDURE — 99223 1ST HOSP IP/OBS HIGH 75: CPT | Performed by: HOSPITALIST

## 2022-11-04 RX ORDER — ONDANSETRON 2 MG/ML
4 INJECTION INTRAMUSCULAR; INTRAVENOUS EVERY 6 HOURS PRN
Status: DISCONTINUED | OUTPATIENT
Start: 2022-11-04 | End: 2022-11-07

## 2022-11-04 RX ORDER — MAGNESIUM HYDROXIDE/ALUMINUM HYDROXICE/SIMETHICONE 120; 1200; 1200 MG/30ML; MG/30ML; MG/30ML
30 SUSPENSION ORAL 4 TIMES DAILY PRN
Status: DISCONTINUED | OUTPATIENT
Start: 2022-11-04 | End: 2022-11-07

## 2022-11-04 RX ORDER — FUROSEMIDE 10 MG/ML
40 INJECTION INTRAMUSCULAR; INTRAVENOUS DAILY
Status: DISCONTINUED | OUTPATIENT
Start: 2022-11-04 | End: 2022-11-04

## 2022-11-04 RX ORDER — HYDRALAZINE HYDROCHLORIDE 20 MG/ML
10 INJECTION INTRAMUSCULAR; INTRAVENOUS EVERY 4 HOURS PRN
Status: DISCONTINUED | OUTPATIENT
Start: 2022-11-04 | End: 2022-11-07

## 2022-11-04 RX ORDER — ATORVASTATIN CALCIUM 20 MG/1
20 TABLET, FILM COATED ORAL NIGHTLY
Status: DISCONTINUED | OUTPATIENT
Start: 2022-11-04 | End: 2022-11-07

## 2022-11-04 RX ORDER — METOPROLOL SUCCINATE 50 MG/1
50 TABLET, EXTENDED RELEASE ORAL DAILY
COMMUNITY

## 2022-11-04 RX ORDER — PANTOPRAZOLE SODIUM 40 MG/1
40 TABLET, DELAYED RELEASE ORAL
Status: DISCONTINUED | OUTPATIENT
Start: 2022-11-04 | End: 2022-11-07

## 2022-11-04 RX ORDER — FUROSEMIDE 10 MG/ML
40 INJECTION INTRAMUSCULAR; INTRAVENOUS ONCE
Status: COMPLETED | OUTPATIENT
Start: 2022-11-04 | End: 2022-11-04

## 2022-11-04 RX ORDER — ACETAMINOPHEN 325 MG/1
650 TABLET ORAL EVERY 4 HOURS PRN
Status: DISCONTINUED | OUTPATIENT
Start: 2022-11-04 | End: 2022-11-07

## 2022-11-04 RX ORDER — LEVOTHYROXINE SODIUM 0.05 MG/1
50 TABLET ORAL
Status: DISCONTINUED | OUTPATIENT
Start: 2022-11-04 | End: 2022-11-07

## 2022-11-04 RX ORDER — METOPROLOL TARTRATE 5 MG/5ML
5 INJECTION INTRAVENOUS ONCE AS NEEDED
Status: ACTIVE | OUTPATIENT
Start: 2022-11-04 | End: 2022-11-04

## 2022-11-04 RX ORDER — ZOLPIDEM TARTRATE 5 MG/1
5 TABLET ORAL NIGHTLY PRN
Status: DISCONTINUED | OUTPATIENT
Start: 2022-11-04 | End: 2022-11-07

## 2022-11-04 RX ORDER — POTASSIUM CHLORIDE 750 MG/1
10 TABLET, EXTENDED RELEASE ORAL 2 TIMES DAILY
Status: DISCONTINUED | OUTPATIENT
Start: 2022-11-04 | End: 2022-11-07

## 2022-11-04 RX ORDER — DILTIAZEM HYDROCHLORIDE 120 MG/1
120 CAPSULE, EXTENDED RELEASE ORAL NIGHTLY
Status: DISCONTINUED | OUTPATIENT
Start: 2022-11-04 | End: 2022-11-07

## 2022-11-04 RX ORDER — METOPROLOL SUCCINATE 50 MG/1
50 TABLET, EXTENDED RELEASE ORAL DAILY
Status: DISCONTINUED | OUTPATIENT
Start: 2022-11-04 | End: 2022-11-05

## 2022-11-04 RX ORDER — ASPIRIN 81 MG/1
81 TABLET, CHEWABLE ORAL DAILY
Status: DISCONTINUED | OUTPATIENT
Start: 2022-11-04 | End: 2022-11-07

## 2022-11-04 RX ORDER — DILTIAZEM HYDROCHLORIDE 120 MG/1
120 CAPSULE, EXTENDED RELEASE ORAL DAILY
Status: DISCONTINUED | OUTPATIENT
Start: 2022-11-04 | End: 2022-11-04

## 2022-11-04 RX ORDER — MELATONIN
325 2 TIMES DAILY WITH MEALS
Status: DISCONTINUED | OUTPATIENT
Start: 2022-11-04 | End: 2022-11-07

## 2022-11-04 RX ORDER — DIGOXIN 0.25 MG/ML
250 INJECTION INTRAMUSCULAR; INTRAVENOUS ONCE
Status: DISCONTINUED | OUTPATIENT
Start: 2022-11-04 | End: 2022-11-04

## 2022-11-04 RX ORDER — FUROSEMIDE 10 MG/ML
40 INJECTION INTRAMUSCULAR; INTRAVENOUS
Status: DISCONTINUED | OUTPATIENT
Start: 2022-11-04 | End: 2022-11-05

## 2022-11-04 RX ORDER — MAGNESIUM OXIDE 400 MG/1
400 TABLET ORAL 2 TIMES DAILY
Status: DISCONTINUED | OUTPATIENT
Start: 2022-11-04 | End: 2022-11-07

## 2022-11-04 NOTE — PROGRESS NOTES
Pt converted to A-fib, HR in 120's, Pt is asymptomatic. Cardiac APRN at bedside, reviewed cardiac monitor. Order to IV lopressor once PRN placed. Unable to give at this time as BP <110. APRN aware. Will recheck BP    1140- pt converted back to NSR HR in 70's. 101 Riboxx notified.      Faizan Smith RN

## 2022-11-04 NOTE — DISCHARGE INSTRUCTIONS
Sometimes managing your health at home requires assistance. The [facility] team has recognized your preference to use [facility]. They can be reached by phone at [phone]. The fax number for your reference is [phone]. A representative from the home health agency will contact you or your family to schedule your first visit.       Ludmila Cherry RN  [facility] Liaison  [phone]    [address] [facility]) - home oxygen  [facility], [address]   Phone: [phone]

## 2022-11-04 NOTE — PLAN OF CARE
AO4, up with assistance. PT/OT to see. Echo in am. IV lasix. C/O some cramping to legs. PRN tylenol given. Denies sob at this time. Problem: CARDIOVASCULAR - ADULT  Goal: Maintains optimal cardiac output and hemodynamic stability  Description: INTERVENTIONS:  - Monitor vital signs, rhythm, and trends  - Monitor for bleeding, hypotension and signs of decreased cardiac output  - Evaluate effectiveness of vasoactive medications to optimize hemodynamic stability  - Monitor arterial and/or venous puncture sites for bleeding and/or hematoma  - Assess quality of pulses, skin color and temperature  - Assess for signs of decreased coronary artery perfusion - ex.  Angina  - Evaluate fluid balance, assess for edema, trend weights  Outcome: Not Progressing  Goal: Absence of cardiac arrhythmias or at baseline  Description: INTERVENTIONS:  - Continuous cardiac monitoring, monitor vital signs, obtain 12 lead EKG if indicated  - Evaluate effectiveness of antiarrhythmic and heart rate control medications as ordered  - Initiate emergency measures for life threatening arrhythmias  - Monitor electrolytes and administer replacement therapy as ordered  Outcome: Not Progressing

## 2022-11-04 NOTE — ED QUICK NOTES
Orders for admission, patient is aware of plan and ready to go upstairs.  Any questions, please call ED RN Rocky Zavala  at extension [phone]     Patient Covid vaccination status: Unvaccinated     COVID Test Ordered in ED: SARS-CoV-2/Flu A and B/RSV by PCR (GeneXpert)    COVID Suspicion at Admission: N/A    Running Infusions:  None    Mental Status/LOC at time of transport: A&Ox4    Other pertinent information:   CIWA score: N/A   NIH score:  N/A

## 2022-11-04 NOTE — PLAN OF CARE
Pt received in no acute distress no c/o, reports breathing \"feels better\". On RA pt 91-95%. Pt desaturates to 87-89% w/ exertion. Reports no SOB, placed on 2 L NC with ambulation, plan is IV lasix, ECHO. Fall/safety precautions in place, call light within pt reach, hourly rounding maintained. Problem: Patient Centered Care  Goal: Patient preferences are identified and integrated in the patient's plan of care  Description: Interventions:  - What would you like us to know as we care for you?  From home alone  - Provide timely, complete, and accurate information to patient/family  - Incorporate patient and family knowledge, values, beliefs, and cultural backgrounds into the planning and delivery of care  - Encourage patient/family to participate in care and decision-making at the level they choose  - Honor patient and family perspectives and choices  Outcome: Progressing     Problem: Patient/Family Goals  Goal: Patient/Family Long Term Goal  Description: Patient's Long Term Goal: to take better care of my health    Interventions:  - fluid restriction  - diuretics per order  - encourage ambulation  - CHF teaching  - See additional Care Plan goals for specific interventions  Outcome: Progressing  Goal: Patient/Family Short Term Goal  Description: Patient's Short Term Goal: to breathe better    Interventions:   - oxygen PRN  - encourage cough and deep breath  -diuretics per order  - fluid restriction  - cardiac diet  - encourage ambulation  - See additional Care Plan goals for specific interventions  Outcome: Progressing     Problem: CARDIOVASCULAR - ADULT  Goal: Maintains optimal cardiac output and hemodynamic stability  Description: INTERVENTIONS:  - Monitor vital signs, rhythm, and trends  - Monitor for bleeding, hypotension and signs of decreased cardiac output  - Evaluate effectiveness of vasoactive medications to optimize hemodynamic stability  - Monitor arterial and/or venous puncture sites for bleeding and/or hematoma  - Assess quality of pulses, skin color and temperature  - Assess for signs of decreased coronary artery perfusion - ex.  Angina  - Evaluate fluid balance, assess for edema, trend weights  Outcome: Progressing  Goal: Absence of cardiac arrhythmias or at baseline  Description: INTERVENTIONS:  - Continuous cardiac monitoring, monitor vital signs, obtain 12 lead EKG if indicated  - Evaluate effectiveness of antiarrhythmic and heart rate control medications as ordered  - Initiate emergency measures for life threatening arrhythmias  - Monitor electrolytes and administer replacement therapy as ordered  Outcome: Progressing

## 2022-11-04 NOTE — CM/SW NOTE
11/04/22 1000   CM/SW Referral Data   Referral Source Social Work (self-referral)   Reason for Referral Discharge planning   Informant Patient   Pertinent Medical Hx   Does patient have an established PCP? Yes  Thu Holley)   Patient Info   Patient's Home Environment Condo/Apt no elevator   Number of Levels in Home 1   Number of Stair in Home 0   Patient lives with Alone   Patient Status Prior to Admission   Independent with ADLs and Mobility Yes   Services in place prior to admission DME/Supplies at home   Type of DME/Supplies Standard Walker   Discharge Needs   Anticipated D/C needs Home health care   Choice of Post-Acute Provider   Informed patient of right to choose their preferred provider Yes   List of appropriate post-acute services provided to patient/family with quality data   ([facility] pending in [facility] - needs f/up)     10:30AM  SW self referred pt for DC Planning after RN rounds report that pt will have PT/OT on consult. SW met w/ pt in her room. Above assessment completed. Pt reports having a walker at home but does not use it as it is difficult to maneuver in her Apt. Pt reports \"wall walking. \"    Pt has no hx w/ HH or [facility]. SW discussed possible HH or [facility] recommendations pending medical course and PT/OT evals. Pt is agreeable and open to [facility] services but prefers not to DC to [facility]. SW requested [address] [place] [address] send [facility] referrals via Aidin. F2F entered. Will need f/up to provide [facility] list and obtain agency choice. 03:25PM  Per chart review, PT recommends home w/ HH at [place]. Notified by Neo Alvarez w/ [facility] - pt accepted, provided choice list, and has chosen [facility] for services at [place]. PLAN: Home w/ [facility] - pending med clear     SW/CM to remain available for support and/or discharge planning.          Judy Timo, MSW, [address] [facility]

## 2022-11-04 NOTE — HOME CARE LIAISON
Patient met with and provided with list of [facility] providers from [place], patient choice is [address]. Agency reserved in [place] and contact information placed on AVS. Financial interest disclosure provided to patient. MARVIN Pereira udproseanne.

## 2022-11-04 NOTE — CM/SW NOTE
Department  notified of request for [facility], [facility]in referrals started. Assigned CM/SW to follow up with pt/family on further discharge planning.      Lidia Collins   November 04, 2022   10:27

## 2022-11-05 ENCOUNTER — APPOINTMENT (OUTPATIENT)
Dept: GENERAL RADIOLOGY | Facility: HOSPITAL | Age: 82
End: 2022-11-05
Attending: HOSPITALIST
Payer: MEDICARE

## 2022-11-05 ENCOUNTER — APPOINTMENT (OUTPATIENT)
Dept: CV DIAGNOSTICS | Facility: HOSPITAL | Age: 82
End: 2022-11-05
Attending: HOSPITALIST
Payer: MEDICARE

## 2022-11-05 LAB
ANION GAP SERPL CALC-SCNC: 9 MMOL/L (ref 0–18)
BASOPHILS # BLD AUTO: 0.06 X10(3) UL (ref 0–0.2)
BASOPHILS NFR BLD AUTO: 0.5 %
BUN BLD-MCNC: 34 MG/DL (ref 7–18)
BUN/CREAT SERPL: 21.9 (ref 10–20)
CALCIUM BLD-MCNC: 9.4 MG/DL (ref 8.5–10.1)
CHLORIDE SERPL-SCNC: 103 MMOL/L (ref 98–112)
CO2 SERPL-SCNC: 25 MMOL/L (ref 21–32)
CREAT BLD-MCNC: 1.55 MG/DL
DEPRECATED RDW RBC AUTO: 52.9 FL (ref 35.1–46.3)
EOSINOPHIL # BLD AUTO: 0.46 X10(3) UL (ref 0–0.7)
EOSINOPHIL NFR BLD AUTO: 4.2 %
ERYTHROCYTE [DISTWIDTH] IN BLOOD BY AUTOMATED COUNT: 16.5 % (ref 11–15)
GFR SERPLBLD BASED ON 1.73 SQ M-ARVRAT: 33 ML/MIN/1.73M2 (ref 60–?)
GLUCOSE BLD-MCNC: 107 MG/DL (ref 70–99)
HCT VFR BLD AUTO: 32.3 %
HGB BLD-MCNC: 10.4 G/DL
IMM GRANULOCYTES # BLD AUTO: 0.04 X10(3) UL (ref 0–1)
IMM GRANULOCYTES NFR BLD: 0.4 %
LYMPHOCYTES # BLD AUTO: 1.36 X10(3) UL (ref 1–4)
LYMPHOCYTES NFR BLD AUTO: 12.3 %
MCH RBC QN AUTO: 28.3 PG (ref 26–34)
MCHC RBC AUTO-ENTMCNC: 32.2 G/DL (ref 31–37)
MCV RBC AUTO: 87.8 FL
MONOCYTES # BLD AUTO: 1.03 X10(3) UL (ref 0.1–1)
MONOCYTES NFR BLD AUTO: 9.3 %
NEUTROPHILS # BLD AUTO: 8.13 X10 (3) UL (ref 1.5–7.7)
NEUTROPHILS # BLD AUTO: 8.13 X10(3) UL (ref 1.5–7.7)
NEUTROPHILS NFR BLD AUTO: 73.3 %
OSMOLALITY SERPL CALC.SUM OF ELEC: 292 MOSM/KG (ref 275–295)
PLATELET # BLD AUTO: 322 10(3)UL (ref 150–450)
POTASSIUM SERPL-SCNC: 4.1 MMOL/L (ref 3.5–5.1)
RBC # BLD AUTO: 3.68 X10(6)UL
SODIUM SERPL-SCNC: 137 MMOL/L (ref 136–145)
WBC # BLD AUTO: 11.1 X10(3) UL (ref 4–11)

## 2022-11-05 PROCEDURE — 93306 TTE W/DOPPLER COMPLETE: CPT | Performed by: HOSPITALIST

## 2022-11-05 PROCEDURE — 71045 X-RAY EXAM CHEST 1 VIEW: CPT | Performed by: HOSPITALIST

## 2022-11-05 PROCEDURE — 99233 SBSQ HOSP IP/OBS HIGH 50: CPT | Performed by: HOSPITALIST

## 2022-11-05 RX ORDER — FUROSEMIDE 40 MG/1
40 TABLET ORAL DAILY
Status: DISCONTINUED | OUTPATIENT
Start: 2022-11-05 | End: 2022-11-07

## 2022-11-05 RX ORDER — ASPIRIN 81 MG/1
81 TABLET, CHEWABLE ORAL DAILY
Qty: 30 TABLET | Refills: 2 | Status: SHIPPED | OUTPATIENT
Start: 2022-11-06

## 2022-11-05 RX ORDER — METOPROLOL TARTRATE 5 MG/5ML
5 INJECTION INTRAVENOUS EVERY 6 HOURS PRN
Status: DISCONTINUED | OUTPATIENT
Start: 2022-11-05 | End: 2022-11-07

## 2022-11-05 RX ORDER — FUROSEMIDE 40 MG/1
40 TABLET ORAL DAILY
Qty: 30 TABLET | Refills: 2 | Status: SHIPPED | OUTPATIENT
Start: 2022-11-06 | End: 2022-11-07

## 2022-11-05 RX ORDER — METOPROLOL TARTRATE 5 MG/5ML
5 INJECTION INTRAVENOUS EVERY 6 HOURS
Status: DISCONTINUED | OUTPATIENT
Start: 2022-11-05 | End: 2022-11-05

## 2022-11-05 RX ORDER — METOPROLOL SUCCINATE 50 MG/1
50 TABLET, EXTENDED RELEASE ORAL DAILY
Status: DISCONTINUED | OUTPATIENT
Start: 2022-11-06 | End: 2022-11-06

## 2022-11-05 RX ORDER — DILTIAZEM HYDROCHLORIDE 120 MG/1
120 CAPSULE, COATED, EXTENDED RELEASE ORAL NIGHTLY
Qty: 30 CAPSULE | Refills: 2 | Status: SHIPPED | OUTPATIENT
Start: 2022-11-05

## 2022-11-05 NOTE — PLAN OF CARE
AO4, up with assistance. IV lasix adjusted per MD.  Oral medications for afib control. Echo to be done in AM.  Denies pain or sob at this time     Problem: CARDIOVASCULAR - ADULT  Goal: Maintains optimal cardiac output and hemodynamic stability  Description: INTERVENTIONS:  - Monitor vital signs, rhythm, and trends  - Monitor for bleeding, hypotension and signs of decreased cardiac output  - Evaluate effectiveness of vasoactive medications to optimize hemodynamic stability  - Monitor arterial and/or venous puncture sites for bleeding and/or hematoma  - Assess quality of pulses, skin color and temperature  - Assess for signs of decreased coronary artery perfusion - ex.  Angina  - Evaluate fluid balance, assess for edema, trend weights  Outcome: Not Progressing  Goal: Absence of cardiac arrhythmias or at baseline  Description: INTERVENTIONS:  - Continuous cardiac monitoring, monitor vital signs, obtain 12 lead EKG if indicated  - Evaluate effectiveness of antiarrhythmic and heart rate control medications as ordered  - Initiate emergency measures for life threatening arrhythmias  - Monitor electrolytes and administer replacement therapy as ordered  Outcome: Not Progressing

## 2022-11-05 NOTE — PROGRESS NOTES
Patient's O2 sat on room air is 90% at rest. Pt's O2 sat on room is 87% when ambulating, and 90% on 2 liter while ambulating.      Nikki Alegria RN

## 2022-11-05 NOTE — PLAN OF CARE
Pt received in no acute distress, no c/o, O2 stats in low 90's at rest, pt desaturating with activity requiring O2. Plan for pulmonary consult. Problem: Patient Centered Care  Goal: Patient preferences are identified and integrated in the patient's plan of care  Description: Interventions:  - What would you like us to know as we care for you? From home alone  - Provide timely, complete, and accurate information to patient/family  - Incorporate patient and family knowledge, values, beliefs, and cultural backgrounds into the planning and delivery of care  - Encourage patient/family to participate in care and decision-making at the level they choose  - Honor patient and family perspectives and choices  Outcome: Progressing     Problem: Patient/Family Goals  Goal: Patient/Family Long Term Goal  Description: Patient's Long Term Goal: to be discharged home safely   Interventions:  - follow MD orders  -wean oxygen  - See additional Care Plan goals for specific interventions  Outcome: Progressing  Goal: Patient/Family Short Term Goal  Description: Patient's Short Term Goal: not require oxygen  Interventions:   - cough deep breathe  - pulmonary consult  -encourage ambulation  - wean oxygen as tolerated  - See additional Care Plan goals for specific interventions  Outcome: Progressing     Problem: CARDIOVASCULAR - ADULT  Goal: Maintains optimal cardiac output and hemodynamic stability  Description: INTERVENTIONS:  - Monitor vital signs, rhythm, and trends  - Monitor for bleeding, hypotension and signs of decreased cardiac output  - Evaluate effectiveness of vasoactive medications to optimize hemodynamic stability  - Monitor arterial and/or venous puncture sites for bleeding and/or hematoma  - Assess quality of pulses, skin color and temperature  - Assess for signs of decreased coronary artery perfusion - ex.  Angina  - Evaluate fluid balance, assess for edema, trend weights  Outcome: Progressing  Goal: Absence of cardiac arrhythmias or at baseline  Description: INTERVENTIONS:  - Continuous cardiac monitoring, monitor vital signs, obtain 12 lead EKG if indicated  - Evaluate effectiveness of antiarrhythmic and heart rate control medications as ordered  - Initiate emergency measures for life threatening arrhythmias  - Monitor electrolytes and administer replacement therapy as ordered  Outcome: Progressing

## 2022-11-06 LAB
ANION GAP SERPL CALC-SCNC: 8 MMOL/L (ref 0–18)
BASOPHILS # BLD AUTO: 0.04 X10(3) UL (ref 0–0.2)
BASOPHILS NFR BLD AUTO: 0.4 %
BUN BLD-MCNC: 30 MG/DL (ref 7–18)
BUN/CREAT SERPL: 20.4 (ref 10–20)
CALCIUM BLD-MCNC: 9.1 MG/DL (ref 8.5–10.1)
CHLORIDE SERPL-SCNC: 103 MMOL/L (ref 98–112)
CO2 SERPL-SCNC: 25 MMOL/L (ref 21–32)
CREAT BLD-MCNC: 1.47 MG/DL
DEPRECATED RDW RBC AUTO: 54.7 FL (ref 35.1–46.3)
EOSINOPHIL # BLD AUTO: 0.35 X10(3) UL (ref 0–0.7)
EOSINOPHIL NFR BLD AUTO: 3.9 %
ERYTHROCYTE [DISTWIDTH] IN BLOOD BY AUTOMATED COUNT: 16.5 % (ref 11–15)
GFR SERPLBLD BASED ON 1.73 SQ M-ARVRAT: 35 ML/MIN/1.73M2 (ref 60–?)
GLUCOSE BLD-MCNC: 107 MG/DL (ref 70–99)
HCT VFR BLD AUTO: 34.7 %
HGB BLD-MCNC: 10.7 G/DL
IMM GRANULOCYTES # BLD AUTO: 0.03 X10(3) UL (ref 0–1)
IMM GRANULOCYTES NFR BLD: 0.3 %
LYMPHOCYTES # BLD AUTO: 0.87 X10(3) UL (ref 1–4)
LYMPHOCYTES NFR BLD AUTO: 9.6 %
MCH RBC QN AUTO: 28 PG (ref 26–34)
MCHC RBC AUTO-ENTMCNC: 30.8 G/DL (ref 31–37)
MCV RBC AUTO: 90.8 FL
MONOCYTES # BLD AUTO: 0.6 X10(3) UL (ref 0.1–1)
MONOCYTES NFR BLD AUTO: 6.6 %
NEUTROPHILS # BLD AUTO: 7.2 X10 (3) UL (ref 1.5–7.7)
NEUTROPHILS # BLD AUTO: 7.2 X10(3) UL (ref 1.5–7.7)
NEUTROPHILS NFR BLD AUTO: 79.2 %
OSMOLALITY SERPL CALC.SUM OF ELEC: 289 MOSM/KG (ref 275–295)
PLATELET # BLD AUTO: 339 10(3)UL (ref 150–450)
POTASSIUM SERPL-SCNC: 4.3 MMOL/L (ref 3.5–5.1)
RBC # BLD AUTO: 3.82 X10(6)UL
SODIUM SERPL-SCNC: 136 MMOL/L (ref 136–145)
WBC # BLD AUTO: 9.1 X10(3) UL (ref 4–11)

## 2022-11-06 PROCEDURE — 99223 1ST HOSP IP/OBS HIGH 75: CPT | Performed by: INTERNAL MEDICINE

## 2022-11-06 PROCEDURE — 99233 SBSQ HOSP IP/OBS HIGH 50: CPT | Performed by: HOSPITALIST

## 2022-11-06 RX ORDER — METOPROLOL SUCCINATE 50 MG/1
50 TABLET, EXTENDED RELEASE ORAL DAILY
Status: DISCONTINUED | OUTPATIENT
Start: 2022-11-06 | End: 2022-11-07

## 2022-11-06 RX ORDER — GUAIFENESIN 600 MG/1
600 TABLET, EXTENDED RELEASE ORAL 2 TIMES DAILY
Status: DISCONTINUED | OUTPATIENT
Start: 2022-11-06 | End: 2022-11-07

## 2022-11-06 NOTE — PLAN OF CARE
Patient A&Ox4, up with assist. Weaned down to room air, will monitor over night and repeat walk test in AM. Plan to DC home if no events overnight. Plan of care reviewed at the bedside. Patient instructed to call for assistance as needed. Problem: Patient Centered Care  Goal: Patient preferences are identified and integrated in the patient's plan of care  Description: Interventions:  - What would you like us to know as we care for you?  - Provide timely, complete, and accurate information to patient/family  - Incorporate patient and family knowledge, values, beliefs, and cultural backgrounds into the planning and delivery of care  - Encourage patient/family to participate in care and decision-making at the level they choose  - Honor patient and family perspectives and choices  Outcome: Progressing     Problem: Patient/Family Goals  Goal: Patient/Family Long Term Goal  Description: Patient's Long Term Goal:    Interventions:  -  - See additional Care Plan goals for specific interventions  Outcome: Progressing  Goal: Patient/Family Short Term Goal  Description: Patient's Short Term Goal:     Interventions:   -  - See additional Care Plan goals for specific interventions  Outcome: Progressing     Problem: CARDIOVASCULAR - ADULT  Goal: Maintains optimal cardiac output and hemodynamic stability  Description: INTERVENTIONS:  - Monitor vital signs, rhythm, and trends  - Monitor for bleeding, hypotension and signs of decreased cardiac output  - Evaluate effectiveness of vasoactive medications to optimize hemodynamic stability  - Monitor arterial and/or venous puncture sites for bleeding and/or hematoma  - Assess quality of pulses, skin color and temperature  - Assess for signs of decreased coronary artery perfusion - ex.  Angina  - Evaluate fluid balance, assess for edema, trend weights  Outcome: Progressing  Goal: Absence of cardiac arrhythmias or at baseline  Description: INTERVENTIONS:  - Continuous cardiac monitoring, monitor vital signs, obtain 12 lead EKG if indicated  - Evaluate effectiveness of antiarrhythmic and heart rate control medications as ordered  - Initiate emergency measures for life threatening arrhythmias  - Monitor electrolytes and administer replacement therapy as ordered  Outcome: Progressing

## 2022-11-06 NOTE — PLAN OF CARE
AO4, up with assistance. EF of 55%. Transitioned to oral lasix for now. Wean O2 as tolerated. Pulmonology on consult. Will continue to monitor for now. Problem: CARDIOVASCULAR - ADULT  Goal: Maintains optimal cardiac output and hemodynamic stability  Description: INTERVENTIONS:  - Monitor vital signs, rhythm, and trends  - Monitor for bleeding, hypotension and signs of decreased cardiac output  - Evaluate effectiveness of vasoactive medications to optimize hemodynamic stability  - Monitor arterial and/or venous puncture sites for bleeding and/or hematoma  - Assess quality of pulses, skin color and temperature  - Assess for signs of decreased coronary artery perfusion - ex.  Angina  - Evaluate fluid balance, assess for edema, trend weights  Outcome: Not Progressing  Goal: Absence of cardiac arrhythmias or at baseline  Description: INTERVENTIONS:  - Continuous cardiac monitoring, monitor vital signs, obtain 12 lead EKG if indicated  - Evaluate effectiveness of antiarrhythmic and heart rate control medications as ordered  - Initiate emergency measures for life threatening arrhythmias  - Monitor electrolytes and administer replacement therapy as ordered  Outcome: Not Progressing

## 2022-11-06 NOTE — PROGRESS NOTES
Oxygen Walk results:      SPO2% on Room Air at Rest: 91 (11/06/22 1349)  SPO2% on Oxygen at Rest: 95 (11/06/22 1349)  At rest oxygen flow (liters per minute): 2 (11/06/22 1349)  SPO2% Ambulation on Room Air: 90 (11/06/22 1349)  SPO2% Ambulation on Oxygen: 94 (11/06/22 1349)  Ambulation oxygen flow (liters per minute): 2 (11/06/22 1349).

## 2022-11-07 VITALS
BODY MASS INDEX: 27.35 KG/M2 | OXYGEN SATURATION: 93 % | HEIGHT: 61 IN | DIASTOLIC BLOOD PRESSURE: 65 MMHG | WEIGHT: 144.88 LBS | RESPIRATION RATE: 18 BRPM | SYSTOLIC BLOOD PRESSURE: 97 MMHG | TEMPERATURE: 98 F | HEART RATE: 77 BPM

## 2022-11-07 LAB
ANION GAP SERPL CALC-SCNC: 6 MMOL/L (ref 0–18)
BUN BLD-MCNC: 25 MG/DL (ref 7–18)
BUN/CREAT SERPL: 18.4 (ref 10–20)
CALCIUM BLD-MCNC: 9.5 MG/DL (ref 8.5–10.1)
CHLORIDE SERPL-SCNC: 102 MMOL/L (ref 98–112)
CO2 SERPL-SCNC: 25 MMOL/L (ref 21–32)
CREAT BLD-MCNC: 1.36 MG/DL
GFR SERPLBLD BASED ON 1.73 SQ M-ARVRAT: 39 ML/MIN/1.73M2 (ref 60–?)
GLUCOSE BLD-MCNC: 120 MG/DL (ref 70–99)
OSMOLALITY SERPL CALC.SUM OF ELEC: 282 MOSM/KG (ref 275–295)
POTASSIUM SERPL-SCNC: 4.7 MMOL/L (ref 3.5–5.1)
SODIUM SERPL-SCNC: 133 MMOL/L (ref 136–145)

## 2022-11-07 PROCEDURE — 99233 SBSQ HOSP IP/OBS HIGH 50: CPT | Performed by: INTERNAL MEDICINE

## 2022-11-07 PROCEDURE — 99239 HOSP IP/OBS DSCHRG MGMT >30: CPT | Performed by: HOSPITALIST

## 2022-11-07 RX ORDER — TORSEMIDE 20 MG/1
20 TABLET ORAL
Status: DISCONTINUED | OUTPATIENT
Start: 2022-11-07 | End: 2022-11-07

## 2022-11-07 RX ORDER — METOPROLOL SUCCINATE 50 MG/1
50 TABLET, EXTENDED RELEASE ORAL ONCE
Status: DISCONTINUED | OUTPATIENT
Start: 2022-11-07 | End: 2022-11-07

## 2022-11-07 RX ORDER — METOPROLOL SUCCINATE 100 MG/1
100 TABLET, EXTENDED RELEASE ORAL DAILY
Status: DISCONTINUED | OUTPATIENT
Start: 2022-11-08 | End: 2022-11-07

## 2022-11-07 NOTE — CM/SW NOTE
DC order placed. [facility] liaison updated. @3PM  Pt needs home o2 at DC. Walk test and order complete. Referral to [facility]/liaison aware. SW to deliver tanks once approved. SW placed [employer] in AVS.     @3:15PM  Liaison confirmed SW can dispense home O2 tank. SW provided 1 tank to [facility]. PLAN: Home w/ [facility] (home O2 @ 2L)    SW remains available for support and/or discharge planning. Please do not hesitate to call/chat SW if further DC needs arise.      Joce BAHENA, [place], [place]   Ext [phone]

## 2022-11-07 NOTE — PLAN OF CARE
Patient's O2 sat on room air is 90% at rest. Pt's O2 sat on room air is 87% when ambulating, and 94% on 2 liter while ambulating.

## 2022-11-07 NOTE — PLAN OF CARE
Bed low and locked, side rails up x2, call light within reach and pt educated to call when needing assistance. O2 walk completed. Pt qualified for home O2. HR elevated this am. Scheduled metoprolol given per [facility] and rates better controlled. Blood pressure soft. MD aware. Order to hold lasix and second dose of metoprolol. Home O2 delivered and pt educated on use and contact information for setting up home O2. Pt discharged to home. PIV and tele removed. All questions answered. Pt escorted out via wheelchair. Problem: Patient Centered Care  Goal: Patient preferences are identified and integrated in the patient's plan of care  Description: Interventions:  - What would you like us to know as we care for you? I live at home alone  - Provide timely, complete, and accurate information to patient/family  - Incorporate patient and family knowledge, values, beliefs, and cultural backgrounds into the planning and delivery of care  - Encourage patient/family to participate in care and decision-making at the level they choose  - Honor patient and family perspectives and choices  Outcome: Completed     Problem: Patient/Family Goals  Goal: Patient/Family Long Term Goal  Description: Patient's Long Term Goal: Breathe better    Interventions:  - Breathing exercises. - Home O2  - See additional Care Plan goals for specific interventions  Outcome: Completed  Goal: Patient/Family Short Term Goal  Description: Patient's Short Term Goal: Go home.     Interventions:   - Pt cleared for discharge  - See additional Care Plan goals for specific interventions  Outcome: Completed     Problem: CARDIOVASCULAR - ADULT  Goal: Maintains optimal cardiac output and hemodynamic stability  Description: INTERVENTIONS:  - Monitor vital signs, rhythm, and trends  - Monitor for bleeding, hypotension and signs of decreased cardiac output  - Evaluate effectiveness of vasoactive medications to optimize hemodynamic stability  - Monitor arterial and/or venous puncture sites for bleeding and/or hematoma  - Assess quality of pulses, skin color and temperature  - Assess for signs of decreased coronary artery perfusion - ex.  Angina  - Evaluate fluid balance, assess for edema, trend weights  Outcome: Completed  Goal: Absence of cardiac arrhythmias or at baseline  Description: INTERVENTIONS:  - Continuous cardiac monitoring, monitor vital signs, obtain 12 lead EKG if indicated  - Evaluate effectiveness of antiarrhythmic and heart rate control medications as ordered  - Initiate emergency measures for life threatening arrhythmias  - Monitor electrolytes and administer replacement therapy as ordered  Outcome: Completed

## 2022-11-09 ENCOUNTER — TELEPHONE (OUTPATIENT)
Dept: PULMONOLOGY | Facility: CLINIC | Age: 82
End: 2022-11-09

## 2022-11-09 NOTE — TELEPHONE ENCOUNTER
Daughter called in stating pt was recently released from the hospital and needs 2 week follow up appt. The schedule is booked.  Please call

## 2022-11-09 NOTE — TELEPHONE ENCOUNTER
Spoke with patient's daughter, Carlos Kan, [facility] follow up appointment scheduled with Dr. Roger Patel on 12/1/22 at 3:50pm.  Verified date, time, location & parking, daughter verbalized understanding.

## 2022-11-17 ENCOUNTER — APPOINTMENT (OUTPATIENT)
Dept: CARDIOLOGY | Age: 82
End: 2022-11-17

## 2022-11-21 ENCOUNTER — APPOINTMENT (OUTPATIENT)
Dept: CARDIOLOGY | Age: 82
End: 2022-11-21

## 2022-11-28 ENCOUNTER — TELEPHONE (OUTPATIENT)
Dept: CARDIOLOGY | Age: 82
End: 2022-11-28

## 2022-12-01 ENCOUNTER — OFFICE VISIT (OUTPATIENT)
Dept: PULMONOLOGY | Facility: CLINIC | Age: 82
End: 2022-12-01
Payer: MEDICARE

## 2022-12-01 VITALS
SYSTOLIC BLOOD PRESSURE: 102 MMHG | RESPIRATION RATE: 14 BRPM | BODY MASS INDEX: 25.76 KG/M2 | WEIGHT: 140 LBS | DIASTOLIC BLOOD PRESSURE: 64 MMHG | HEIGHT: 62 IN | HEART RATE: 69 BPM | OXYGEN SATURATION: 96 %

## 2022-12-01 DIAGNOSIS — R06.00 DYSPNEA, UNSPECIFIED TYPE: Primary | ICD-10-CM

## 2022-12-01 PROCEDURE — 99213 OFFICE O/P EST LOW 20 MIN: CPT | Performed by: INTERNAL MEDICINE

## 2022-12-01 PROCEDURE — 1126F AMNT PAIN NOTED NONE PRSNT: CPT | Performed by: INTERNAL MEDICINE

## 2022-12-01 RX ORDER — POTASSIUM CHLORIDE 1.5 G/1.77G
20 POWDER, FOR SOLUTION ORAL 2 TIMES DAILY
COMMUNITY

## 2022-12-01 NOTE — PROGRESS NOTES
Referring Physician  Acacia Coronado MD    History of Present Illness  Yakov Garza presents today for follow-up visit after recent hospitalization 2022. Patient with evidence of hypoxia with evidence of diastolic CHF. Overall dyspnea improved since discharge currently using 1 L oxygen at home. Denies significant cough. Denies significant wheezing. Medications  dilTIAZem ER (DILTIAZEM CD) 120 MG Oral Capsule SR 24 Hr, Take 1 capsule (120 mg total) by mouth at bedtime. , Disp: 30 capsule, Rfl: 2  aspirin 81 MG Oral Chew Tab, Chew 1 tablet (81 mg total) by mouth in the morning., Disp: 30 tablet, Rfl: 2  metoprolol succinate ER 50 MG Oral Tablet 24 Hr, Take 50 mg by mouth in the morning., Disp: , Rfl:   Levothyroxine Sodium 50 MCG Oral Tab, Take 1 tablet (50 mcg total) by mouth before breakfast., Disp: 90 tablet, Rfl: 1  omeprazole 20 MG Oral Capsule Delayed Release, Take 1 capsule (20 mg total) by mouth daily. , Disp: 90 capsule, Rfl: 1  torsemide 20 MG Oral Tab, Take 1 tablet (20 mg total) by mouth 2 (two) times daily. , Disp: 180 tablet, Rfl: 1  KLOR-CON M20 20 MEQ Oral Tab CR, TAKE 1 TABLET TWICE A DAY (Patient taking differently: 10 mEq 2 (two) times daily.), Disp: 180 tablet, Rfl: 3  atorvastatin 20 MG Oral Tab, Take 1 tablet (20 mg total) by mouth nightly., Disp: 90 tablet, Rfl: 1  magnesium oxide 400 MG Oral Tab, Take 1 tablet (400 mg total) by mouth 2 (two) times daily. , Disp: 180 tablet, Rfl: 1  apixaban 2.5 MG Oral Tab, Take 1 tablet (2.5 mg total) by mouth 2 (two) times daily. , Disp: 180 tablet, Rfl: 1  ferrous sulfate 325 (65 FE) MG Oral Tab EC, Take 1 tablet (325 mg total) by mouth 2 (two) times daily with meals. , Disp: 180 tablet, Rfl: 1  Cholecalciferol (VITAMIN D3) 2000 units Oral Tab, Take 1 capsule by mouth daily. , Disp: , Rfl:   acetaminophen 325 MG Oral Tab, Take 325 mg by mouth every 4 (four) hours as needed for Pain., Disp: , Rfl:     No current facility-administered medications on file prior to visit. Allergies  No Known Allergies    Physical Exam  Constitutional: no acute distress  HEENT: PERRL  Neck: supple, no JVD  Cardio: RRR, S1 S2  Respiratory: clear to auscultation bilaterally, no wheezing, rales, rhonchi, crackles  GI: abdomen soft, non tender, active bowel sounds, no organomegaly  Extremities: no clubbing, cyanosis, edema  Neurologic: no gross motor deficits  Skin: warm, dry  Lymphatic: no supraclavicular lymphadenopathy     Assessment  1. Diastolic CHF  2. Atrial fibrillation  3. Prior nicotine dependence    Plan  -Patient presents today for follow-up visit after recent hospitalization November 2022 with evidence of acute hypoxemic respiratory failure secondary to acute on chronic diastolic CHF. Clinically improved after diuresis. Cannot rule out component of underlying COPD. Prior history of nicotine dependence smoked majority of her life until about 25 years ago. We will obtain baseline pulmonary function testing. Patient most recently on 1 L nasal cannula oxygen. Patient's recordings at home reveal oxygen saturations above 90%. With ambulation in the office today on room air no significant hypoxia noted. Hold for now monitor off oxygen at this time.     Darwin Sethi DO  Pulmonary Medicine  Jose Harris  12/1/2022  3:52 PM

## 2022-12-26 ENCOUNTER — TELEPHONE (OUTPATIENT)
Dept: CARDIOLOGY | Age: 82
End: 2022-12-26

## 2022-12-27 RX ORDER — POTASSIUM CHLORIDE 20 MEQ/1
20 TABLET, EXTENDED RELEASE ORAL 2 TIMES DAILY
COMMUNITY

## 2022-12-27 RX ORDER — TORSEMIDE 20 MG/1
20 TABLET ORAL 2 TIMES DAILY
COMMUNITY

## 2023-01-03 ENCOUNTER — TELEPHONE (OUTPATIENT)
Dept: CARDIOLOGY | Age: 83
End: 2023-01-03

## 2023-01-06 ENCOUNTER — APPOINTMENT (OUTPATIENT)
Dept: GENERAL RADIOLOGY | Facility: HOSPITAL | Age: 83
End: 2023-01-06
Attending: EMERGENCY MEDICINE
Payer: MEDICARE

## 2023-01-06 ENCOUNTER — HOSPITAL ENCOUNTER (INPATIENT)
Facility: HOSPITAL | Age: 83
LOS: 2 days | Discharge: HOME | End: 2023-01-08
Attending: EMERGENCY MEDICINE | Admitting: HOSPITALIST
Payer: MEDICARE

## 2023-01-06 DIAGNOSIS — I50.9 ACUTE ON CHRONIC CONGESTIVE HEART FAILURE, UNSPECIFIED HEART FAILURE TYPE (HCC): Primary | ICD-10-CM

## 2023-01-06 LAB
ANION GAP SERPL CALC-SCNC: 6 MMOL/L (ref 0–18)
BASOPHILS # BLD AUTO: 0.05 X10(3) UL (ref 0–0.2)
BASOPHILS NFR BLD AUTO: 0.6 %
BUN BLD-MCNC: 20 MG/DL (ref 7–18)
BUN/CREAT SERPL: 16.4 (ref 10–20)
CALCIUM BLD-MCNC: 8.8 MG/DL (ref 8.5–10.1)
CHLORIDE SERPL-SCNC: 111 MMOL/L (ref 98–112)
CO2 SERPL-SCNC: 22 MMOL/L (ref 21–32)
CREAT BLD-MCNC: 1.22 MG/DL
DEPRECATED RDW RBC AUTO: 64.6 FL (ref 35.1–46.3)
EOSINOPHIL # BLD AUTO: 0.24 X10(3) UL (ref 0–0.7)
EOSINOPHIL NFR BLD AUTO: 2.7 %
ERYTHROCYTE [DISTWIDTH] IN BLOOD BY AUTOMATED COUNT: 18.6 % (ref 11–15)
FLUAV + FLUBV RNA SPEC NAA+PROBE: NEGATIVE
FLUAV + FLUBV RNA SPEC NAA+PROBE: NEGATIVE
GFR SERPLBLD BASED ON 1.73 SQ M-ARVRAT: 44 ML/MIN/1.73M2 (ref 60–?)
GLUCOSE BLD-MCNC: 137 MG/DL (ref 70–99)
HCT VFR BLD AUTO: 32.9 %
HGB BLD-MCNC: 10.4 G/DL
IMM GRANULOCYTES # BLD AUTO: 0.02 X10(3) UL (ref 0–1)
IMM GRANULOCYTES NFR BLD: 0.2 %
LYMPHOCYTES # BLD AUTO: 0.78 X10(3) UL (ref 1–4)
LYMPHOCYTES NFR BLD AUTO: 8.8 %
MAGNESIUM SERPL-MCNC: 2.3 MG/DL (ref 1.6–2.6)
MCH RBC QN AUTO: 29.5 PG (ref 26–34)
MCHC RBC AUTO-ENTMCNC: 31.6 G/DL (ref 31–37)
MCV RBC AUTO: 93.5 FL
MONOCYTES # BLD AUTO: 0.51 X10(3) UL (ref 0.1–1)
MONOCYTES NFR BLD AUTO: 5.7 %
NEUTROPHILS # BLD AUTO: 7.3 X10 (3) UL (ref 1.5–7.7)
NEUTROPHILS # BLD AUTO: 7.3 X10(3) UL (ref 1.5–7.7)
NEUTROPHILS NFR BLD AUTO: 82 %
NT-PROBNP SERPL-MCNC: 4269 PG/ML (ref ?–450)
OSMOLALITY SERPL CALC.SUM OF ELEC: 293 MOSM/KG (ref 275–295)
PLATELET # BLD AUTO: 287 10(3)UL (ref 150–450)
POTASSIUM SERPL-SCNC: 4.5 MMOL/L (ref 3.5–5.1)
RBC # BLD AUTO: 3.52 X10(6)UL
RSV RNA SPEC NAA+PROBE: NEGATIVE
SARS-COV-2 RNA RESP QL NAA+PROBE: NOT DETECTED
SODIUM SERPL-SCNC: 139 MMOL/L (ref 136–145)
WBC # BLD AUTO: 8.9 X10(3) UL (ref 4–11)

## 2023-01-06 PROCEDURE — 71045 X-RAY EXAM CHEST 1 VIEW: CPT | Performed by: EMERGENCY MEDICINE

## 2023-01-06 PROCEDURE — 99223 1ST HOSP IP/OBS HIGH 75: CPT | Performed by: HOSPITALIST

## 2023-01-06 RX ORDER — FUROSEMIDE 10 MG/ML
40 INJECTION INTRAMUSCULAR; INTRAVENOUS
Status: DISCONTINUED | OUTPATIENT
Start: 2023-01-07 | End: 2023-01-08

## 2023-01-06 RX ORDER — HEPARIN SODIUM 5000 [USP'U]/ML
5000 INJECTION, SOLUTION INTRAVENOUS; SUBCUTANEOUS EVERY 12 HOURS SCHEDULED
Status: DISCONTINUED | OUTPATIENT
Start: 2023-01-06 | End: 2023-01-07

## 2023-01-06 RX ORDER — POLYETHYLENE GLYCOL 3350 17 G/17G
17 POWDER, FOR SOLUTION ORAL DAILY PRN
Status: DISCONTINUED | OUTPATIENT
Start: 2023-01-06 | End: 2023-01-08

## 2023-01-06 RX ORDER — ACETAMINOPHEN 500 MG
500 TABLET ORAL EVERY 4 HOURS PRN
Status: DISCONTINUED | OUTPATIENT
Start: 2023-01-06 | End: 2023-01-08

## 2023-01-06 RX ORDER — NITROGLYCERIN 0.4 MG/1
0.4 TABLET SUBLINGUAL EVERY 5 MIN PRN
Status: DISCONTINUED | OUTPATIENT
Start: 2023-01-06 | End: 2023-01-08

## 2023-01-06 RX ORDER — BISACODYL 10 MG
10 SUPPOSITORY, RECTAL RECTAL
Status: DISCONTINUED | OUTPATIENT
Start: 2023-01-06 | End: 2023-01-08

## 2023-01-06 RX ORDER — ONDANSETRON 2 MG/ML
4 INJECTION INTRAMUSCULAR; INTRAVENOUS EVERY 6 HOURS PRN
Status: DISCONTINUED | OUTPATIENT
Start: 2023-01-06 | End: 2023-01-08

## 2023-01-06 RX ORDER — FUROSEMIDE 10 MG/ML
40 INJECTION INTRAMUSCULAR; INTRAVENOUS ONCE
Status: COMPLETED | OUTPATIENT
Start: 2023-01-06 | End: 2023-01-06

## 2023-01-06 RX ORDER — SENNOSIDES 8.6 MG
17.2 TABLET ORAL NIGHTLY PRN
Status: DISCONTINUED | OUTPATIENT
Start: 2023-01-06 | End: 2023-01-08

## 2023-01-07 LAB
ANION GAP SERPL CALC-SCNC: 5 MMOL/L (ref 0–18)
BASOPHILS # BLD AUTO: 0.04 X10(3) UL (ref 0–0.2)
BASOPHILS NFR BLD AUTO: 0.6 %
BILIRUB UR QL: NEGATIVE
BUN BLD-MCNC: 18 MG/DL (ref 7–18)
BUN/CREAT SERPL: 15.7 (ref 10–20)
CALCIUM BLD-MCNC: 8.7 MG/DL (ref 8.5–10.1)
CHLORIDE SERPL-SCNC: 107 MMOL/L (ref 98–112)
CHOLEST SERPL-MCNC: 99 MG/DL (ref ?–200)
CLARITY UR: CLEAR
CO2 SERPL-SCNC: 26 MMOL/L (ref 21–32)
COLOR UR: YELLOW
CREAT BLD-MCNC: 1.15 MG/DL
DEPRECATED RDW RBC AUTO: 62.9 FL (ref 35.1–46.3)
EOSINOPHIL # BLD AUTO: 0.05 X10(3) UL (ref 0–0.7)
EOSINOPHIL NFR BLD AUTO: 0.7 %
ERYTHROCYTE [DISTWIDTH] IN BLOOD BY AUTOMATED COUNT: 18.4 % (ref 11–15)
GFR SERPLBLD BASED ON 1.73 SQ M-ARVRAT: 48 ML/MIN/1.73M2 (ref 60–?)
GLUCOSE BLD-MCNC: 119 MG/DL (ref 70–99)
GLUCOSE BLDC GLUCOMTR-MCNC: 100 MG/DL (ref 70–99)
GLUCOSE BLDC GLUCOMTR-MCNC: 105 MG/DL (ref 70–99)
GLUCOSE BLDC GLUCOMTR-MCNC: 166 MG/DL (ref 70–99)
GLUCOSE BLDC GLUCOMTR-MCNC: 97 MG/DL (ref 70–99)
GLUCOSE UR-MCNC: NEGATIVE MG/DL
HCT VFR BLD AUTO: 29.8 %
HDLC SERPL-MCNC: 42 MG/DL (ref 40–59)
HGB BLD-MCNC: 9.5 G/DL
HYALINE CASTS #/AREA URNS AUTO: PRESENT /LPF
HYALINE CASTS #/AREA URNS AUTO: PRESENT /LPF
IMM GRANULOCYTES # BLD AUTO: 0.02 X10(3) UL (ref 0–1)
IMM GRANULOCYTES NFR BLD: 0.3 %
KETONES UR-MCNC: NEGATIVE MG/DL
LDLC SERPL CALC-MCNC: 37 MG/DL (ref ?–100)
LEUKOCYTE ESTERASE UR QL STRIP.AUTO: NEGATIVE
LYMPHOCYTES # BLD AUTO: 0.68 X10(3) UL (ref 1–4)
LYMPHOCYTES NFR BLD AUTO: 9.7 %
MAGNESIUM SERPL-MCNC: 2.2 MG/DL (ref 1.6–2.6)
MCH RBC QN AUTO: 29.7 PG (ref 26–34)
MCHC RBC AUTO-ENTMCNC: 31.9 G/DL (ref 31–37)
MCV RBC AUTO: 93.1 FL
MONOCYTES # BLD AUTO: 0.44 X10(3) UL (ref 0.1–1)
MONOCYTES NFR BLD AUTO: 6.3 %
NEUTROPHILS # BLD AUTO: 5.8 X10 (3) UL (ref 1.5–7.7)
NEUTROPHILS # BLD AUTO: 5.8 X10(3) UL (ref 1.5–7.7)
NEUTROPHILS NFR BLD AUTO: 82.4 %
NITRITE UR QL STRIP.AUTO: NEGATIVE
NONHDLC SERPL-MCNC: 57 MG/DL (ref ?–130)
OSMOLALITY SERPL CALC.SUM OF ELEC: 289 MOSM/KG (ref 275–295)
PH UR: 6 [PH] (ref 5–8)
PLATELET # BLD AUTO: 247 10(3)UL (ref 150–450)
POTASSIUM SERPL-SCNC: 3.6 MMOL/L (ref 3.5–5.1)
POTASSIUM SERPL-SCNC: 4.1 MMOL/L (ref 3.5–5.1)
PROT UR-MCNC: NEGATIVE MG/DL
Q-T INTERVAL: 338 MS
QRS DURATION: 70 MS
QTC CALCULATION (BEZET): 402 MS
R AXIS: 91 DEGREES
RBC # BLD AUTO: 3.2 X10(6)UL
SODIUM SERPL-SCNC: 138 MMOL/L (ref 136–145)
SP GR UR STRIP: 1.01 (ref 1–1.03)
T AXIS: 146 DEGREES
TRIGL SERPL-MCNC: 108 MG/DL (ref 30–149)
UROBILINOGEN UR STRIP-ACNC: 0.2
VENTRICULAR RATE: 85 BPM
VLDLC SERPL CALC-MCNC: 15 MG/DL (ref 0–30)
WBC # BLD AUTO: 7 X10(3) UL (ref 4–11)

## 2023-01-07 PROCEDURE — 99232 SBSQ HOSP IP/OBS MODERATE 35: CPT | Performed by: HOSPITALIST

## 2023-01-07 RX ORDER — DILTIAZEM HYDROCHLORIDE 120 MG/1
120 CAPSULE, EXTENDED RELEASE ORAL DAILY
Status: DISCONTINUED | OUTPATIENT
Start: 2023-01-07 | End: 2023-01-08

## 2023-01-07 RX ORDER — ATORVASTATIN CALCIUM 20 MG/1
20 TABLET, FILM COATED ORAL NIGHTLY
Status: DISCONTINUED | OUTPATIENT
Start: 2023-01-07 | End: 2023-01-08

## 2023-01-07 RX ORDER — METOPROLOL SUCCINATE 50 MG/1
50 TABLET, EXTENDED RELEASE ORAL DAILY
Status: DISCONTINUED | OUTPATIENT
Start: 2023-01-07 | End: 2023-01-08

## 2023-01-07 RX ORDER — ASPIRIN 81 MG/1
81 TABLET, CHEWABLE ORAL DAILY
Status: DISCONTINUED | OUTPATIENT
Start: 2023-01-07 | End: 2023-01-08

## 2023-01-07 RX ORDER — POTASSIUM CHLORIDE 20 MEQ/1
40 TABLET, EXTENDED RELEASE ORAL EVERY 4 HOURS
Status: COMPLETED | OUTPATIENT
Start: 2023-01-07 | End: 2023-01-07

## 2023-01-07 NOTE — PLAN OF CARE
Problem: Patient Centered Care  Goal: Patient preferences are identified and integrated in the patient's plan of care  Description: Interventions:  - What would you like us to know as we care for you?   - Provide timely, complete, and accurate information to patient/family  - Incorporate patient and family knowledge, values, beliefs, and cultural backgrounds into the planning and delivery of care  - Encourage patient/family to participate in care and decision-making at the level they choose  - Honor patient and family perspectives and choices  Outcome: Progressing     Problem: Patient/Family Goals  Goal: Patient/Family Long Term Goal  Description: Patient's Long Term Goal: Discharge home    Interventions:  - Medications  -Vitals  -Labs  - See additional Care Plan goals for specific interventions  Outcome: Progressing  Goal: Patient/Family Short Term Goal  Description: Patient's Short Term Goal: Improve shortness of breath    Interventions:   - IV lasix  - See additional Care Plan goals for specific interventions  Outcome: Progressing     Problem: CARDIOVASCULAR - ADULT  Goal: Maintains optimal cardiac output and hemodynamic stability  Description: INTERVENTIONS:  - Monitor vital signs, rhythm, and trends  - Monitor for bleeding, hypotension and signs of decreased cardiac output  - Evaluate effectiveness of vasoactive medications to optimize hemodynamic stability  - Monitor arterial and/or venous puncture sites for bleeding and/or hematoma  - Assess quality of pulses, skin color and temperature  - Assess for signs of decreased coronary artery perfusion - ex.  Angina  - Evaluate fluid balance, assess for edema, trend weights  Outcome: Progressing  Goal: Absence of cardiac arrhythmias or at baseline  Description: INTERVENTIONS:  - Continuous cardiac monitoring, monitor vital signs, obtain 12 lead EKG if indicated  - Evaluate effectiveness of antiarrhythmic and heart rate control medications as ordered  - Initiate emergency measures for life threatening arrhythmias  - Monitor electrolytes and administer replacement therapy as ordered  Outcome: Progressing     Problem: SAFETY ADULT - FALL  Goal: Free from fall injury  Description: INTERVENTIONS:  - Assess pt frequently for physical needs  - Identify cognitive and physical deficits and behaviors that affect risk of falls. - [place] fall precautions as indicated by assessment.  - Educate pt/family on patient safety including physical limitations  - Instruct pt to call for assistance with activity based on assessment  - Modify environment to reduce risk of injury  - Provide assistive devices as appropriate  - Consider OT/PT consult to assist with strengthening/mobility  - Encourage toileting schedule  Outcome: Progressing     Problem: RESPIRATORY - ADULT  Goal: Achieves optimal ventilation and oxygenation  Description: INTERVENTIONS:  - Assess for changes in respiratory status  - Assess for changes in mentation and behavior  - Position to facilitate oxygenation and minimize respiratory effort  - Oxygen supplementation based on oxygen saturation or ABGs  - Provide Smoking Cessation handout, if applicable  - Encourage broncho-pulmonary hygiene including cough, deep breathe, Incentive Spirometry  - Assess the need for suctioning and perform as needed  - Assess and instruct to report SOB or any respiratory difficulty  - Respiratory Therapy support as indicated  - Manage/alleviate anxiety  - Monitor for signs/symptoms of CO2 retention  Outcome: Progressing     Patient is alert and orientated x 4. Patient is on RA. Patient is on IV lasix BID. Strict I&O. Plan to discharge home tomorrow.

## 2023-01-07 NOTE — PLAN OF CARE
Received Pt from the ED, Aox4, 2L o2, VSS, denies pain. Med Rec completed, provider notified. Safety precautions in place, call light within reach. Problem: Patient Centered Care  Goal: Patient preferences are identified and integrated in the patient's plan of care  Description: Interventions:  - What would you like us to know as we care for you? Personal preferences from home   - Provide timely, complete, and accurate information to patient/family  - Incorporate patient and family knowledge, values, beliefs, and cultural backgrounds into the planning and delivery of care  - Encourage patient/family to participate in care and decision-making at the level they choose  - Honor patient and family perspectives and choices  Outcome: Progressing     Problem: Patient/Family Goals  Goal: Patient/Family Long Term Goal  Description: Patient's Long Term Goal: Going home    Interventions:  -   - See additional Care Plan goals for specific interventions  Outcome: Progressing  Goal: Patient/Family Short Term Goal  Description: Patient's Short Term Goal:     Interventions:   -   - See additional Care Plan goals for specific interventions  Outcome: Progressing     Problem: CARDIOVASCULAR - ADULT  Goal: Maintains optimal cardiac output and hemodynamic stability  Description: INTERVENTIONS:  - Monitor vital signs, rhythm, and trends  - Monitor for bleeding, hypotension and signs of decreased cardiac output  - Evaluate effectiveness of vasoactive medications to optimize hemodynamic stability  - Monitor arterial and/or venous puncture sites for bleeding and/or hematoma  - Assess quality of pulses, skin color and temperature  - Assess for signs of decreased coronary artery perfusion - ex.  Angina  - Evaluate fluid balance, assess for edema, trend weights  Outcome: Progressing  Goal: Absence of cardiac arrhythmias or at baseline  Description: INTERVENTIONS:  - Continuous cardiac monitoring, monitor vital signs, obtain 12 lead EKG if indicated  - Evaluate effectiveness of antiarrhythmic and heart rate control medications as ordered  - Initiate emergency measures for life threatening arrhythmias  - Monitor electrolytes and administer replacement therapy as ordered  Outcome: Progressing     Problem: SAFETY ADULT - FALL  Goal: Free from fall injury  Description: INTERVENTIONS:  - Assess pt frequently for physical needs  - Identify cognitive and physical deficits and behaviors that affect risk of falls.   - [place] fall precautions as indicated by assessment.  - Educate pt/family on patient safety including physical limitations  - Instruct pt to call for assistance with activity based on assessment  - Modify environment to reduce risk of injury  - Provide assistive devices as appropriate  - Consider OT/PT consult to assist with strengthening/mobility  - Encourage toileting schedule  Outcome: Progressing     Problem: RESPIRATORY - ADULT  Goal: Achieves optimal ventilation and oxygenation  Description: INTERVENTIONS:  - Assess for changes in respiratory status  - Assess for changes in mentation and behavior  - Position to facilitate oxygenation and minimize respiratory effort  - Oxygen supplementation based on oxygen saturation or ABGs  - Provide Smoking Cessation handout, if applicable  - Encourage broncho-pulmonary hygiene including cough, deep breathe, Incentive Spirometry  - Assess the need for suctioning and perform as needed  - Assess and instruct to report SOB or any respiratory difficulty  - Respiratory Therapy support as indicated  - Manage/alleviate anxiety  - Monitor for signs/symptoms of CO2 retention  Outcome: Progressing

## 2023-01-07 NOTE — ED QUICK NOTES
Orders for admission, patient is aware of plan and ready to go upstairs. Any questions, please call ED RN Willard Bueno  at extension [phone].      Patient Covid vaccination status: Unvaccinated     COVID Test Ordered in ED: SARS-CoV-2/Flu A and B/RSV by PCR (GeneXpert)-negative     COVID Suspicion at Admission: N/A    Running Infusions:  None    Mental Status/LOC at time of transport: A&ox4     Other pertinent information: On 2L of oxygen via [place]  CIWA score: N/A   NIH score:  N/A

## 2023-01-08 VITALS
TEMPERATURE: 98 F | RESPIRATION RATE: 18 BRPM | BODY MASS INDEX: 25.74 KG/M2 | HEART RATE: 100 BPM | DIASTOLIC BLOOD PRESSURE: 74 MMHG | OXYGEN SATURATION: 92 % | HEIGHT: 62 IN | WEIGHT: 139.88 LBS | SYSTOLIC BLOOD PRESSURE: 110 MMHG

## 2023-01-08 LAB
ANION GAP SERPL CALC-SCNC: 4 MMOL/L (ref 0–18)
BASOPHILS # BLD AUTO: 0.05 X10(3) UL (ref 0–0.2)
BASOPHILS NFR BLD AUTO: 0.9 %
BUN BLD-MCNC: 19 MG/DL (ref 7–18)
BUN/CREAT SERPL: 15.8 (ref 10–20)
CALCIUM BLD-MCNC: 9.2 MG/DL (ref 8.5–10.1)
CHLORIDE SERPL-SCNC: 107 MMOL/L (ref 98–112)
CO2 SERPL-SCNC: 27 MMOL/L (ref 21–32)
CREAT BLD-MCNC: 1.2 MG/DL
DEPRECATED RDW RBC AUTO: 63.7 FL (ref 35.1–46.3)
EOSINOPHIL # BLD AUTO: 0.48 X10(3) UL (ref 0–0.7)
EOSINOPHIL NFR BLD AUTO: 8.6 %
ERYTHROCYTE [DISTWIDTH] IN BLOOD BY AUTOMATED COUNT: 18.5 % (ref 11–15)
GFR SERPLBLD BASED ON 1.73 SQ M-ARVRAT: 45 ML/MIN/1.73M2 (ref 60–?)
GLUCOSE BLD-MCNC: 91 MG/DL (ref 70–99)
GLUCOSE BLDC GLUCOMTR-MCNC: 106 MG/DL (ref 70–99)
GLUCOSE BLDC GLUCOMTR-MCNC: 109 MG/DL (ref 70–99)
HCT VFR BLD AUTO: 31.7 %
HGB BLD-MCNC: 9.8 G/DL
IMM GRANULOCYTES # BLD AUTO: 0.01 X10(3) UL (ref 0–1)
IMM GRANULOCYTES NFR BLD: 0.2 %
LYMPHOCYTES # BLD AUTO: 1.07 X10(3) UL (ref 1–4)
LYMPHOCYTES NFR BLD AUTO: 19.2 %
MAGNESIUM SERPL-MCNC: 2.3 MG/DL (ref 1.6–2.6)
MCH RBC QN AUTO: 29.3 PG (ref 26–34)
MCHC RBC AUTO-ENTMCNC: 30.9 G/DL (ref 31–37)
MCV RBC AUTO: 94.6 FL
MONOCYTES # BLD AUTO: 0.71 X10(3) UL (ref 0.1–1)
MONOCYTES NFR BLD AUTO: 12.8 %
NEUTROPHILS # BLD AUTO: 3.24 X10 (3) UL (ref 1.5–7.7)
NEUTROPHILS # BLD AUTO: 3.24 X10(3) UL (ref 1.5–7.7)
NEUTROPHILS NFR BLD AUTO: 58.3 %
OSMOLALITY SERPL CALC.SUM OF ELEC: 288 MOSM/KG (ref 275–295)
PHOSPHATE SERPL-MCNC: 3 MG/DL (ref 2.5–4.9)
PLATELET # BLD AUTO: 259 10(3)UL (ref 150–450)
POTASSIUM SERPL-SCNC: 4.1 MMOL/L (ref 3.5–5.1)
RBC # BLD AUTO: 3.35 X10(6)UL
SODIUM SERPL-SCNC: 138 MMOL/L (ref 136–145)
WBC # BLD AUTO: 5.6 X10(3) UL (ref 4–11)

## 2023-01-08 PROCEDURE — 99239 HOSP IP/OBS DSCHRG MGMT >30: CPT | Performed by: HOSPITALIST

## 2023-01-08 RX ORDER — TORSEMIDE 20 MG/1
20 TABLET ORAL 2 TIMES DAILY
Qty: 60 TABLET | Refills: 1 | Status: SHIPPED | OUTPATIENT
Start: 2023-01-08

## 2023-01-08 RX ORDER — DILTIAZEM HYDROCHLORIDE 120 MG/1
120 CAPSULE, COATED, EXTENDED RELEASE ORAL DAILY
Qty: 30 CAPSULE | Refills: 0 | Status: SHIPPED | OUTPATIENT
Start: 2023-01-08 | End: 2023-02-07

## 2023-01-08 RX ORDER — POTASSIUM CHLORIDE 1.5 G/1.77G
20 POWDER, FOR SOLUTION ORAL DAILY
Qty: 30 EACH | Refills: 0 | Status: SHIPPED | OUTPATIENT
Start: 2023-01-08

## 2023-01-08 NOTE — PLAN OF CARE
Problem: Patient Centered Care  Goal: Patient preferences are identified and integrated in the patient's plan of care  Description: Interventions:  - What would you like us to know as we care for you? I can't wait to go home. - Provide timely, complete, and accurate information to patient/family  - Incorporate patient and family knowledge, values, beliefs, and cultural backgrounds into the planning and delivery of care  - Encourage patient/family to participate in care and decision-making at the level they choose  - Honor patient and family perspectives and choices  Outcome: Progressing     Problem: Patient/Family Goals  Goal: Patient/Family Long Term Goal  Description: Patient's Long Term Goal: Return to home    Interventions:  - - See additional Care Plan goals for specific interventions  Outcome: Progressing  Goal: Patient/Family Short Term Goal  Description: Patient's Short Term Goal: Get some sleep    Interventions:   - Assist with repositioning for comfort. Assess for pain. - See additional Care Plan goals for specific interventions  Outcome: Progressing     Problem: CARDIOVASCULAR - ADULT  Goal: Maintains optimal cardiac output and hemodynamic stability  Description: INTERVENTIONS:  - Monitor vital signs, rhythm, and trends  - Monitor for bleeding, hypotension and signs of decreased cardiac output  - Evaluate effectiveness of vasoactive medications to optimize hemodynamic stability  - Monitor arterial and/or venous puncture sites for bleeding and/or hematoma  - Assess quality of pulses, skin color and temperature  - Assess for signs of decreased coronary artery perfusion - ex.  Angina  - Evaluate fluid balance, assess for edema, trend weights  Outcome: Progressing  Note: Remains on remote tele-  Goal: Absence of cardiac arrhythmias or at baseline  Description: INTERVENTIONS:  - Continuous cardiac monitoring, monitor vital signs, obtain 12 lead EKG if indicated  - Evaluate effectiveness of antiarrhythmic and heart rate control medications as ordered  - Initiate emergency measures for life threatening arrhythmias  - Monitor electrolytes and administer replacement therapy as ordered  Outcome: Progressing     Problem: SAFETY ADULT - FALL  Goal: Free from fall injury  Description: INTERVENTIONS:  - Assess pt frequently for physical needs  - Identify cognitive and physical deficits and behaviors that affect risk of falls. - [place] fall precautions as indicated by assessment.  - Educate pt/family on patient safety including physical limitations  - Instruct pt to call for assistance with activity based on assessment  - Modify environment to reduce risk of injury  - Provide assistive devices as appropriate  - Consider OT/PT consult to assist with strengthening/mobility  - Encourage toileting schedule  Outcome: Progressing  Note: Reviewed safety plan with Neelima Glover Bed alarm & ibed awareness active. Problem: RESPIRATORY - ADULT  Goal: Achieves optimal ventilation and oxygenation  Description: INTERVENTIONS:  - Assess for changes in respiratory status  - Assess for changes in mentation and behavior  - Position to facilitate oxygenation and minimize respiratory effort  - Oxygen supplementation based on oxygen saturation or ABGs  - Provide Smoking Cessation handout, if applicable  - Encourage broncho-pulmonary hygiene including cough, deep breathe, Incentive Spirometry  - Assess the need for suctioning and perform as needed  - Assess and instruct to report SOB or any respiratory difficulty  - Respiratory Therapy support as indicated  - Manage/alleviate anxiety  - Monitor for signs/symptoms of CO2 retention  Outcome: Progressing  Note: Denies shortness of breath or chest pain. Remains on room air. O2 sat WNL. Lung sounds bilateral diminished.       Problem: DISCHARGE PLANNING  Goal: Discharge to home or other facility with appropriate resources  Description: INTERVENTIONS:  - Identify barriers to discharge w/pt and caregiver  - Include patient/family/discharge partner in discharge planning  - Arrange for needed discharge resources and transportation as appropriate  - Identify discharge learning needs (meds, wound care, etc)  - Arrange for interpreters to assist at discharge as needed  - Consider post-discharge preferences of patient/family/discharge partner  - Complete POLST form as appropriate  - Assess patient's ability to be responsible for managing their own health  - Refer to Case Management Department for coordinating discharge planning if the patient needs post-hospital services based on physician/LIP order or complex needs related to functional status, cognitive ability or social support system  Outcome: Progressing  Note: DC plan to home once medically cleared,

## 2023-01-08 NOTE — DISCHARGE INSTRUCTIONS
Ok to go home  Low na diet  Fu cards as they wish  Labs as outpt  Warm pack while awake to neck     Medication List        CHANGE how you take these medications      dilTIAZem  MG Cp24  Commonly known as: dilTIAZem CD  Take 1 capsule (120 mg total) by mouth daily. What changed:   when to take this  Another medication with the same name was removed. Continue taking this medication, and follow the directions you see here. potassium chloride 20 MEQ Pack  Commonly known as: Klor-Con  Take 20 mEq by mouth daily. What changed: when to take this            CONTINUE taking these medications      acetaminophen 325 MG Tabs  Commonly known as: Tylenol     apixaban 2.5 MG Tabs  Commonly known as: Eliquis  Take 1 tablet (2.5 mg total) by mouth 2 (two) times daily. aspirin 81 MG Chew  Chew 1 tablet (81 mg total) by mouth in the morning. atorvastatin 20 MG Tabs  Commonly known as: Lipitor  Take 1 tablet (20 mg total) by mouth nightly. cholecalciferol 50 MCG (2000 UT) Tabs     ferrous sulfate 325 (65 FE) MG Tbec  Take 1 tablet (325 mg total) by mouth 2 (two) times daily with meals. levothyroxine 50 MCG Tabs  Commonly known as: Synthroid  Take 1 tablet (50 mcg total) by mouth before breakfast.     magnesium oxide 400 MG Tabs  Commonly known as: Mag-Ox  Take 1 tablet (400 mg total) by mouth 2 (two) times daily. metoprolol succinate ER 50 MG Tb24  Commonly known as: Toprol XL     omeprazole 20 MG Cpdr  Commonly known as: PriLOSEC  Take 1 capsule (20 mg total) by mouth daily. torsemide 20 MG Tabs  Commonly known as: Demadex  Take 1 tablet (20 mg total) by mouth 2 (two) times daily.             STOP taking these medications      Klor-Con M20 20 MEQ Tbcr  Generic drug: potassium chloride               Where to Get Your Medications        You can get these medications from any pharmacy    Bring a paper prescription for each of these medications  dilTIAZem  MG Cp24  potassium chloride 20 MEQ Pack  torsemide 20 MG Tabs

## 2023-01-08 NOTE — DISCHARGE SUMMARY
Dc summary#[id number]  >30 min spent on [address] [facility] [address] Discharge Diagnoses: chf    Lace+ Score: 73  59-90 High Risk  29-58 Medium Risk  0-28   Low Risk. TCM Follow-Up Recommendation:  LACE > 58:  High Risk of readmission after discharge from the hospital.tcm fu recommended

## 2023-01-09 NOTE — DISCHARGE SUMMARY
[facility]    PATIENT'S NAME: Tonia DELATORRE   ATTENDING PHYSICIAN: Candelario Orozco MD   PATIENT ACCOUNT#:   [id number]    LOCATION:  [address] [place] [address] #:   [id number]       YOB: 1940  ADMISSION DATE:       01/06/2023      DISCHARGE DATE:  01/08/2023    DISCHARGE SUMMARY      More than 45 minutes were spent preparing this discharge. DISCHARGE DIAGNOSIS:  Acute on chronic diastolic congestive heart failure, presenting with dyspnea perhaps related to not taking her medications. HISTORY AND HOSPITAL COURSE:  This is a very pleasant 80-year-old white female who appears younger than her stated age who presents with a history of having progressive shortness of breath. She had not taken her medicines for several days because she ran out. She was unable to get them from her mail order. She had a wet cough and was really quite uncomfortable. On admission to the emergency room, she had an O2 saturation of 87% on room air. She was placed on oxygen and received diuresis and was much improved, was able to be discharged home after approval by Cardiology. PHYSICAL EXAMINATION ON DISCHARGE:    VITAL SIGNS:  Temperature 98, pulse 100, respiratory rate 18, 110/74, 92% on room air. HEENT:  Oropharynx is now dull and dry. HEART:  Normal S1, S2. No S3. Tachycardic. ABDOMEN:  Soft and nontender. EXTREMITIES:  Mild edema. NEUROLOGIC:  She is alert and oriented, friendly and cooperative. LABORATORY STUDIES:  Please see chart. ASSESSMENT AND PLAN:    1. Acute on chronic diastolic congestive heart failure. P.o. torsemide as per Cardiology. 2.   Acute on chronic hypoxemic respiratory failure, O2 saturation 87% on room air. Weaned off oxygen. Doing well. 3.   History of atrial fibrillation. Continue medications. 4.   Dysuria. Patient's urinalysis does not seem to be revealing. 5.   Poor appetite, perhaps from gas and CHF. 6.   Hyperlipidemia.   7.   DVT prophylaxis, on Eliquis. 8.   Muscle spasm in the right side of her neck. CONDITION ON DISCHARGE:  Stable. CODE STATUS:  Full Code. DISCHARGE MEDICATIONS:  All new medications were written on paper prescriptions per patient's and daughter's request.    1.   Tylenol 325 mg every 4 hours as needed. Watch total daily Tylenol limit to 3 g.  2.   Eliquis 2.5 mg twice a day. Watch for bleeding. 3.   Ecotrin 81 mg daily with food. 4.   Atorvastatin 20 mg nightly. Watch for muscle weakness. 5.   Vitamin D3 at 2000 units daily. 6.   Cardizem  mg daily. 7.   Ferrous sulfate 325 mg twice a day. 8.   Synthroid 50 mcg daily. 9.   Magnesium oxide 400 mg twice a day. 10.   Metoprolol 50 mg by mouth every morning. 11.   Omeprazole 20 mg daily. 12.   Potassium chloride 20 mEq daily. 13.   Torsemide 20 mg twice a day. DISCHARGE INSTRUCTIONS:  Diet is low fat, low salt. Activity is no heavy exercise; otherwise, as tolerated. Followup is with Dr. Jayden Acosta in 1 week as per Cardiology. Dr. Dae Raymond; please call in 3 days for followup advice. Followup Cardiology as they wish with labs as an outpatient. Warm pack to her neck while awake. RISK OF READMISSION:  High. TCM followup recommended. Dictated By Kia Yin.  MD Pam  d: 01/08/2023 18:05:16  t: 01/08/2023 18:54:52  Job [id number]  [facility]/

## 2023-01-13 ENCOUNTER — LAB SERVICES (OUTPATIENT)
Dept: LAB | Age: 83
End: 2023-01-13

## 2023-01-13 ENCOUNTER — LAB REQUISITION (OUTPATIENT)
Dept: LAB | Age: 83
End: 2023-01-13

## 2023-01-13 DIAGNOSIS — I10 ESSENTIAL (PRIMARY) HYPERTENSION: ICD-10-CM

## 2023-01-13 DIAGNOSIS — E55.9 VITAMIN D DEFICIENCY, UNSPECIFIED: ICD-10-CM

## 2023-01-13 DIAGNOSIS — D50.8 OTHER IRON DEFICIENCY ANEMIAS: ICD-10-CM

## 2023-01-13 DIAGNOSIS — I50.32 CHRONIC DIASTOLIC (CONGESTIVE) HEART FAILURE (CMD): ICD-10-CM

## 2023-01-13 DIAGNOSIS — R53.1 WEAKNESS: ICD-10-CM

## 2023-01-13 LAB
25(OH)D3+25(OH)D2 SERPL-MCNC: 39.5 NG/ML (ref 30–100)
ALBUMIN SERPL-MCNC: 3.9 G/DL (ref 3.6–5.1)
ALBUMIN/GLOB SERPL: 0.9 {RATIO} (ref 1–2.4)
ALP SERPL-CCNC: 138 UNITS/L (ref 45–117)
ALT SERPL-CCNC: 16 UNITS/L
ANION GAP SERPL CALC-SCNC: 13 MMOL/L (ref 7–19)
AST SERPL-CCNC: 19 UNITS/L
BASOPHILS # BLD: 0.1 K/MCL (ref 0–0.3)
BASOPHILS NFR BLD: 1 %
BILIRUB SERPL-MCNC: 1.4 MG/DL (ref 0.2–1)
BUN SERPL-MCNC: 28 MG/DL (ref 6–20)
BUN/CREAT SERPL: 19 (ref 7–25)
CALCIUM SERPL-MCNC: 9.7 MG/DL (ref 8.4–10.2)
CHLORIDE SERPL-SCNC: 103 MMOL/L (ref 97–110)
CHOLEST SERPL-MCNC: 135 MG/DL
CHOLEST/HDLC SERPL: 3.6 {RATIO}
CO2 SERPL-SCNC: 28 MMOL/L (ref 21–32)
CREAT SERPL-MCNC: 1.48 MG/DL (ref 0.51–0.95)
DEPRECATED RDW RBC: 61.1 FL (ref 39–50)
EOSINOPHIL # BLD: 0.3 K/MCL (ref 0–0.5)
EOSINOPHIL NFR BLD: 3 %
ERYTHROCYTE [DISTWIDTH] IN BLOOD: 17.5 % (ref 11–15)
FASTING DURATION TIME PATIENT: ABNORMAL H
FASTING DURATION TIME PATIENT: ABNORMAL H
GFR SERPLBLD BASED ON 1.73 SQ M-ARVRAT: 35 ML/MIN
GLOBULIN SER-MCNC: 4.5 G/DL (ref 2–4)
GLUCOSE SERPL-MCNC: 121 MG/DL (ref 70–99)
HCT VFR BLD CALC: 34.8 % (ref 36–46.5)
HDLC SERPL-MCNC: 38 MG/DL
HGB BLD-MCNC: 11 G/DL (ref 12–15.5)
IMM GRANULOCYTES # BLD AUTO: 0 K/MCL (ref 0–0.2)
IMM GRANULOCYTES # BLD: 0 %
IRON SATN MFR SERPL: 9 % (ref 15–45)
IRON SERPL-MCNC: 24 MCG/DL (ref 50–170)
LDLC SERPL CALC-MCNC: 57 MG/DL
LYMPHOCYTES # BLD: 1 K/MCL (ref 1–4)
LYMPHOCYTES NFR BLD: 10 %
MAGNESIUM SERPL-MCNC: 2.8 MG/DL (ref 1.7–2.4)
MCH RBC QN AUTO: 29.7 PG (ref 26–34)
MCHC RBC AUTO-ENTMCNC: 31.6 G/DL (ref 32–36.5)
MCV RBC AUTO: 94.1 FL (ref 78–100)
MONOCYTES # BLD: 0.7 K/MCL (ref 0.3–0.9)
MONOCYTES NFR BLD: 7 %
NEUTROPHILS # BLD: 7.9 K/MCL (ref 1.8–7.7)
NEUTROPHILS NFR BLD: 79 %
NONHDLC SERPL-MCNC: 97 MG/DL
NRBC BLD MANUAL-RTO: 0 /100 WBC
PLATELET # BLD AUTO: 356 K/MCL (ref 140–450)
POTASSIUM SERPL-SCNC: 4.8 MMOL/L (ref 3.4–5.1)
PROT SERPL-MCNC: 8.4 G/DL (ref 6.4–8.2)
RBC # BLD: 3.7 MIL/MCL (ref 4–5.2)
SODIUM SERPL-SCNC: 139 MMOL/L (ref 135–145)
TIBC SERPL-MCNC: 254 MCG/DL (ref 250–450)
TRIGL SERPL-MCNC: 198 MG/DL
TSH SERPL-ACNC: 4.44 MCUNITS/ML (ref 0.35–5)
WBC # BLD: 10 K/MCL (ref 4.2–11)

## 2023-01-13 PROCEDURE — 80053 COMPREHEN METABOLIC PANEL: CPT | Performed by: CLINICAL MEDICAL LABORATORY

## 2023-01-13 PROCEDURE — 36415 COLL VENOUS BLD VENIPUNCTURE: CPT | Performed by: CLINICAL MEDICAL LABORATORY

## 2023-01-13 PROCEDURE — 83550 IRON BINDING TEST: CPT | Performed by: CLINICAL MEDICAL LABORATORY

## 2023-01-13 PROCEDURE — 85025 COMPLETE CBC W/AUTO DIFF WBC: CPT | Performed by: CLINICAL MEDICAL LABORATORY

## 2023-01-13 PROCEDURE — 83735 ASSAY OF MAGNESIUM: CPT | Performed by: CLINICAL MEDICAL LABORATORY

## 2023-01-13 PROCEDURE — 80061 LIPID PANEL: CPT | Performed by: CLINICAL MEDICAL LABORATORY

## 2023-01-13 PROCEDURE — 83540 ASSAY OF IRON: CPT | Performed by: CLINICAL MEDICAL LABORATORY

## 2023-01-13 PROCEDURE — 82306 VITAMIN D 25 HYDROXY: CPT | Performed by: CLINICAL MEDICAL LABORATORY

## 2023-01-13 PROCEDURE — 84443 ASSAY THYROID STIM HORMONE: CPT | Performed by: CLINICAL MEDICAL LABORATORY

## 2023-02-14 ENCOUNTER — CASE MANAGEMENT (OUTPATIENT)
Dept: CARE COORDINATION | Age: 83
End: 2023-02-14

## 2023-02-16 ENCOUNTER — CASE MANAGEMENT (OUTPATIENT)
Dept: CARE COORDINATION | Age: 83
End: 2023-02-16

## 2023-02-18 ENCOUNTER — CASE MANAGEMENT (OUTPATIENT)
Dept: CARE COORDINATION | Age: 83
End: 2023-02-18

## 2023-03-02 ENCOUNTER — CASE MANAGEMENT (OUTPATIENT)
Dept: CARE COORDINATION | Age: 83
End: 2023-03-02

## 2023-03-03 ENCOUNTER — TELEPHONE (OUTPATIENT)
Dept: PULMONOLOGY | Facility: CLINIC | Age: 83
End: 2023-03-03

## 2023-03-03 DIAGNOSIS — R06.00 DYSPNEA, UNSPECIFIED TYPE: Primary | ICD-10-CM

## 2023-03-03 NOTE — TELEPHONE ENCOUNTER
Asking for order to discontinue - return the oxygen tanks to [facility] - please contact them to  tanks from pt

## 2023-03-06 NOTE — TELEPHONE ENCOUNTER
Dr. Camara Generous daughter requesting order to DC home oxygen. States patient has been weaned her off 02 since January-saturations remain in the mid 80's. Order pended below if agreeable.

## 2023-03-07 NOTE — TELEPHONE ENCOUNTER
Oxygen discontinuation order faxed to [facility] at [phone]. Fax confirmation received.

## 2023-05-10 ENCOUNTER — APPOINTMENT (OUTPATIENT)
Dept: CARDIOLOGY | Age: 83
End: 2023-05-10

## 2023-07-17 ENCOUNTER — LAB REQUISITION (OUTPATIENT)
Dept: LAB | Age: 83
End: 2023-07-17

## 2023-07-17 DIAGNOSIS — I10 ESSENTIAL (PRIMARY) HYPERTENSION: ICD-10-CM

## 2023-07-17 DIAGNOSIS — R53.1 WEAKNESS: ICD-10-CM

## 2023-07-17 DIAGNOSIS — D50.8 OTHER IRON DEFICIENCY ANEMIAS: ICD-10-CM

## 2023-07-17 DIAGNOSIS — E55.9 VITAMIN D DEFICIENCY, UNSPECIFIED: ICD-10-CM

## 2023-07-22 ENCOUNTER — LAB SERVICES (OUTPATIENT)
Dept: LAB | Age: 83
End: 2023-07-22

## 2023-07-22 LAB
25(OH)D3+25(OH)D2 SERPL-MCNC: 54.7 NG/ML (ref 30–100)
ALBUMIN SERPL-MCNC: 4.1 G/DL (ref 3.6–5.1)
ALBUMIN/GLOB SERPL: 1 {RATIO} (ref 1–2.4)
ALP SERPL-CCNC: 137 UNITS/L (ref 45–117)
ALT SERPL-CCNC: 20 UNITS/L
ANION GAP SERPL CALC-SCNC: 12 MMOL/L (ref 7–19)
AST SERPL-CCNC: 22 UNITS/L
BASOPHILS # BLD: 0.1 K/MCL (ref 0–0.3)
BASOPHILS NFR BLD: 1 %
BILIRUB SERPL-MCNC: 1.2 MG/DL (ref 0.2–1)
BUN SERPL-MCNC: 28 MG/DL (ref 6–20)
BUN/CREAT SERPL: 21 (ref 7–25)
CALCIUM SERPL-MCNC: 9.7 MG/DL (ref 8.4–10.2)
CHLORIDE SERPL-SCNC: 107 MMOL/L (ref 97–110)
CHOLEST SERPL-MCNC: 157 MG/DL
CHOLEST/HDLC SERPL: 3 {RATIO}
CO2 SERPL-SCNC: 28 MMOL/L (ref 21–32)
CREAT SERPL-MCNC: 1.31 MG/DL (ref 0.51–0.95)
DEPRECATED RDW RBC: 52.7 FL (ref 39–50)
EOSINOPHIL # BLD: 0.9 K/MCL (ref 0–0.5)
EOSINOPHIL NFR BLD: 10 %
ERYTHROCYTE [DISTWIDTH] IN BLOOD: 15.1 % (ref 11–15)
FASTING DURATION TIME PATIENT: ABNORMAL H
GFR SERPLBLD BASED ON 1.73 SQ M-ARVRAT: 40 ML/MIN
GLOBULIN SER-MCNC: 4.3 G/DL (ref 2–4)
GLUCOSE SERPL-MCNC: 109 MG/DL (ref 70–99)
HCT VFR BLD CALC: 38.5 % (ref 36–46.5)
HDLC SERPL-MCNC: 52 MG/DL
HGB BLD-MCNC: 11.6 G/DL (ref 12–15.5)
IMM GRANULOCYTES # BLD AUTO: 0 K/MCL (ref 0–0.2)
IMM GRANULOCYTES # BLD: 0 %
IRON SATN MFR SERPL: 19 % (ref 15–45)
IRON SERPL-MCNC: 51 MCG/DL (ref 50–170)
LDLC SERPL CALC-MCNC: 71 MG/DL
LYMPHOCYTES # BLD: 1.5 K/MCL (ref 1–4)
LYMPHOCYTES NFR BLD: 17 %
MCH RBC QN AUTO: 28.7 PG (ref 26–34)
MCHC RBC AUTO-ENTMCNC: 30.1 G/DL (ref 32–36.5)
MCV RBC AUTO: 95.3 FL (ref 78–100)
MONOCYTES # BLD: 0.8 K/MCL (ref 0.3–0.9)
MONOCYTES NFR BLD: 9 %
NEUTROPHILS # BLD: 5.9 K/MCL (ref 1.8–7.7)
NEUTROPHILS NFR BLD: 63 %
NONHDLC SERPL-MCNC: 105 MG/DL
NRBC BLD MANUAL-RTO: 0 /100 WBC
PLATELET # BLD AUTO: 253 K/MCL (ref 140–450)
POTASSIUM SERPL-SCNC: 4.7 MMOL/L (ref 3.4–5.1)
PROT SERPL-MCNC: 8.4 G/DL (ref 6.4–8.2)
RBC # BLD: 4.04 MIL/MCL (ref 4–5.2)
SODIUM SERPL-SCNC: 142 MMOL/L (ref 135–145)
TIBC SERPL-MCNC: 262 MCG/DL (ref 250–450)
TRIGL SERPL-MCNC: 170 MG/DL
TSH SERPL-ACNC: 4.87 MCUNITS/ML (ref 0.35–5)
WBC # BLD: 9.1 K/MCL (ref 4.2–11)

## 2023-07-22 PROCEDURE — 83540 ASSAY OF IRON: CPT | Performed by: CLINICAL MEDICAL LABORATORY

## 2023-07-22 PROCEDURE — 85025 COMPLETE CBC W/AUTO DIFF WBC: CPT | Performed by: CLINICAL MEDICAL LABORATORY

## 2023-07-22 PROCEDURE — 80061 LIPID PANEL: CPT | Performed by: CLINICAL MEDICAL LABORATORY

## 2023-07-22 PROCEDURE — 83550 IRON BINDING TEST: CPT | Performed by: CLINICAL MEDICAL LABORATORY

## 2023-07-22 PROCEDURE — 36415 COLL VENOUS BLD VENIPUNCTURE: CPT | Performed by: CLINICAL MEDICAL LABORATORY

## 2023-07-22 PROCEDURE — 82306 VITAMIN D 25 HYDROXY: CPT | Performed by: CLINICAL MEDICAL LABORATORY

## 2023-07-22 PROCEDURE — 84443 ASSAY THYROID STIM HORMONE: CPT | Performed by: CLINICAL MEDICAL LABORATORY

## 2023-07-22 PROCEDURE — 80053 COMPREHEN METABOLIC PANEL: CPT | Performed by: CLINICAL MEDICAL LABORATORY

## 2023-09-11 ENCOUNTER — PATIENT OUTREACH (OUTPATIENT)
Dept: CASE MANAGEMENT | Age: 83
End: 2023-09-11

## 2023-12-21 ENCOUNTER — CASE MANAGEMENT (OUTPATIENT)
Dept: CARE COORDINATION | Age: 83
End: 2023-12-21

## 2023-12-22 ENCOUNTER — CASE MANAGEMENT (OUTPATIENT)
Dept: CARE COORDINATION | Age: 83
End: 2023-12-22

## 2023-12-26 ENCOUNTER — CASE MANAGEMENT (OUTPATIENT)
Dept: CARE COORDINATION | Age: 83
End: 2023-12-26

## 2023-12-26 SDOH — ECONOMIC STABILITY: HOUSING INSECURITY: WHAT IS YOUR LIVING SITUATION TODAY?: I HAVE A STEADY PLACE TO LIVE

## 2023-12-26 SDOH — HEALTH STABILITY: PHYSICAL HEALTH: ON AVERAGE, HOW MANY MINUTES DO YOU ENGAGE IN EXERCISE AT THIS LEVEL?: 0 MIN

## 2023-12-26 SDOH — HEALTH STABILITY: MENTAL HEALTH: PHQ2 INTERPRETATION: NO FURTHER SCREENING NEEDED

## 2023-12-26 SDOH — SOCIAL STABILITY: SOCIAL INSECURITY: HOW OFTEN DOES ANYONE, INCLUDING FAMILY AND FRIENDS, SCREAM OR CURSE AT YOU?: NEVER

## 2023-12-26 SDOH — ECONOMIC STABILITY: GENERAL

## 2023-12-26 SDOH — HEALTH STABILITY: MENTAL HEALTH: HOW MANY STANDARD DRINKS CONTAINING ALCOHOL DO YOU HAVE ON A TYPICAL DAY?: 0,1 OR 2

## 2023-12-26 SDOH — HEALTH STABILITY: MENTAL HEALTH: AUDIT TOTAL SCORE: 0

## 2023-12-26 SDOH — SOCIAL STABILITY: SOCIAL INSECURITY: HOW OFTEN DOES ANYONE, INCLUDING FAMILY AND FRIENDS, PHYSICALLY HURT YOU?: NEVER

## 2023-12-26 SDOH — HEALTH STABILITY: MENTAL HEALTH: DEPRESSION SCREENING SCORE: 0

## 2023-12-26 SDOH — ECONOMIC STABILITY: FOOD INSECURITY: WITHIN THE PAST 12 MONTHS, THE FOOD YOU BOUGHT JUST DIDN'T LAST AND YOU DIDN'T HAVE MONEY TO GET MORE.: NEVER TRUE

## 2023-12-26 SDOH — HEALTH STABILITY: PHYSICAL HEALTH: ON AVERAGE, HOW MANY DAYS PER WEEK DO YOU ENGAGE IN MODERATE TO STRENUOUS EXERCISE (LIKE A BRISK WALK)?: 0 DAYS

## 2023-12-26 SDOH — SOCIAL STABILITY: SOCIAL INSECURITY: HOW OFTEN DOES ANYONE, INCLUDING FAMILY AND FRIENDS, INSULT OR TALK DOWN TO YOU?: NEVER

## 2023-12-26 SDOH — SOCIAL STABILITY: SOCIAL INSECURITY: HOW OFTEN DOES ANYONE, INCLUDING FAMILY AND FRIENDS, THREATEN YOU WITH HARM?: NEVER

## 2023-12-26 SDOH — HEALTH STABILITY: MENTAL HEALTH: HOW OFTEN DO YOU HAVE A DRINK CONTAINING ALCOHOL?: NEVER

## 2023-12-26 SDOH — HEALTH STABILITY: MENTAL HEALTH: FEELING DOWN, DEPRESSED OR HOPELESS?: NOT AT ALL

## 2023-12-26 SDOH — ECONOMIC STABILITY: HOUSING INSECURITY: DO YOU HAVE STABLE HOUSING?: STABLE

## 2023-12-26 SDOH — ECONOMIC STABILITY: TRANSPORTATION INSECURITY
IN THE PAST 12 MONTHS, HAS LACK OF RELIABLE TRANSPORTATION KEPT YOU FROM MEDICAL APPOINTMENTS, MEETINGS, WORK OR FROM GETTING THINGS NEEDED FOR DAILY LIVING?: NO

## 2023-12-26 SDOH — ECONOMIC STABILITY: HOUSING INSECURITY: DO YOU HAVE PROBLEMS WITH ANY OF THE FOLLOWING?: NONE OF THE ABOVE

## 2023-12-26 SDOH — HEALTH STABILITY: MENTAL HEALTH: LITTLE INTEREST OR PLEASURE IN ACTIVITY?: NOT AT ALL

## 2023-12-26 SDOH — ECONOMIC STABILITY: HOUSING INSECURITY: WHAT IS YOUR LIVING SITUATION TODAY?: APARTMENT

## 2023-12-26 SDOH — SOCIAL STABILITY: SOCIAL NETWORK
HOW OFTEN DO YOU SEE OR TALK TO PEOPLE THAT YOU CARE ABOUT AND FEEL CLOSE TO? (FOR EXAMPLE: TALKING TO FRIENDS ON THE PHONE, VISITING FRIENDS OR FAMILY, GOING TO CHURCH OR CLUB MEETINGS): 5 OR MORE TIMES A WEEK

## 2023-12-26 SDOH — HEALTH STABILITY: MENTAL HEALTH: HOW OFTEN DO YOU HAVE 6 OR MORE DRINKS ON ONE OCCASION?: NEVER

## 2023-12-26 ASSESSMENT — ACTIVITIES OF DAILY LIVING (ADL)
TOILETING: INDEPENDENT
AMBULATION: INDEPENDENT
BATHING: INDEPENDENT
DME_IN_USE: WALKER
DRIVING: TOTAL ASSIST
DRESSING: INDEPENDENT
EATING: INDEPENDENT
FUNCTIONAL_EVALUATION: PERFORMS ADLS INDEPENDENTLY
GROOMING: INDEPENDENT
MOBILITY: GAIT IMPAIRMENT

## 2023-12-26 ASSESSMENT — PATIENT HEALTH QUESTIONNAIRE - PHQ9: SUM OF ALL RESPONSES TO PHQ9 QUESTIONS 1 AND 2: 0

## 2024-01-08 ENCOUNTER — LAB REQUISITION (OUTPATIENT)
Dept: LAB | Age: 84
End: 2024-01-08

## 2024-01-08 DIAGNOSIS — E03.9 HYPOTHYROIDISM, UNSPECIFIED: ICD-10-CM

## 2024-01-08 DIAGNOSIS — E55.9 VITAMIN D DEFICIENCY, UNSPECIFIED: ICD-10-CM

## 2024-01-08 DIAGNOSIS — I10 ESSENTIAL (PRIMARY) HYPERTENSION: ICD-10-CM

## 2024-01-08 DIAGNOSIS — D50.8 OTHER IRON DEFICIENCY ANEMIAS: ICD-10-CM

## 2024-01-10 ENCOUNTER — CASE MANAGEMENT (OUTPATIENT)
Dept: CARE COORDINATION | Age: 84
End: 2024-01-10

## 2024-02-03 ENCOUNTER — LAB SERVICES (OUTPATIENT)
Dept: LAB | Age: 84
End: 2024-02-03

## 2024-02-03 LAB
25(OH)D3+25(OH)D2 SERPL-MCNC: 78.7 NG/ML (ref 30–100)
ALBUMIN SERPL-MCNC: 4.1 G/DL (ref 3.6–5.1)
ALBUMIN/GLOB SERPL: 1 {RATIO} (ref 1–2.4)
ALP SERPL-CCNC: 156 UNITS/L (ref 45–117)
ALT SERPL-CCNC: 18 UNITS/L
ANION GAP SERPL CALC-SCNC: 12 MMOL/L (ref 7–19)
AST SERPL-CCNC: 18 UNITS/L
BASOPHILS # BLD: 0.1 K/MCL (ref 0–0.3)
BASOPHILS NFR BLD: 1 %
BILIRUB SERPL-MCNC: 1.2 MG/DL (ref 0.2–1)
BUN SERPL-MCNC: 20 MG/DL (ref 6–20)
BUN/CREAT SERPL: 20 (ref 7–25)
CALCIUM SERPL-MCNC: 9.4 MG/DL (ref 8.4–10.2)
CHLORIDE SERPL-SCNC: 103 MMOL/L (ref 97–110)
CHOLEST SERPL-MCNC: 172 MG/DL
CHOLEST/HDLC SERPL: 3.2 {RATIO}
CO2 SERPL-SCNC: 28 MMOL/L (ref 21–32)
CREAT SERPL-MCNC: 1 MG/DL (ref 0.51–0.95)
DEPRECATED RDW RBC: 57.3 FL (ref 39–50)
EGFRCR SERPLBLD CKD-EPI 2021: 56 ML/MIN/{1.73_M2}
EOSINOPHIL # BLD: 0.6 K/MCL (ref 0–0.5)
EOSINOPHIL NFR BLD: 8 %
ERYTHROCYTE [DISTWIDTH] IN BLOOD: 16.3 % (ref 11–15)
FASTING DURATION TIME PATIENT: ABNORMAL H
GLOBULIN SER-MCNC: 4.1 G/DL (ref 2–4)
GLUCOSE SERPL-MCNC: 120 MG/DL (ref 70–99)
HCT VFR BLD CALC: 39.1 % (ref 36–46.5)
HDLC SERPL-MCNC: 53 MG/DL
HGB BLD-MCNC: 11.9 G/DL (ref 12–15.5)
IMM GRANULOCYTES # BLD AUTO: 0 K/MCL (ref 0–0.2)
IMM GRANULOCYTES # BLD: 0 %
IRON SATN MFR SERPL: 24 % (ref 15–45)
IRON SERPL-MCNC: 66 MCG/DL (ref 50–170)
LDLC SERPL CALC-MCNC: 74 MG/DL
LYMPHOCYTES # BLD: 1.3 K/MCL (ref 1–4)
LYMPHOCYTES NFR BLD: 16 %
MCH RBC QN AUTO: 29.2 PG (ref 26–34)
MCHC RBC AUTO-ENTMCNC: 30.4 G/DL (ref 32–36.5)
MCV RBC AUTO: 95.8 FL (ref 78–100)
MONOCYTES # BLD: 0.6 K/MCL (ref 0.3–0.9)
MONOCYTES NFR BLD: 7 %
NEUTROPHILS # BLD: 5.6 K/MCL (ref 1.8–7.7)
NEUTROPHILS NFR BLD: 68 %
NONHDLC SERPL-MCNC: 119 MG/DL
NRBC BLD MANUAL-RTO: 0 /100 WBC
PLATELET # BLD AUTO: 277 K/MCL (ref 140–450)
POTASSIUM SERPL-SCNC: 4.3 MMOL/L (ref 3.4–5.1)
PROT SERPL-MCNC: 8.2 G/DL (ref 6.4–8.2)
RBC # BLD: 4.08 MIL/MCL (ref 4–5.2)
SODIUM SERPL-SCNC: 139 MMOL/L (ref 135–145)
TIBC SERPL-MCNC: 270 MCG/DL (ref 250–450)
TRIGL SERPL-MCNC: 225 MG/DL
TSH SERPL-ACNC: 3.15 MCUNITS/ML (ref 0.35–5)
WBC # BLD: 8.2 K/MCL (ref 4.2–11)

## 2024-02-03 PROCEDURE — 85025 COMPLETE CBC W/AUTO DIFF WBC: CPT | Performed by: CLINICAL MEDICAL LABORATORY

## 2024-02-03 PROCEDURE — 84443 ASSAY THYROID STIM HORMONE: CPT | Performed by: CLINICAL MEDICAL LABORATORY

## 2024-02-03 PROCEDURE — 82306 VITAMIN D 25 HYDROXY: CPT | Performed by: CLINICAL MEDICAL LABORATORY

## 2024-02-03 PROCEDURE — 83540 ASSAY OF IRON: CPT | Performed by: CLINICAL MEDICAL LABORATORY

## 2024-02-03 PROCEDURE — 80053 COMPREHEN METABOLIC PANEL: CPT | Performed by: CLINICAL MEDICAL LABORATORY

## 2024-02-03 PROCEDURE — 80061 LIPID PANEL: CPT | Performed by: CLINICAL MEDICAL LABORATORY

## 2024-02-03 PROCEDURE — 83550 IRON BINDING TEST: CPT | Performed by: CLINICAL MEDICAL LABORATORY

## 2024-10-14 ENCOUNTER — LAB REQUISITION (OUTPATIENT)
Dept: LAB | Age: 84
End: 2024-10-14

## 2024-10-14 DIAGNOSIS — E55.9 VITAMIN D DEFICIENCY, UNSPECIFIED: ICD-10-CM

## 2024-10-14 DIAGNOSIS — R53.1 WEAKNESS: ICD-10-CM

## 2024-10-14 DIAGNOSIS — E78.5 HYPERLIPIDEMIA, UNSPECIFIED: ICD-10-CM

## 2024-10-14 DIAGNOSIS — D50.8 OTHER IRON DEFICIENCY ANEMIAS: ICD-10-CM

## 2024-10-14 DIAGNOSIS — E03.9 HYPOTHYROIDISM, UNSPECIFIED: ICD-10-CM

## 2024-10-18 ENCOUNTER — LAB SERVICES (OUTPATIENT)
Dept: LAB | Age: 84
End: 2024-10-18

## 2024-10-18 PROCEDURE — 82306 VITAMIN D 25 HYDROXY: CPT | Performed by: CLINICAL MEDICAL LABORATORY

## 2024-10-18 PROCEDURE — 80061 LIPID PANEL: CPT | Performed by: CLINICAL MEDICAL LABORATORY

## 2024-10-18 PROCEDURE — 85025 COMPLETE CBC W/AUTO DIFF WBC: CPT | Performed by: CLINICAL MEDICAL LABORATORY

## 2024-10-18 PROCEDURE — 83550 IRON BINDING TEST: CPT | Performed by: CLINICAL MEDICAL LABORATORY

## 2024-10-18 PROCEDURE — 84443 ASSAY THYROID STIM HORMONE: CPT | Performed by: CLINICAL MEDICAL LABORATORY

## 2024-10-18 PROCEDURE — 82728 ASSAY OF FERRITIN: CPT | Performed by: CLINICAL MEDICAL LABORATORY

## 2024-10-18 PROCEDURE — 80053 COMPREHEN METABOLIC PANEL: CPT | Performed by: CLINICAL MEDICAL LABORATORY

## 2024-10-18 PROCEDURE — 83540 ASSAY OF IRON: CPT | Performed by: CLINICAL MEDICAL LABORATORY

## 2024-10-19 LAB
25(OH)D3+25(OH)D2 SERPL-MCNC: 60.8 NG/ML (ref 30–100)
ALBUMIN SERPL-MCNC: 4.2 G/DL (ref 3.4–5)
ALBUMIN/GLOB SERPL: 1.2 {RATIO} (ref 1–2.4)
ALP SERPL-CCNC: 140 UNITS/L (ref 45–117)
ALT SERPL-CCNC: 18 UNITS/L
ANION GAP SERPL CALC-SCNC: 10 MMOL/L (ref 7–19)
AST SERPL-CCNC: 15 UNITS/L
BASOPHILS # BLD: 0 K/MCL (ref 0–0.3)
BASOPHILS NFR BLD: 1 %
BILIRUB SERPL-MCNC: 1.3 MG/DL (ref 0.2–1)
BUN SERPL-MCNC: 25 MG/DL (ref 6–20)
BUN/CREAT SERPL: 19 (ref 7–25)
CALCIUM SERPL-MCNC: 9.5 MG/DL (ref 8.4–10.2)
CHLORIDE SERPL-SCNC: 106 MMOL/L (ref 97–110)
CHOLEST SERPL-MCNC: 157 MG/DL
CHOLEST/HDLC SERPL: 2.6 {RATIO}
CO2 SERPL-SCNC: 28 MMOL/L (ref 21–32)
CREAT SERPL-MCNC: 1.31 MG/DL (ref 0.51–0.95)
DEPRECATED RDW RBC: 55.6 FL (ref 39–50)
EGFRCR SERPLBLD CKD-EPI 2021: 40 ML/MIN/{1.73_M2}
EOSINOPHIL # BLD: 0.5 K/MCL (ref 0–0.5)
EOSINOPHIL NFR BLD: 6 %
ERYTHROCYTE [DISTWIDTH] IN BLOOD: 16 % (ref 11–15)
FASTING DURATION TIME PATIENT: 8 HOURS (ref 0–999)
FERRITIN SERPL-MCNC: 555 NG/ML (ref 8–252)
GLOBULIN SER-MCNC: 3.4 G/DL (ref 2–4)
GLUCOSE SERPL-MCNC: 106 MG/DL (ref 70–99)
HCT VFR BLD CALC: 37.7 % (ref 36–46.5)
HDLC SERPL-MCNC: 61 MG/DL
HGB BLD-MCNC: 11.6 G/DL (ref 12–15.5)
IMM GRANULOCYTES # BLD AUTO: 0 K/MCL (ref 0–0.2)
IMM GRANULOCYTES # BLD: 0 %
IRON SATN MFR SERPL: 18 % (ref 15–45)
IRON SERPL-MCNC: 43 MCG/DL (ref 50–170)
LDLC SERPL CALC-MCNC: 60 MG/DL
LYMPHOCYTES # BLD: 1.3 K/MCL (ref 1–4)
LYMPHOCYTES NFR BLD: 18 %
MCH RBC QN AUTO: 28.6 PG (ref 26–34)
MCHC RBC AUTO-ENTMCNC: 30.8 G/DL (ref 32–36.5)
MCV RBC AUTO: 92.9 FL (ref 78–100)
MONOCYTES # BLD: 0.5 K/MCL (ref 0.3–0.9)
MONOCYTES NFR BLD: 8 %
NEUTROPHILS # BLD: 4.7 K/MCL (ref 1.8–7.7)
NEUTROPHILS NFR BLD: 67 %
NONHDLC SERPL-MCNC: 96 MG/DL
NRBC BLD MANUAL-RTO: 0 /100 WBC
PLATELET # BLD AUTO: 235 K/MCL (ref 140–450)
POTASSIUM SERPL-SCNC: 3.9 MMOL/L (ref 3.4–5.1)
PROT SERPL-MCNC: 7.6 G/DL (ref 6.4–8.2)
RBC # BLD: 4.06 MIL/MCL (ref 4–5.2)
SODIUM SERPL-SCNC: 140 MMOL/L (ref 135–145)
TIBC SERPL-MCNC: 239 MCG/DL (ref 250–450)
TRIGL SERPL-MCNC: 178 MG/DL
TSH SERPL-ACNC: 3.59 MCUNITS/ML (ref 0.35–5)
WBC # BLD: 7 K/MCL (ref 4.2–11)

## 2025-04-04 ENCOUNTER — TELEPHONE (OUTPATIENT)
Age: 85
End: 2025-04-04

## 2025-05-08 ENCOUNTER — LAB SERVICES (OUTPATIENT)
Dept: LAB | Age: 85
End: 2025-05-08

## 2025-05-08 ENCOUNTER — LAB REQUISITION (OUTPATIENT)
Dept: LAB | Age: 85
End: 2025-05-08

## 2025-05-08 DIAGNOSIS — E03.9 HYPOTHYROIDISM, UNSPECIFIED: ICD-10-CM

## 2025-05-08 DIAGNOSIS — E78.5 HYPERLIPIDEMIA, UNSPECIFIED: ICD-10-CM

## 2025-05-08 DIAGNOSIS — Z12.11 ENCOUNTER FOR SCREENING FOR MALIGNANT NEOPLASM OF COLON: ICD-10-CM

## 2025-05-08 DIAGNOSIS — E55.9 VITAMIN D DEFICIENCY, UNSPECIFIED: ICD-10-CM

## 2025-05-08 DIAGNOSIS — R25.2 CRAMP AND SPASM: ICD-10-CM

## 2025-05-08 DIAGNOSIS — R73.9 HYPERGLYCEMIA, UNSPECIFIED: ICD-10-CM

## 2025-05-08 DIAGNOSIS — I10 ESSENTIAL (PRIMARY) HYPERTENSION: ICD-10-CM

## 2025-05-08 DIAGNOSIS — E53.8 DEFICIENCY OF OTHER SPECIFIED B GROUP VITAMINS: ICD-10-CM

## 2025-05-08 DIAGNOSIS — D50.8 OTHER IRON DEFICIENCY ANEMIAS: ICD-10-CM

## 2025-05-08 PROCEDURE — 84443 ASSAY THYROID STIM HORMONE: CPT | Performed by: CLINICAL MEDICAL LABORATORY

## 2025-05-08 PROCEDURE — 80053 COMPREHEN METABOLIC PANEL: CPT | Performed by: CLINICAL MEDICAL LABORATORY

## 2025-05-08 PROCEDURE — 83540 ASSAY OF IRON: CPT | Performed by: CLINICAL MEDICAL LABORATORY

## 2025-05-08 PROCEDURE — 82728 ASSAY OF FERRITIN: CPT | Performed by: CLINICAL MEDICAL LABORATORY

## 2025-05-08 PROCEDURE — 80061 LIPID PANEL: CPT | Performed by: CLINICAL MEDICAL LABORATORY

## 2025-05-08 PROCEDURE — 83036 HEMOGLOBIN GLYCOSYLATED A1C: CPT | Performed by: CLINICAL MEDICAL LABORATORY

## 2025-05-08 PROCEDURE — 83735 ASSAY OF MAGNESIUM: CPT | Performed by: CLINICAL MEDICAL LABORATORY

## 2025-05-08 PROCEDURE — 82607 VITAMIN B-12: CPT | Performed by: CLINICAL MEDICAL LABORATORY

## 2025-05-08 PROCEDURE — 82306 VITAMIN D 25 HYDROXY: CPT | Performed by: CLINICAL MEDICAL LABORATORY

## 2025-05-08 PROCEDURE — 85025 COMPLETE CBC W/AUTO DIFF WBC: CPT | Performed by: CLINICAL MEDICAL LABORATORY

## 2025-05-08 PROCEDURE — 83550 IRON BINDING TEST: CPT | Performed by: CLINICAL MEDICAL LABORATORY

## 2025-05-09 LAB
25(OH)D3+25(OH)D2 SERPL-MCNC: 71.6 NG/ML (ref 30–100)
ALBUMIN SERPL-MCNC: 4.1 G/DL (ref 3.4–5)
ALBUMIN/GLOB SERPL: 1.3 {RATIO} (ref 1–2.4)
ALP SERPL-CCNC: 140 UNITS/L (ref 45–117)
ALT SERPL-CCNC: 16 UNITS/L
ANION GAP SERPL CALC-SCNC: 13 MMOL/L (ref 7–19)
AST SERPL-CCNC: 17 UNITS/L
BASOPHILS # BLD: 0 K/MCL (ref 0–0.3)
BASOPHILS NFR BLD: 1 %
BILIRUB SERPL-MCNC: 1.2 MG/DL (ref 0.2–1)
BUN SERPL-MCNC: 24 MG/DL (ref 6–20)
BUN/CREAT SERPL: 19 (ref 7–25)
CALCIUM SERPL-MCNC: 9.7 MG/DL (ref 8.4–10.2)
CHLORIDE SERPL-SCNC: 101 MMOL/L (ref 97–110)
CHOLEST SERPL-MCNC: 148 MG/DL
CHOLEST/HDLC SERPL: 2.8 {RATIO}
CO2 SERPL-SCNC: 28 MMOL/L (ref 21–32)
CREAT SERPL-MCNC: 1.26 MG/DL (ref 0.51–0.95)
DEPRECATED RDW RBC: 54.2 FL (ref 39–50)
EGFRCR SERPLBLD CKD-EPI 2021: 42 ML/MIN/{1.73_M2}
EOSINOPHIL # BLD: 0.4 K/MCL (ref 0–0.5)
EOSINOPHIL NFR BLD: 5 %
ERYTHROCYTE [DISTWIDTH] IN BLOOD: 16.5 % (ref 11–15)
FASTING DURATION TIME PATIENT: 0 HOURS (ref 0–999)
FERRITIN SERPL-MCNC: 548 NG/ML (ref 8–252)
GLOBULIN SER-MCNC: 3.1 G/DL (ref 2–4)
GLUCOSE SERPL-MCNC: 93 MG/DL (ref 70–99)
HBA1C MFR BLD: 5.4 % (ref 4.5–5.6)
HCT VFR BLD CALC: 35 % (ref 36–46.5)
HDLC SERPL-MCNC: 52 MG/DL
HGB BLD-MCNC: 11.3 G/DL (ref 12–15.5)
IMM GRANULOCYTES # BLD AUTO: 0 K/MCL (ref 0–0.2)
IMM GRANULOCYTES # BLD: 0 %
IRON SATN MFR SERPL: 19 % (ref 15–45)
IRON SERPL-MCNC: 45 MCG/DL (ref 50–170)
LDLC SERPL CALC-MCNC: 54 MG/DL
LYMPHOCYTES # BLD: 1 K/MCL (ref 1–4)
LYMPHOCYTES NFR BLD: 14 %
MAGNESIUM SERPL-MCNC: 2.5 MG/DL (ref 1.7–2.4)
MCH RBC QN AUTO: 29 PG (ref 26–34)
MCHC RBC AUTO-ENTMCNC: 32.3 G/DL (ref 32–36.5)
MCV RBC AUTO: 89.7 FL (ref 78–100)
MONOCYTES # BLD: 0.7 K/MCL (ref 0.3–0.9)
MONOCYTES NFR BLD: 9 %
NEUTROPHILS # BLD: 5.4 K/MCL (ref 1.8–7.7)
NEUTROPHILS NFR BLD: 71 %
NONHDLC SERPL-MCNC: 96 MG/DL
NRBC BLD MANUAL-RTO: 0 /100 WBC
PLATELET # BLD AUTO: 250 K/MCL (ref 140–450)
POTASSIUM SERPL-SCNC: 4.6 MMOL/L (ref 3.4–5.1)
PROT SERPL-MCNC: 7.2 G/DL (ref 6.4–8.2)
RBC # BLD: 3.9 MIL/MCL (ref 4–5.2)
SODIUM SERPL-SCNC: 137 MMOL/L (ref 135–145)
TIBC SERPL-MCNC: 243 MCG/DL (ref 250–450)
TRIGL SERPL-MCNC: 209 MG/DL
TSH SERPL-ACNC: 3.74 MCUNITS/ML (ref 0.35–5)
VIT B12 SERPL-MCNC: 1103 PG/ML (ref 211–911)
WBC # BLD: 7.6 K/MCL (ref 4.2–11)

## 2025-05-21 PROCEDURE — 82274 ASSAY TEST FOR BLOOD FECAL: CPT | Performed by: CLINICAL MEDICAL LABORATORY

## 2025-05-22 LAB — HEMOCCULT STL QL: NEGATIVE

## (undated) DEVICE — STERILE POLYISOPRENE POWDER-FREE SURGICAL GLOVES: Brand: PROTEXIS

## (undated) DEVICE — 3M(TM) TEGADERM(TM) TRANSPARENT FILM DRESSING FRAME STYLE 1628: Brand: 3M™ TEGADERM™

## (undated) DEVICE — HOOK LOCK LATEX FREE ELASTIC BANDAGE 4INX5YD

## (undated) DEVICE — HOOK LOCK LATEX FREE ELASTIC BANDAGE 6INX5YD

## (undated) DEVICE — BLADE STERNAL SAW BULK PACK

## (undated) DEVICE — Device: Brand: DEFENDO AIR/WATER/SUCTION AND BIOPSY VALVE

## (undated) DEVICE — TRANSPOSAL ULTRAFLEX DUO/QUAD ULTRA CART MANIFOLD

## (undated) DEVICE — SOLUTION SURG DURA PREP HAZMAT

## (undated) DEVICE — SUTURE VICRYL 2-0 CT-1

## (undated) DEVICE — 3M™ STERI-STRIP™ REINFORCED ADHESIVE SKIN CLOSURES, R1547, 1/2 IN X 4 IN (12 MM X 100 MM), 6 STRIPS/ENVELOPE: Brand: 3M™ STERI-STRIP™

## (undated) DEVICE — SPONGE: LAP 18X18 W XR 200/CS: Brand: MEDICAL ACTION INDUSTRIES

## (undated) DEVICE — SUTURE PROLENE 8-0 BV-130-5

## (undated) DEVICE — TOWEL: OR BLU 80/CS: Brand: MEDICAL ACTION INDUSTRIES

## (undated) DEVICE — PLEURX PLEURAL CATHETER AND STARTER KIT - FOR PLEURAL PLACEMENT ONLY AND FOR PATIENT HOME USE: Brand: PLEURX

## (undated) DEVICE — GAUZE SPONGES,12 PLY: Brand: CURITY

## (undated) DEVICE — FORCEP BIOPSY RJ4 LG CAP W/ND

## (undated) DEVICE — GLOVE SURG TRIUMPH SZ 8

## (undated) DEVICE — SUTURE PROLENE 6-0 C-1

## (undated) DEVICE — BLOWER CO2 MEDTRONIC/DLP 22150

## (undated) DEVICE — Device

## (undated) DEVICE — ENDOSCOPY PACK - LOWER: Brand: MEDLINE INDUSTRIES, INC.

## (undated) DEVICE — FILTERLINE NASAL ADULT O2/CO2

## (undated) DEVICE — SOL LACT RINGERS 1000ML

## (undated) DEVICE — SUTURE SILK 0 FSL

## (undated) DEVICE — DRAIN SILICONE FLAT 7MM

## (undated) DEVICE — INTENDED TO BE USED TO OCCLUDE, RETRACT AND IDENTIFY ARTERIES, VEINS, TENDONS AND NERVES IN SURGICAL PROCEDURES: Brand: STERION®  VESSEL LOOP

## (undated) DEVICE — MEDI-VAC NON-CONDUCTIVE SUCTION TUBING: Brand: CARDINAL HEALTH

## (undated) DEVICE — #15 STERILE STAINLESS BLADE: Brand: STERILE STAINLESS BLADES

## (undated) DEVICE — OPEN HEART: Brand: MEDLINE INDUSTRIES, INC.

## (undated) DEVICE — ENDOSCOPY PACK UPPER: Brand: MEDLINE INDUSTRIES, INC.

## (undated) DEVICE — TRAY ENDOVEIN KTV16 HARVESTING

## (undated) DEVICE — CELL SAVER RESERVOIR BRAT

## (undated) DEVICE — FIXED CORE WIRE GUIDE SAFE-T-J, CURVED: Brand: COOK

## (undated) DEVICE — SHEET,DRAPE,70X100,STERILE: Brand: MEDLINE

## (undated) DEVICE — SPECIMEN CONTAINER,POSITIVE SEAL INDICATOR, OR PACKAGED: Brand: PRECISION

## (undated) DEVICE — 1200CC GUARDIAN II: Brand: GUARDIAN

## (undated) DEVICE — PDS II VLT 0 107CM AG ST3: Brand: ENDOLOOP

## (undated) DEVICE — LAPAROTOMY CDS: Brand: MEDLINE INDUSTRIES, INC.

## (undated) DEVICE — TIBURON DRAPE TOWELS: Brand: CONVERTORS

## (undated) DEVICE — DRAIN RELIAVAC 100CC

## (undated) DEVICE — SUTURE SILK 2-0

## (undated) DEVICE — SYRINGE 20CC LL TIP

## (undated) DEVICE — 3M™ RED DOT™ MONITORING ELECTRODE WITH FOAM TAPE AND STICKY GEL, 50/BAG, 20/CASE, 72/PLT 2570: Brand: RED DOT™

## (undated) DEVICE — CELL SAVER BAG 600ML 4R2023

## (undated) DEVICE — 3M™ MEDIPORE™ + PAD 3573: Brand: 3M™ MEDIPORE™

## (undated) DEVICE — SUTURE VICRYL 3-0 PS-1

## (undated) DEVICE — DRAPE TABLE COVER 44X90 TC-10

## (undated) DEVICE — GOWN,SIRUS,FABRIC-REINFORCED,X-LARGE: Brand: MEDLINE

## (undated) DEVICE — DRAPE SLUSH/WARMER W/DISC

## (undated) DEVICE — MEDI-VAC SUCTION HANDLE REGULAR CAPACITY: Brand: CARDINAL HEALTH

## (undated) DEVICE — SUTURE POLYDEK 2-0

## (undated) DEVICE — CELL SAVER 5/5+ BOWL KIT-225ML: Brand: HAEMONETICS CELL SAVER 5/5+ SYSTEMS

## (undated) DEVICE — STANDARD HYPODERMIC NEEDLE,POLYPROPYLENE HUB: Brand: MONOJECT

## (undated) DEVICE — DRAPE HALF 40X58 DYNJP2410

## (undated) NOTE — LETTER
Truman Power M.D., F.A.C.S. Cirilo Najera M.D., F.A.C.S. Tito Jauregui M.D., Luis Patricio. UMESH Reynolds M.D., F.A.C.S. Juanita Toledo M.D., F.A.C.S. KANIKA Perea M. Beaulah Bidding A.D. Dallie Genta, M.D., F. chance to have all of your questions and concerns answered. If there are any issues which have not been adequately addressed, we ask you to bring them forward so that we can thoroughly address them.     A patient who is fully informed and understands their treatment, among other options and the risks and benefits of the different treatment options:    Yes _____ No _____    A [facility] surgeon as explained to me that if I should so desire, he/she is willing to explain my case and the surgical and non-surgical optio

## (undated) NOTE — MR AVS SNAPSHOT
[facility]  [address] [place] [address] [phone]               Thank you for choosing us for your health care visit with Arik Johnson MD.  We are glad to serve you and happy to provide you with this Take 1 tablet by mouth 2 (two) times daily. docusate sodium 100 MG Caps   Take 100 mg by mouth 2 (two) times daily.    Commonly known as:  COLACE           ferrous sulfate 325 (65 FE) MG Tbec   Take 1 tablet (325 mg total) by mouth 3 (three) times - medication strength  - how much to take  - when to take this   Commonly known as:  COUMADIN           * Notice: This list has 2 medication(s) that are the same as other medications prescribed for you.  Read the directions carefully, and ask your doctor o

## (undated) NOTE — MR AVS SNAPSHOT
[facility]  [address] [place] [address] [phone]               Thank you for choosing us for your health care visit with Tanya Vance MD.  We are glad to serve you and happy to provide you with this Commonly known as:  LIPITOR           Calcium-Vitamin D 600-400 MG-UNIT Tabs   Take 1 tablet by mouth 2 (two) times daily. ferrous sulfate 325 (65 FE) MG Tbec   Take 1 tablet (325 mg total) by mouth 3 (three) times daily with meals.            Hammad Licea view more details from this visit by going to [email or web address]. If you've recently had a stay at the Hospital you can access your discharge instructions in [facility] by going to Visits < Admission Summaries.  If you've been to the Emergency Depar

## (undated) NOTE — ED AVS SNAPSHOT
Jammie Harada   MRN: [id number]    Department:  [facility] Emergency Department   Date of Visit:  12/19/2019           Disclosure     Insurance plans vary and the physician(s) referred by the ER may not be covered by your plan.  Please contact y tell this physician (or your personal doctor if your instructions are to return to your personal doctor) about any new or lasting problems. The primary care or specialist physician will see patients referred from the [facility] Emergency Department.  Maryanne Jett

## (undated) NOTE — LETTER
[facility]  Far[address] Marxjose francisco [address] [facility], [address] [place] [address]    Consent for Operation    Date: __________________    Time: _______________    1.  I authorize the performance upon Marciano Em the following operation:    Procedure(s):  removal of left ple videotape. [facility] will not be responsible for storage or maintenance of this tape. 6. For the purpose of advancing medical education, I consent to the admittance of observers to the Operating Room.     7. I authorize the use of any specimen, organs Signature of Patient:   ___________________________    When the patient is a minor or mentally incompetent to give consent:  Signature of person authorized to consent for patient: ___________________________   Relationship to patient: _____________________ drugs/illegal medications). Failure to inform my anesthesiologist about these medicines may increase my risk of anesthetic complications. · If I am allergic to anything or have had a reaction to anesthesia before.     3. I understand how the anesthesia med I have discussed the procedure and information above with the patient (or patient’s representative) and answered their questions. The patient or their representative has agreed to have anesthesia services.     _______________________________________________

## (undated) NOTE — MR AVS SNAPSHOT
[facility]  [address] Bing Robbins [address] [place] [id number] [phone]               Thank you for choosing us for your health care visit with Uriel Nagel MD.  We are glad to serve you and happy to provide you with this Assoc Dx:  Essential hypertension [I10]           Comp Metabolic Panel (14) [E]    Complete by:  Connor 15, 2017 (Approximate)    Assoc Dx:  Essential hypertension [I10]           Hemoglobin A1C [E]    Complete by:  Connor 15, 2017 (Approximate)    Assoc Dx:   I Covered at least every 3 years,           GLUCOSE (mg/dL)   Date Value   05/05/2017 112*   09/08/2014 115*   ---------- Medicare covers annually or at 6-month intervals for prediabetic patients        Cardiovascular Disease Screening     Cholesterol, cove Bone Density Screening      Bone density screening   Covered every 2 yrs after age 72    Covered yearly for Long term Glucocorticoid medication (Steroids) Requires diagnosis related to osteoporosis or estrogen deficiency.    Biennial benefit unless patient Persons who live in the same house as a HepB virus carrier   Homosexual men   Illicit injectable drug abusers     Tetanus Toxoid- Only covered with a cut with metal- TD and TDaP Not covered by Medicare Part B) No orders found for this or any previous visit Take 1 tablet by mouth 2 (two) times daily. docusate sodium 100 MG Caps   Take 100 mg by mouth 2 (two) times daily.    Commonly known as:  COLACE           ferrous sulfate 325 (65 FE) MG Tbec   Take 1 tablet (325 mg total) by mouth 3 (three) times Tips for making healthy food choices  -   Enjoy your food, but eat less. Fully enjoy your food when eating. Don’t eat while distracted and slow down. Avoid over sized portions. Don’t eat while when you’re bored.      EAT THESE FOODS MORE OFTEN: EAT T

## (undated) NOTE — LETTER
Hospital Discharge Documentation  Please phone to schedule a hospital follow up appointment. From: [address] Hospitalist's Office  Phone: [phone]    Patient discharged time/date: 1/8/2023  3:49 PM  Patient discharge disposition:  Home or Self Care       Discharge Summary - D/C Summary      Discharge Summary signed by Carline Bang MD at 1/9/2023  6:17 AM   Version 1 of 1    Author: Carline Bang MD Service: Hospitalist Author Type: Physician    Filed: 1/9/2023  6:17 AM Status: Signed    : Carline Bang MD (Physician)         [facility]    PATIENT'S NAME: Zoraida Cavralho   ATTENDING PHYSICIAN: Candelario Orozco MD   PATIENT ACCOUNT#:   [id number]    LOCATION:  [address] [place] [address] #:   [id number]       YOB: 1940  ADMISSION DATE:       01/06/2023      DISCHARGE DATE:  01/08/2023    DISCHARGE SUMMARY      More than 45 minutes were spent preparing this discharge. DISCHARGE DIAGNOSIS:  Acute on chronic diastolic congestive heart failure, presenting with dyspnea perhaps related to not taking her medications. HISTORY AND HOSPITAL COURSE:  This is a very pleasant 41-year-old white female who appears younger than her stated age who presents with a history of having progressive shortness of breath. She had not taken her medicines for several days because she ran out. She was unable to get them from her mail order. She had a wet cough and was really quite uncomfortable. On admission to the emergency room, she had an O2 saturation of 87% on room air. She was placed on oxygen and received diuresis and was much improved, was able to be discharged home after approval by Cardiology. PHYSICAL EXAMINATION ON DISCHARGE:    VITAL SIGNS:  Temperature 98, pulse 100, respiratory rate 18, 110/74, 92% on room air. HEENT:  Oropharynx is now dull and dry. HEART:  Normal S1, S2. No S3. Tachycardic.   ABDOMEN:  Soft and nontender. EXTREMITIES:  Mild edema. NEUROLOGIC:  She is alert and oriented, friendly and cooperative. LABORATORY STUDIES:  Please see chart. ASSESSMENT AND PLAN:    1. Acute on chronic diastolic congestive heart failure. P.o. torsemide as per Cardiology. 2.   Acute on chronic hypoxemic respiratory failure, O2 saturation 87% on room air. Weaned off oxygen. Doing well. 3.   History of atrial fibrillation. Continue medications. 4.   Dysuria. Patient's urinalysis does not seem to be revealing. 5.   Poor appetite, perhaps from gas and CHF. 6.   Hyperlipidemia. 7.   DVT prophylaxis, on Eliquis. 8.   Muscle spasm in the right side of her neck. CONDITION ON DISCHARGE:  Stable. CODE STATUS:  Full Code. DISCHARGE MEDICATIONS:  All new medications were written on paper prescriptions per patient's and daughter's request.    1.   Tylenol 325 mg every 4 hours as needed. Watch total daily Tylenol limit to 3 g.  2.   Eliquis 2.5 mg twice a day. Watch for bleeding. 3.   Ecotrin 81 mg daily with food. 4.   Atorvastatin 20 mg nightly. Watch for muscle weakness. 5.   Vitamin D3 at 2000 units daily. 6.   Cardizem  mg daily. 7.   Ferrous sulfate 325 mg twice a day. 8.   Synthroid 50 mcg daily. 9.   Magnesium oxide 400 mg twice a day. 10.   Metoprolol 50 mg by mouth every morning. 11.   Omeprazole 20 mg daily. 12.   Potassium chloride 20 mEq daily. 13.   Torsemide 20 mg twice a day. DISCHARGE INSTRUCTIONS:  Diet is low fat, low salt. Activity is no heavy exercise; otherwise, as tolerated. Followup is with Dr. Aileen Garcia in 1 week as per Cardiology. Dr. Yady Giron; please call in 3 days for followup advice. Followup Cardiology as they wish with labs as an outpatient. Warm pack to her neck while awake. RISK OF READMISSION:  High. TCM followup recommended. Dictated By Magdalena Patel.  MD Pam  d: 01/08/2023 18:05:16  t: 01/08/2023 18:54:52  [facility] [id number]  [facility]/    Electronically signed by Sylvie Palm MD on 1/9/2023  6:17 AM

## (undated) NOTE — IP AVS SNAPSHOT
Patient Demographics     Address Phone E-mail Address    [phone] (Home) *Preferred*  . . (Work)  [phone] [facility]) [email or web address]      Emergency Contact(s)     Name Relation Home Work Mobile    Shayne Contact information:    [address] [facility]   [address] [facility] [address] [phone]          Follow up with [facility] Cardiopulmonary Rehabilitation On 3/7/2017.     Specialty:  Cardiac Rehabilitation    Why:  Time 1pm-ground floor [facility] hospital    Contact i ferrous sulfate 325 (65 FE) MG [facility]   Last time this was given:  325 mg on 2/10/2017  1:16 PM   Next dose due:  2/10/17 in evening with dinner        Take 1 tablet (325 mg total) by mouth 3 (three) times daily with meals.     DIAMANTE Smith These medications were sent to [facility], [address] [facility], [address] [facility], [address], [address] [place] [address], [address] [place] [address]     Phone:  [phone]    - amiodarone HCl 200 MG Tabs  - Warfarin Sodium 1 M Order ID Medication Name Action Time Action Reason Comments    [id number] mupirocin (BACTROBAN) 2% nasal ointment OINT 1 Application 25/20/31 4796 Given      [id number] mupirocin (BACTROBAN) 2% nasal ointment OINT 1 Application 11/40/55 1183 Given Note: Calculation is only valid when Albumin is less than 4.0g/dL.      Sodium 142 136-144 mmol/L  [facility]   Potassium 3.7 3.6-5.1 mmol/L  [facility]   Chloride 107 101-111 mmol/L  [facility]   CO2 30.0 22.0-32.0 mmol/L  [facility]            CBC WITH Order Status:  Completed Lab Status:  Final result Updated:  02/07/17 0200    Specimen Information:  Blood from Blood,peripheral      Blood Culture Result No Growth 5 Days     Respiratory Panel FLU expanded Once [[id number]]  (Abnormal) Collected:  02/02/1 lasting for about 10 minutes, associated with shortness of breath on exertion, chest pain also worse with exertion, shortness of breath improved with rest.  Denies any associated abdominal pain. Denies any associated fever chills.   Denies any focal weakne • lung cancer [Other] [OTHER] Daughter 50      Reviewed    Social history:   reports that she quit smoking about 21 years ago. Her smoking use included Cigarettes. She has a 30 pack-year smoking history.  She has never used smokeless tobacco. She reports th Calcium Carb-Cholecalciferol (CALCIUM-VITAMIN D) 600-400 MG-UNIT Oral Tab Take 1 tablet by mouth 2 (two) times daily. Disp: 180 tablet Rfl: 3 Taking   Magnesium 100 MG Oral Tab Take 1 tablet by mouth daily.    Disp:  Rfl:  Taking       Review of Systems:  A ASSESSMENT / PLAN:     1. Chest pain and dyspnea on exertion , possible acute on chronic diastolic heart failure \"will rule out with 3 sets of cardiac enzymes and EKG. Monitor on telemetry. Cardiology on consult  2.  CAD with previous history of stent–co History of present illness:  Records reviewed, and patient examined. Consult Requested by: Dr Blair Coleman is a 68year old with nonobstructive CAD and multiple cardiac riskf actors, who had an episode of chest pain yesterday when walking.  It was Comment Procedure: ABDOMINAL AORTIC ANEURYSM ENDOSCOPIC;  Surgeon: Marie Euceda MD;  Location: [facility] CVOR    EGD N/A 8/19/2016    Comment Procedure: ESOPHAGOGASTRODUODENOSCOPY (EGD);   Surgeon: Jonathan Suresh MD;  Location: [facility] ENDOSCOPY    EGD N/A 8/26/ Followed by      [] dextrose 5 %-0.45 % NaCl infusion  Intravenous Continuous   0.9%  NaCl infusion  Intravenous Continuous   HYDROcodone-acetaminophen (NORCO)  MG per tab 1 tablet 1 tablet Oral Q4H PRN   Or      HYDROcodone-acetaminophen (NOR magnesium sulfate IVPB premix 2 g 2 g Intravenous PRN   mupirocin (BACTROBAN) 2% nasal ointment OINT 1 Application 1 Application Nasal BID   [COMPLETED] CeFAZolin Sodium (ANCEF/KEFZOL) IVPB premix 2 g 2 g Intravenous Q8H   Pantoprazole Sodium (PROTONIX) 40 [DISCONTINUED] alprazolam Nico Loyd) tab 0.5 mg 0.5 mg Oral Nightly PRN   [DISCONTINUED] nitroGLYCERIN infusion 50mg in D5W 250ml 5-400 mcg/min Intravenous Continuous   [DISCONTINUED] 0.9%  NaCl infusion  Intravenous Continuous   [COMPLETED] aspirin chewable [DISCONTINUED] Heparin Sodium (Porcine) 5000 UNIT/ML injection 4,000 Units 60 Units/kg Intravenous Once   [DISCONTINUED] heparin (PORCINE) drip 23316aicku/250mL infusion CONTINUOUS 200-3,000 Units/hr Intravenous Continuous          Smoking status: Former S Chest wall:  No tenderness or deformity. Cardiovascular:  Heart with regular rate rhythm, no murmurs appreciated. Radial and pedal pulses good. No cyanosis in all extremities. Abdomen:   No tenderness guarding or rigidity.  Liver and spleen are not enla working with PT/OT to upgrade present functional status, provide family training, assess home equipment and assistive device needs.             24 hr rehab nursing also beneficial for medication management, pressure sore prevention, bowel and bladder manage Card Echo 2d Doppler (cpt=93306)    2/2/2017                                                     *[facility] Mildly calcified annulus. There was mild regurgitation. 4. Left atrium: The left atrium was mildly dilated. 5. Right atrium: The atrium was mildly dilated. Impressions:   This study is compared with previous dated 05-05-15: Compared to the prior study, ther ---------------------------------------------------------------------------- Measurements  Left ventricle                                       Value        Reference  LV ID, ED, PLAX                                      3.9   cm     3.9 - 5.3  LV ID, ES, 11    mm Hg  ---------   Aorta                                                Value        Reference  Aortic root ID, ED, MM                               3.2   cm     ---------   Left atrium                                          Value 5     mm Hg  ---------   Right ventricle                                      Value        Reference  RV pressure, S, DP                                   32    mm Hg  --------- Legend: (L)  and  (H)  jose values outside specified reference r HYSTERECTOMY  1973    OTHER SURGICAL HISTORY  2007    Comment vaginal sling    ANGIOPLASTY (CORONARY)  2010    Comment stent    COLONOSCOPY      NEEDLE BIOPSY LEFT  4/15/14    Comment u/s guided (12:00, 4cmfn)= Fibroadenoma    CATH BARE METAL STENT (BMS) -   Climbing 3-5 steps with a railing?: A Little       AM-PAC Score:  Raw Score: 18   PT Approx Degree of Impairment Score: 46.58%   Standardized Score (AM-PAC Scale): 43.63   CMS Modifier (G-Code): CK    FUNCTIONAL ABILITY STATUS  Gait Assessment   Gait A training; Endurance  Rehab Potential : Good  Frequency (Obs): 5x/week    CURRENT GOALS   Goal #1   Patient is able to demonstrate supine - sit EOB @ level: minimum assistance       Goal #2   Patient is able to demonstrate transfers Sit to/from Stand at [facility] • A-fib [facility])    • Anemia    • GI bleed      first one 08/22/16     Past Surgical History      Past Surgical History    HYSTERECTOMY  1973    OTHER SURGICAL HISTORY  2007    Comment vaginal sling    ANGIOPLASTY (CORONARY)  2010    Comment stent    [address] -   Moving to and from a bed to a chair (including a wheelchair)?: A Little   -   Need to walk in hospital room?: A Little   -   Climbing 3-5 steps with a railing?: Total    AM-PAC Score:  Raw Score: 15   PT Approx Degree of Impairment Score: 57.7%   Stand rolling at assistance level: minimum assistance - MET 2/8/2017; progress to 250 feet with CGA      Goal #4  Patient to independently recall all 3 sternal precautions.     Goal #5      Goal #6      Goal Comments: Goals established on 2/7/2017, progressing 2 Eleuterio Orourke MD;  Location: [facility] CVOR    EGD N/A 8/19/2016    Comment Procedure: ESOPHAGOGASTRODUODENOSCOPY (EGD); Surgeon: Westley Pavon MD;  Location: [facility] ENDOSCOPY    EGD N/A 8/26/2016    Comment Procedure: ESOPHAGOGASTRODUODENOSCOPY (EGD);   Surgeon: Gurdeep Flores Pattern: Within Functional Limits  Stoop/Curb Assistance: Not tested       Skilled Therapy Provided: Patient was met in bed, agreeable to PT.  Patient educated on log roll transfer, rolled in bed with min A, required max A of 2 for sidelying to sitting up a Goal #2  Patient is able to demonstrate transfers Sit to/from Stand at assistance level: minimum assistance      Goal #3  Patient is able to ambulate 50 feet with assist device: walker - rolling at assistance level: minimum assistance - MET 2/8/2017; progr Acquired hypothyroidism    Hyperglycemia    Anemia    Dyspnea, unspecified type    Dyspnea    Coronary artery disease      Past Medical History  Past Medical History   Diagnosis Date   • Other and unspecified hyperlipidemia    • Unspecified essential hyp \"I can't take a deep breath. \"    Patient self-stated goal is go home.      OBJECTIVE  Precautions: Sternal;Chest tube;Hard of hearing  Fall Risk: High fall risk    WEIGHT BEARING RESTRICTION  Weight Bearing Restriction: None    PAIN ASSESSMENT  Ratin PT Approx Degree of Impairment Score: 72.57%   Standardized Score (AM-PAC Scale): 33.86   CMS Modifier (G-Code): CL    FUNCTIONAL ABILITY STATUS  Gait Assessment   Gait Assistance: Dependent assistance  Distance (ft): 1  Assistive Device: None  Pattern:  Sh The patient is below her baseline and would benefit from skilled inpatient PT to address the above deficits to assist patient in returning to prior level of function.   Patient at this time would benefit from D/C to acute rehabilitation, as she is expected Acquired hypothyroidism    Hyperglycemia    Anemia    Acute chest pain    Dyspnea, unspecified type    Dyspnea    Coronary artery disease    Constipation      Past Medical History  Past Medical History   Diagnosis Date   • Other and unspecified hyperlipi How much help from another person does the patient currently need…  -   Putting on and taking off regular lower body clothing?: A Lot  -   Bathing (including washing, rinsing, drying)?: A Lot  -   Toileting, which includes using toilet, bedpan or urinal? : prior level of function. OT Discharge Recommendations: Acute rehabilitation  OT Device Recommendations: None    PLAN  OT Treatment Plan: Balance activities; Energy conservation/work simplification techniques;ADL training;IADL training;Functional transfer Coronary artery disease    Constipation      Past Medical History  Past Medical History   Diagnosis Date   • Other and unspecified hyperlipidemia    • Unspecified essential hypertension    • [facility]) [facility]     asymtomatic   • Faith Terry -   Putting on and taking off regular lower body clothing?: A Lot  -   Bathing (including washing, rinsing, drying)?: A Lot  -   Toileting, which includes using toilet, bedpan or urinal? : A Lot  -   Putting on and taking off regular upper body clothing?: patient is below baseline and would benefit from skilled inpatient OT to address the above deficits, maximizing patient's ability to return to prior level of function.     OT Discharge Recommendations: Acute rehabilitation  OT Device Recommendations: None Filed:  2/8/2017  5:16 PM Note Time:  2/8/2017  5:12 PM Status:  Signed    :  Geovanny Melendez OT (Occupational Therapist)           OCCUPATIONAL THERAPY TREATMENT NOTE - INPATIENT     Room Number: 4859/7727-H  Session: 1   Number of Visits to Meet Patient self-stated goal is to go home    OBJECTIVE  Precautions: Sternal;Chest tube;Hard of hearing    WEIGHT BEARING RESTRICTION  Weight Bearing Restriction: None                PAIN ASSESSMENT  Ratin  Location: no pain  Management Techniques:  Jose Cummings Filed:  2/7/2017  4:48 PM Note Time:  2/7/2017  4:44 PM Status:  Signed    :  Lubna Nam OT (Occupational Therapist)           OCCUPATIONAL THERAPY EVALUATION - INPATIENT     Room Number: 7792/6020-V  Evaluation Date: 2/7/2017  Type of Evalua Type of Home: Independent living facility  Home Layout: One level  Lives With: Alone    Toilet and Equipment: Standard height toilet  Shower/Tub and Equipment: Walk-in shower  Other Equipment: None    Occupation/Status: retired  Hand Dominance: Right  Drkash -   Bathing (including washing, rinsing, drying)?: A Lot  -   Toileting, which includes using toilet, bedpan or urinal? : A Lot  -   Putting on and taking off regular upper body clothing?: A Little  -   Taking care of personal grooming such as brushing kavita Patient will perform all ADLs: with supervision    Functional Transfer Goals  Patient will perform all functional transfers:  with supervision    UE Exercise Program Goal  Patient will be independent with bilateral AROM HEP (home exercise program).     Dacia Garcia

## (undated) NOTE — LETTER
[facility] [address], [address]  Consent for Procedure/Sedation    Date:     Time:       1. I authorize the performance upon Evagnelista Sunshine the following:  TRANSESOPHAGEAL ECHOCARDIOGRAPHY FOLLOWED BY CRYOABLATION     2.  I aut ________________________________    ___________________    Witness: _________________________      Date: ___________________    Printed: 2019   10:44 AM  Patient Name: Catalina Spicer        : 1940       Medical Record #: [id number]

## (undated) NOTE — MR AVS SNAPSHOT
After Visit Summary   2/20/2017    Zaynab Fink    MRN: [id number]           Visit Information        Provider Department Dept Phone    2/20/2017  3:00 PM [facility]  Ultrasound [phone]      Your Vitals Were     BP Pulse Temp(Src) Res omeprazole 20 MG Oral Capsule Delayed Release Take 1 capsule (20 mg total) by mouth daily. LORazepam 1 MG Oral Tab Take 1 tablet (1 mg total) by mouth nightly as needed for Anxiety.     Multiple Vitamins-Minerals (TAB-A-MADELINE MAXIMUM) Oral Tab Take 1 tab will help us do so. For more information on [employer], please visit [email or web address]

## (undated) NOTE — MR AVS SNAPSHOT
[facility]  [address] Bing Robbins [address] [place] [address] [phone]               Thank you for choosing us for your health care visit with Iris Cunningham MD.  We are glad to serve you and happy to provide you with this amiodarone HCl 200 MG Tabs   Take 1 tablet (200 mg total) by mouth daily. Commonly known as:  PACERONE           aspirin 81 MG Tabs   Take 1 tablet (81 mg total) by mouth daily.            Atorvastatin Calcium 20 MG Tabs   Take 1 tablet (20 mg total) your doctor or other care provider to review them with you. Where to Get Your Medications      These medications were sent to [facility], [address] [place] [address], [address] [facility] [address] [address], [address] [facility] [address]  [facility], [address]

## (undated) NOTE — IP AVS SNAPSHOT
[facility] [place] [address] [place], [address] ~ [phone]                Discharge Summary   2/2/2017    Frances Jennings           Admission Information        Provider Department    2/2/2017 Uli De La Torre MD  8ne-A         T Next dose due:  2/11/17        Take 2 tablets (400 mg total) by mouth daily.  Until 2/14/17, then decrease to 200 mg daily    Gerardo Stuart taking these medications        Instructions Authorizing Provider    Morning Aft Last time this was given:  25 mg on 2/10/2017  5:08 AM   Commonly known as: Toprol XL   Next dose due:  2/11/17        Take 1 tablet (25 mg total) by mouth daily.     Kp Smith                           omeprazole 20 MG Cpdr   Commonly known as: dress comfortably and wear exercise shoes for the appointment. Your appointment will last up to 2 hours. If you have questions about cardiac rehabilitation please call [phone].     FOR ANGIE:  Please do chest xray, pa and lateral with decubitus [address] [id number] [phone]            Mar 01, 2017 11:00 AM   Post Op with CYNDI Holloway   [facility])    [address] [place] [address]. Sachin Osorio 85            Mar 07, 2017 0.07 (02/10/17)  0.01    (02/07/17)  94.5 (02/07/17)  2.0 (02/07/17)  2.8 (02/07/17)  0.0 (02/07/17)  0.1  (02/07/17)  15.79 (H) (02/07/17)  0.34 (L) (02/07/17)  0.47 (02/07/17)  0.00 (02/07/17)  0.02    (02/02/17)  63.9 (02/02/17)  21.4 (02/02/17)  9.2 (0 You can access your MyChart to more actively manage your health care and view more details from this visit by going to [email or web address].   If you've recently had a stay at the Hospital you can access your discharge instructions in [address] by jenn constipation           Blood Thinners (Anticoagulants)     Warfarin Sodium 1 MG Oral Tab       Use: Prevent the development or progression of blood clots   Most common side effects: Abnormal bleeding   What to report to your healthcare team: Cierra Crocker Alendronate Sodium 70 MG Oral Tab (Starting on 3/10/2017)    Levothyroxine Sodium 50 MCG Oral Tab         Use: Regulate metabolism and inflammation   Most common side effects:  Dizziness, swelling, appetite changes, weight changes, changes in sleep and al

## (undated) NOTE — IP AVS SNAPSHOT
[facility] [place] [address] [place], [address] ~ [phone]                Discharge Summary   2/24/2017    Zaynab Fink           Admission Information        Provider Department    2/24/2017 Deloise Spurling, MD  Pre-Op / Dulce Aguilar [place]     [    ]    [    ]    [    ]    [    ]       docusate sodium 100 MG Caps   Commonly known as:  COLACE        Take 100 mg by mouth 2 (two) times daily.       [    ]    [    ]    [    ]    [    ]       ferrous sulfate 325 (65 FE) MG Tbe Take 1 tablet by mouth daily. [    ]    [    ]    [    ]    [    ]       ValACYclovir HCl 1 G Tabs   Commonly known as:  VALTREX        Take by mouth 3 (three) times daily.       [    ]    [    ]    [    ]    [    ]       Charles Engle Pneumococcal (Prevnar 13) 9/15/2016      Recent Hematology Lab Results  (Last 3 results in the past 90 days)    WBC RBC Hemoglobin Hematocrit MCV MCH MCHC RDW Platelet MPV    (36/67/08)  10.9 (02/10/17)  2.88 (L) (02/10/17)  8.4 (L) (02/10/17)  27.0 (L) ( We are concerned for your overall well being:    - If you are a smoker or have smoked in the last 12 months, we encourage you to explore options for quitting.     - If you have concerns related to behavioral health issues or thoughts of harming yourself,

## (undated) NOTE — MR AVS SNAPSHOT
[facility]  [address] [place] [address] [place] [id number] [phone]               Thank you for choosing us for your health care visit with Nena Hull MD.  We are glad to serve you and happy to provide you with this Return in about 3 months (around 7/14/2017).          Reason for Today's Visit     Blood Pressure     Medication Request     Lab           Medical Issues Discussed Today     Acquired hypothyroidism    Chronic atrial fibrillation (HCC)    Dyslipidemia    Es Commonly known as:  NORCO           Levothyroxine Sodium 75 MCG Tabs   Take 1 tablet (75 mcg total) by mouth before breakfast.   Commonly known as:  SYNTHROID, LEVOTHROID           LORazepam 1 MG Tabs   Take 1 tablet (1 mg total) by mouth nightly as needed discharge instructions in [facility] by going to Visits < Admission Summaries. If you've been to the Emergency Department or your doctor's office, you can view your past visit information in [facility] by going to Visits < Visit Summaries. [employer] questions?

## (undated) NOTE — LETTER
[facility] [address], [address] [place] [address]    Consent for Anesthesia   1.    Phyllis Lau agree to be cared for by an anesthesiologist, who is specially trained to monitor me and give me medicine to put me to sleep or keep me comfort vision, nerves, or muscles and in extremely rare instances death. 5. My doctor has explained to me other choices available to me for my care (alternatives).   6. Pregnant Patients (“epidural”):  I understand that the risks of having an epidural (medicine g

## (undated) NOTE — IP AVS SNAPSHOT
[facility] [place] [address] [facility], [address] [place] [address] Rd ~ [phone]                Discharge Summary   2/20/2017    [address] [place] [address]           Admission Information        Provider Department    2/20/2017 Guanakito Buck MD  Ultrasound [    ]    [    ]    [    ]       HYDROcodone-acetaminophen 5-325 MG Tabs   Commonly known as:  NORCO        Take 1-2 tablets by mouth every 4 to 6 hours as needed for Pain.     Rommel Babin     [    ]    [    ]    [    ]    [    ]       Levothyroxi Have someone drive you home after your procedure. You may not drive yourself home    [address]    · Take things easy for the rest of the day after your biopsy.   You may be sore in the biopsy area for one to five days  · DO drink plenty of flui 3.08 (L) (02/09/17)  9.1 (L) (02/09/17)  28.8 (L) (02/09/17)  93.5 (02/09/17)  29.5 (02/09/17)  31.6  (02/09/17)  141.0 (L)     (02/07/17)  16.7 (H) (02/07/17)  3.32 (L) (02/07/17)  9.9 (L) (02/07/17)  29.8 (L) (02/07/17)  89.8 (02/07/17)  29.8 (02/07/17) [phone]. - If you don’t have insurance, [employer] has partnered with [employer] [address] to help you get signed up for insurance coverage.   [employer] is a [employer] program that can help with your Affordab

## (undated) NOTE — IP AVS SNAPSHOT
[facility] [place]  [address] [place], [address] ~ [phone]                Discharge Summary   5/5/2017    Marquis Kingsley           Admission Information        Provider Department    5/5/2017 Kayy Mello MD  Dawn Fuentes Flory Philippe     [    ]    [    ]    [    ]    [    ]       docusate sodium 100 MG Caps   Commonly known as:  COLACE        Take 100 mg by mouth 2 (two) times daily.       [    ]    [    ]    [    ]    [    ]       ferrous sulfate 325 (65 FE) MG Tbe [    ]    [    ]    [    ]       Senna 8.6 MG Tabs   Commonly known as:  SENOKOT        Take 2 tablets (17.2 mg total) by mouth daily.     [facility] El[facility]     [    ]    [    ]    [    ]    [    ]       TAB-A-MADELINE MAXIMUM Tabs        Take 1 tablet by mo 3.85 (04/14/17)  10.8 (L) (04/14/17)  35.5 (04/14/17)  92.2 -- -- -- (04/14/17)  310.0 --    (02/10/17)  10.9 (02/10/17)  2.88 (L) (02/10/17)  8.4 (L) (02/10/17)  27.0 (L) (02/10/17)  93.8    (02/10/17)  144.0 (L)     (02/09/17)  14.8 (H) (02/09/17)  3.08 harming yourself, contact [facility] and Referral Center at [phone]. - If you don’t have insurance, [employer] has partnered with [employer] [address] to help you get signed up for insurance coverage.   [employer] Most common side effects:  Allergic reactions, rash, nausea, diarrhea   What to report to your healthcare team: Tolerance of medications, temperature, rash, itching, shortness of breath, chills, nausea, and diarrhea           Blood Pressure and Cardiac Medi ferrous sulfate 325 (65 FE) MG Oral Tab EC       Use: Increase blood cell counts   Most common side effects: Pain, fever, rash, fatigue, joint pain, blood clots, high blood pressure   What to report to your healthcare team: Pain, swelling, rash, fever Use: Treat agitation and difficulty sleeping   Most common side effects: Drowsiness, slows breathing   What to report to your healthcare team: Problems staying awake, confusion, memory problems             All Other Medications     Multiple Vitamins-Fajardo

## (undated) NOTE — LETTER
[facility] [address], [address]  Consent for Procedure/Sedation    Date: 10/9/2018   Time: 2130      1. I authorize the performance upon Joellen Jacobs the following:  Transesophageal Echocardiogram     2.  I authorize Dr. Eileen Barthel ________________________________    ___________________    Witness: _________________________      Date: ___________________    Printed: 10/9/2018   9:20 PM  Patient Name: Catalina Nayan        : 1940       Medical Record #: [id number]

## (undated) NOTE — LETTER
[facility]  [address] [facility], [address] [place] [address]    Consent for Operation    Date: __________________    Time: _______________    1.  I authorize the performance upon Renetta Cee the following operation:    Procedure(s):  INSERTION LEFT PLEU procedure has been videotaped, the surgeon will obtain the original videotape. The hospital will not be responsible for storage or maintenance of this tape.     6. For the purpose of advancing medical education, I consent to the admittance of observers to t STATEMENTS REQUIRING INSERTION OR COMPLETION WERE FILLED IN.     Signature of Patient:   ___________________________    When the patient is a minor or mentally incompetent to give consent:  Signature of person authorized to consent for patient: ____________ supplements, and pills I can buy without a prescription (including street drugs/illegal medications). Failure to inform my anesthesiologist about these medicines may increase my risk of anesthetic complications.   · If I am allergic to anything or have had Anesthesiologist Signature     Date   Time  I have discussed the procedure and information above with the patient (or patient’s representative) and answered their questions. The patient or their representative has agreed to have anesthesia services.     ___